# Patient Record
Sex: FEMALE | Race: BLACK OR AFRICAN AMERICAN | Employment: OTHER | URBAN - METROPOLITAN AREA
[De-identification: names, ages, dates, MRNs, and addresses within clinical notes are randomized per-mention and may not be internally consistent; named-entity substitution may affect disease eponyms.]

---

## 2017-03-15 DIAGNOSIS — E78.00 PURE HYPERCHOLESTEROLEMIA: ICD-10-CM

## 2017-03-15 RX ORDER — PRAVASTATIN SODIUM 10 MG/1
10 TABLET ORAL
Qty: 90 TAB | Refills: 1 | Status: SHIPPED | OUTPATIENT
Start: 2017-03-15 | End: 2018-01-03 | Stop reason: SDUPTHER

## 2017-03-15 NOTE — TELEPHONE ENCOUNTER
Called and left message for Yamel Long (daughter) to give us a call and schedule her mother for a AWV and for HTN since we have not seen her this year.

## 2017-04-28 ENCOUNTER — OFFICE VISIT (OUTPATIENT)
Dept: FAMILY MEDICINE CLINIC | Age: 82
End: 2017-04-28

## 2017-04-28 VITALS
OXYGEN SATURATION: 98 % | WEIGHT: 132 LBS | RESPIRATION RATE: 16 BRPM | SYSTOLIC BLOOD PRESSURE: 148 MMHG | BODY MASS INDEX: 25.91 KG/M2 | DIASTOLIC BLOOD PRESSURE: 84 MMHG | HEART RATE: 81 BPM | HEIGHT: 60 IN | TEMPERATURE: 99 F

## 2017-04-28 DIAGNOSIS — Z00.00 ROUTINE GENERAL MEDICAL EXAMINATION AT A HEALTH CARE FACILITY: Primary | ICD-10-CM

## 2017-04-28 DIAGNOSIS — Z71.89 ADVANCE DIRECTIVE DISCUSSED WITH PATIENT: ICD-10-CM

## 2017-04-28 DIAGNOSIS — I10 ESSENTIAL HYPERTENSION, BENIGN: ICD-10-CM

## 2017-04-28 DIAGNOSIS — M81.0 AGE RELATED OSTEOPOROSIS, UNSPECIFIED PATHOLOGICAL FRACTURE PRESENCE: ICD-10-CM

## 2017-04-28 DIAGNOSIS — K21.9 GASTROESOPHAGEAL REFLUX DISEASE WITHOUT ESOPHAGITIS: ICD-10-CM

## 2017-04-28 RX ORDER — PANTOPRAZOLE SODIUM 40 MG/1
40 TABLET, DELAYED RELEASE ORAL DAILY
Qty: 90 TAB | Refills: 1 | Status: SHIPPED | OUTPATIENT
Start: 2017-04-28 | End: 2017-11-17 | Stop reason: SDUPTHER

## 2017-04-28 NOTE — PROGRESS NOTES
Patient is currently not taking the following medications and wants them removed from their list:    no    Learning Assessment (baseline): complete  Depression Screening: complete  Fall Risk:  complete    1. Have you been to the ER, urgent care clinic since your last visit? Hospitalized since your last visit? No    2. Have you seen or consulted any other health care providers outside of the [address] [place] [address] since your last visit? Include any pap smears or colon screening.  No      Patient is due for the following immunizations:    Influenza: declined  Pneumococcal:declined

## 2017-04-28 NOTE — PROGRESS NOTES
Subjective:   Jaida Garcia is a 80 y.o. female with hypertension, GERD, Osteoporosis    Doing great with current Tx and needs refill protonix 40mg daily which works great. Tolerating fosamax but will forget dose here and there. Hypertension ROS: taking medications as instructed, no medication side effects noted, no TIA's, no chest pain on exertion, no dyspnea on exertion, no swelling of ankles. Other symptoms and concerns: none. Past medications tried and failed none. Current Outpatient Prescriptions   Medication Sig Dispense Refill    pravastatin (PRAVACHOL) 10 mg tablet Take 1 Tab by mouth nightly. 90 Tab 1    losartan (COZAAR) 50 mg tablet 50 mg two (2) times a day.  donepezil (ARICEPT) 5 mg tablet Take 1 Tab by mouth nightly. 90 Tab 1    metoprolol tartrate (LOPRESSOR) 50 mg tablet Take 50 mg by mouth daily.  alendronate (FOSAMAX) 35 mg tablet Take 1 Tab by mouth every seven (7) days. Take on empty stomach with 12 oz water. 12 Tab 1    pantoprazole (PROTONIX) 40 mg tablet Take 1 tablet twice daily for a month, then 1 tablet once daily. 90 Tab 1    ferrous sulfate 325 mg (65 mg iron) tablet Take 1 Tab by mouth three (3) times daily (with meals). 90 Tab 1    senna-docusate (PERICOLACE) 8.6-50 mg per tablet Take 1 tablet by mouth daily. 30 tablet 0    amLODIPine (NORVASC) 5 mg tablet Take 1 Tab by mouth daily. 90 Tab 1    cyanocobalamin 1,000 mcg tablet Take 1 Tab by mouth daily. 30 Tab 1    polyethylene glycol (MIRALAX) 17 gram/dose powder Take 17 g by mouth daily.  527 g 1      Patient Active Problem List   Diagnosis Code    HTN (hypertension) I10    GERD (gastroesophageal reflux disease) K21.9    Anxiety F41.9    Pulmonary embolism (HCC) I26.99    DVT (deep venous thrombosis) (HCC) I82.409    Anemia D64.9    HLD (hyperlipidemia) E78.5    Atrial fibrillation (HCC) I48.91    PSVT (paroxysmal supraventricular tachycardia) (HCC) I47.1    Syncope R55    Cataract H26.9    Right knee pain M25.561    CAD (coronary artery disease) I25.10    Hypertrophic cardiomyopathy (HCC) I42.2    Cardiomegaly I51.7    Palpitations R00.2    Atrial flutter (HCC) I48.92    Coronary atherosclerosis of native coronary artery I25.10    Essential hypertension, benign I10    Unspecified combined systolic and diastolic heart failure N99.01    Pre-operative cardiovascular examination Z01.810    Nonspecific abnormal electrocardiogram (ECG) (EKG) R94.31    Intermediate coronary syndrome (HCC) I20.0    Depression F32.9    GI bleed K92.2    Diverticulitis K57.92    S/P ablation operation for arrhythmia Z98.890, Z86.79    CVA (cerebral infarction) I63.9    Acute right-sided weakness M62.89    Thrombocytopenia (HCC) D69.6    Osteoporosis M81.0    Acute GI bleeding K92.2    Acute blood loss anemia D62    Diverticulosis K57.90    History of blood transfusion Z92.89    Lower GI bleed K92.2     Family History   Problem Relation Age of Onset    Coronary Artery Disease Neg Hx      Lab Results   Component Value Date/Time    Cholesterol, total 155 04/17/2015 01:13 AM    HDL Cholesterol 56 04/17/2015 01:13 AM    LDL, calculated 76.4 04/17/2015 01:13 AM    VLDL, calculated 22.6 04/17/2015 01:13 AM    Triglyceride 113 04/17/2015 01:13 AM    CHOL/HDL Ratio 2.8 04/17/2015 01:13 AM     Lab Results   Component Value Date/Time    Sodium 142 11/06/2016 07:00 AM    Potassium 3.2 11/06/2016 07:00 AM    Chloride 106 11/06/2016 07:00 AM    CO2 29 11/06/2016 07:00 AM    Anion gap 7 11/06/2016 07:00 AM    Glucose 77 11/06/2016 07:00 AM    BUN 12 11/06/2016 07:00 AM    Creatinine 1.00 11/06/2016 07:00 AM    BUN/Creatinine ratio 12 11/06/2016 07:00 AM    GFR est AA >60 11/06/2016 07:00 AM    GFR est non-AA 52 11/06/2016 07:00 AM    Calcium 8.7 11/06/2016 07:00 AM    Bilirubin, total 0.3 11/02/2016 09:49 AM    AST (SGOT) 19 11/02/2016 09:49 AM    Alk.  phosphatase 44 11/02/2016 09:49 AM    Protein, total 7.5 11/02/2016 09:49 AM    Albumin 3.5 11/02/2016 09:49 AM    Globulin 4.0 11/02/2016 09:49 AM    A-G Ratio 0.9 11/02/2016 09:49 AM    ALT (SGPT) 15 11/02/2016 09:49 AM     Lab Results   Component Value Date/Time    WBC 5.1 11/06/2016 07:00 AM    Hemoglobin (POC) 10.5 11/19/2014 11:55 AM    Hemoglobin (POC) 11.2 08/07/2012 03:53 PM    HGB 10.1 11/06/2016 07:00 AM    Hematocrit (POC) 33 08/07/2012 03:53 PM    HCT 30.9 11/06/2016 07:00 AM    PLATELET 64 99/86/1173 07:00 AM    MCV 94.5 11/06/2016 07:00 AM     Wt Readings from Last 3 Encounters:   04/28/17 132 lb (59.9 kg)   11/21/16 130 lb (59 kg)   11/04/16 129 lb 9.6 oz (58.8 kg)       Objective:   Visit Vitals    /84    Pulse 81    Temp 99 °F (37.2 °C) (Oral)    Resp 16    Ht 5' (1.524 m)    Wt 132 lb (59.9 kg)    SpO2 98%    BMI 25.78 kg/m2     GEN:  Appears stated age in NAD. HEENT: Conjunctiva/lids normal.  External ears and nose without lesions/trauma. Hearing Intact. Tongue midline. NECK: Trachea midline. Supple. Full ROM  CARDIAC:  regular rate and rhythm. 3+ Murmur, no peripheral edema. LUNGS: breath sounds equal and symmetric, no accessory muscle use. MS: no clubbing/cyanosis. SKIN: Warm/dry without rash. PSYCH: Appropriate insight, Judgment. A&O x 3. Assessment:    HTN  Osteoporosis  GERD    Plan:   Discussed okay to take fosamax the next day if dose missed w/ H2O on empty stomach. Refill protonix and RCT 6 months. Patient verbalized understanding of plan. See other note MWV.

## 2017-04-28 NOTE — PATIENT INSTRUCTIONS
Advance Directives: Care Instructions  Your Care Instructions  An advance directive is a legal way to state your wishes at the end of your life. It tells your family and your doctor what to do if you can no longer say what you want. There are two main types of advance directives. You can change them any time that your wishes change. · A living will tells your family and your doctor your wishes about life support and other treatment. · A durable power of  for health care lets you name a person to make treatment decisions for you when you can't speak for yourself. This person is called a health care agent. If you do not have an advance directive, decisions about your medical care may be made by a doctor or a  who doesn't know you. It may help to think of an advance directive as a gift to the people who care for you. If you have one, they won't have to make tough decisions by themselves. Follow-up care is a key part of your treatment and safety. Be sure to make and go to all appointments, and call your doctor if you are having problems. It's also a good idea to know your test results and keep a list of the medicines you take. How can you care for yourself at home? · Discuss your wishes with your loved ones and your doctor. This way, there are no surprises. · Many states have a unique form. Or you might use a universal form that has been approved by many states. This kind of form can sometimes be completed and stored online. Your electronic copy will then be available wherever you have a connection to the Internet. In most cases, doctors will respect your wishes even if you have a form from a different state. · You don't need a  to do an advance directive. But you may want to get legal advice. · Think about these questions when you prepare an advance directive:  ¨ Who do you want to make decisions about your medical care if you are not able to?  Many people choose a family member or close friend. ¨ Do you know enough about life support methods that might be used? If not, talk to your doctor so you understand. ¨ What are you most afraid of that might happen? You might be afraid of having pain, losing your independence, or being kept alive by machines. ¨ Where would you prefer to die? Choices include your home, a hospital, or a nursing home. ¨ Would you like to have information about hospice care to support you and your family? ¨ Do you want to donate organs when you die? ¨ Do you want certain Alevism practices performed before you die? If so, put your wishes in the advance directive. · Read your advance directive every year, and make changes as needed. When should you call for help? Be sure to contact your doctor if you have any questions. Where can you learn more? Go to http://cachorroMesosphereitzel.info/. Enter R264 in the search box to learn more about \"Advance Directives: Care Instructions. \"  Current as of: November 17, 2016  Content Version: 11.2  © 7057-2116 [employer]. Care instructions adapted under license by [employer] (which disclaims liability or warranty for this information). If you have questions about a medical condition or this instruction, always ask your healthcare professional. [place] [address] any warranty or liability for your use of this information. Well Visit, Over 72: Care Instructions  Your Care Instructions  Physical exams can help you stay healthy. Your doctor has checked your overall health and may have suggested ways to take good care of yourself. He or she also may have recommended tests. At home, you can help prevent illness with healthy eating, regular exercise, and other steps. Follow-up care is a key part of your treatment and safety. Be sure to make and go to all appointments, and call your doctor if you are having problems.  It's also a good idea to know your test results and keep a list of the medicines you take. How can you care for yourself at home? · Reach and stay at a healthy weight. This will lower your risk for many problems, such as obesity, diabetes, heart disease, and high blood pressure. · Get at least 30 minutes of exercise on most days of the week. Walking is a good choice. You also may want to do other activities, such as running, swimming, cycling, or playing tennis or team sports. · Do not smoke. Smoking can make health problems worse. If you need help quitting, talk to your doctor about stop-smoking programs and medicines. These can increase your chances of quitting for good. · Protect your skin from too much sun. When you're outdoors from 10 a.m. to 4 p.m., stay in the shade or cover up with clothing and a hat with a wide brim. Wear sunglasses that block UV rays. Even when it's cloudy, put broad-spectrum sunscreen (SPF 30 or higher) on any exposed skin. · See a dentist one or two times a year for checkups and to have your teeth cleaned. · Wear a seat belt in the car. · Limit alcohol to 2 drinks a day for men and 1 drink a day for women. Too much alcohol can cause health problems. Follow your doctor's advice about when to have certain tests. These tests can spot problems early. For men and women  · Cholesterol. Your doctor will tell you how often to have this done based on your overall health and other things that can increase your risk for heart attack and stroke. · Blood pressure. Have your blood pressure checked during a routine doctor visit. Your doctor will tell you how often to check your blood pressure based on your age, your blood pressure results, and other factors. · Diabetes. Ask your doctor whether you should have tests for diabetes. · Vision. Experts recommend that you have yearly exams for glaucoma and other age-related eye problems. · Hearing. Tell your doctor if you notice any change in your hearing.  You can have tests to find out how well you hear.  · Colon cancer tests. Keep having colon cancer tests as your doctor recommends. You can have one of several types of tests. · Heart attack and stroke risk. At least every 4 to 6 years, you should have your risk for heart attack and stroke assessed. Your doctor uses factors such as your age, blood pressure, cholesterol, and whether you smoke or have diabetes to show what your risk for a heart attack or stroke is over the next 10 years. · Osteoporosis. Talk to your doctor about whether you should have a bone density test to find out whether you have thinning bones. Also ask your doctor about whether you should take calcium and vitamin D supplements. For women  · Pap test and pelvic exam. You may no longer need a Pap test. Talk with your doctor about whether to stop or continue to have Pap tests. · Breast exam and mammogram. Ask how often you should have a mammogram, which is an X-ray of your breasts. A mammogram can spot breast cancer before it can be felt and when it is easiest to treat. · Thyroid disease. Talk to your doctor about whether to have your thyroid checked as part of a regular physical exam. Women have an increased chance of a thyroid problem. For men  · Prostate exam. Talk to your doctor about whether you should have a blood test (called a PSA test) for prostate cancer. Experts disagree on whether men should have this test. Some experts recommend that you discuss the benefits and risks of the test with your doctor. · Abdominal aortic aneurysm. Ask your doctor whether you should have a test to check for an aneurysm. You may need a test if you ever smoked or if your parent, brother, sister, or child has had an aneurysm. When should you call for help? Watch closely for changes in your health, and be sure to contact your doctor if you have any problems or symptoms that concern you. Where can you learn more? Go to http://cachorro-itzel.info/.   Enter L278 in the search box to learn more about \"Well Visit, Over 65: Care Instructions. \"  Current as of: July 19, 2016  Content Version: 11.2  © 3532-0162 [employer]. Care instructions adapted under license by [employer] (which disclaims liability or warranty for this information). If you have questions about a medical condition or this instruction, always ask your healthcare professional. [place] [address] any warranty or liability for your use of this information.

## 2017-04-28 NOTE — PROGRESS NOTES
This is an Initial Medicare Annual Wellness Exam (AWV) (Performed 12 months after IPPE or effective date of Medicare Part B enrollment, Once in a lifetime)    I have reviewed the patient's medical history in detail and updated the computerized patient record. History     Past Medical History:   Diagnosis Date    Anemia 1/11/2010    thrombocytopenia    Anxiety 1/11/2010    Atrial fibrillation ([address] 1/11/2010    Atrial flutter (HCC)     recurrent    CAD (coronary artery disease) 8/3/2010    Cataract 1/11/2010    Coronary atherosclerosis of native coronary artery     CVA (cerebral infarction)     Depression 3/18/2014    Diverticulitis 11/19/2014    DVT (deep venous thrombosis) ([address] 1/1/2007    Dysarthria     Essential hypertension, benign     GERD (gastroesophageal reflux disease) 1/11/2010    History of blood transfusion 2/16/2016    HLD (hyperlipidemia) 1/11/2010    HTN (hypertension) 1/11/2010    Intermediate coronary syndrome (HCC)     Nonspecific abnormal electrocardiogram (ECG) (EKG)     Osteoporosis 1/4/2016    Pre-operative cardiovascular examination     PSVT (paroxysmal supraventricular tachycardia) ([address] 1/11/2010    Pulmonary embolism ([address] 1/1/2007    Right knee pain 1/11/2010    Stroke ([address]     Syncope 5/13/2009    Thrombocytosis ([address]     Unspecified combined systolic and diastolic heart failure       Past Surgical History:   Procedure Laterality Date    CARDIAC SURG PROCEDURE UNLIST      ablation    HX CATARACT REMOVAL      bilateral    HX CHOLECYSTECTOMY      HX OTHER SURGICAL      IVC filter    HX PACEMAKER      IR IVC FILTER PLACEMENT PERCUTANEOUS  2007     Current Outpatient Prescriptions   Medication Sig Dispense Refill    pravastatin (PRAVACHOL) 10 mg tablet Take 1 Tab by mouth nightly. 90 Tab 1    losartan (COZAAR) 50 mg tablet 50 mg two (2) times a day.  donepezil (ARICEPT) 5 mg tablet Take 1 Tab by mouth nightly.  90 Tab 1    metoprolol tartrate (LOPRESSOR) 50 mg tablet Take 50 mg by mouth daily.  alendronate (FOSAMAX) 35 mg tablet Take 1 Tab by mouth every seven (7) days. Take on empty stomach with 12 oz water. 12 Tab 1    pantoprazole (PROTONIX) 40 mg tablet Take 1 tablet twice daily for a month, then 1 tablet once daily. 90 Tab 1    ferrous sulfate 325 mg (65 mg iron) tablet Take 1 Tab by mouth three (3) times daily (with meals). 90 Tab 1    senna-docusate (PERICOLACE) 8.6-50 mg per tablet Take 1 tablet by mouth daily. 30 tablet 0    amLODIPine (NORVASC) 5 mg tablet Take 1 Tab by mouth daily. 90 Tab 1    cyanocobalamin 1,000 mcg tablet Take 1 Tab by mouth daily. 30 Tab 1    polyethylene glycol (MIRALAX) 17 gram/dose powder Take 17 g by mouth daily.  527 g 1     Allergies   Allergen Reactions    Pcn [Penicillins] Hives     Family History   Problem Relation Age of Onset    Coronary Artery Disease Neg Hx      Social History   Substance Use Topics    Smoking status: Never Smoker    Smokeless tobacco: Never Used    Alcohol use No     Patient Active Problem List   Diagnosis Code    HTN (hypertension) I10    GERD (gastroesophageal reflux disease) K21.9    Anxiety F41.9    Pulmonary embolism (HCC) I26.99    DVT (deep venous thrombosis) ([facility]) I82.409    Anemia D64.9    HLD (hyperlipidemia) E78.5    Atrial fibrillation (HCC) I48.91    PSVT (paroxysmal supraventricular tachycardia) ([facility]) I47.1    Syncope R55    Cataract H26.9    Right knee pain M25.561    CAD (coronary artery disease) I25.10    Hypertrophic cardiomyopathy (HCC) I42.2    Cardiomegaly I51.7    Palpitations R00.2    Atrial flutter ([facility]) I48.92    Coronary atherosclerosis of native coronary artery I25.10    Essential hypertension, benign I10    Unspecified combined systolic and diastolic heart failure X04.73    Pre-operative cardiovascular examination Z01.810    Nonspecific abnormal electrocardiogram (ECG) (EKG) R94.31    Intermediate coronary syndrome (HCC) I20.0  Depression F32.9    GI bleed K92.2    Diverticulitis K57.92    S/P ablation operation for arrhythmia Z98.890, Z86.79    CVA (cerebral infarction) I63.9    Acute right-sided weakness M62.89    Thrombocytopenia (HCC) D69.6    Osteoporosis M81.0    Acute GI bleeding K92.2    Acute blood loss anemia D62    Diverticulosis K57.90    History of blood transfusion Z92.89    Lower GI bleed K92.2         Depression Risk Factor Screening:     PHQ 2 / 9, over the last two weeks 3/16/2015   Little interest or pleasure in doing things Nearly every day   Feeling down, depressed or hopeless More than half the days   Total Score PHQ 2 5   Trouble falling or staying asleep, or sleeping too much Several days   Feeling tired or having little energy More than half the days   Poor appetite or overeating More than half the days   Feeling bad about yourself - or that you are a failure or have let yourself or your family down Nearly every day   Trouble concentrating on things such as school, work, reading or watching TV Several days   Moving or speaking so slowly that other people could have noticed; or the opposite being so fidgety that others notice More than half the days   Thoughts of being better off dead, or hurting yourself in some way Not at all   PHQ 9 Score 16   How difficult have these problems made it for you to do your work, take care of your home and get along with others -     Alcohol Risk Factor Screening: On any occasion during the past 3 months, have you had more than 3 drinks containing alcohol? No    Do you average more than 7 drinks per week? No    Functional Ability and Level of Safety:     Hearing Loss   none    Activities of Daily Living   Partial assistance. Requires assistance with: cooking    Fall Risk     Fall Risk Assessment, last 12 mths 4/28/2017   Able to walk? Yes   Fall in past 12 months?  No   Fall with injury? -   Number of falls in past 12 months -   Fall Risk Score -     Abuse Screen Patient is not abused    Review of Systems   A comprehensive review of systems was negative except for that written in the HPI. Physical Examination   Evaluation of Cognitive Function:  Mood/affect:  happy  Appearance: age appropriate  Family member/caregiver input: daughter - doing great per her    Visit Vitals    /84    Pulse 81    Temp 99 °F (37.2 °C) (Oral)    Resp 16    Ht 5' (1.524 m)    Wt 132 lb (59.9 kg)    SpO2 98%    BMI 25.78 kg/m2         Patient Care Team:  Sigifredo Hammer DO as PCP - General (Family Practice)  Wolfgang Liang RN as [address] [place] [address]  Mounika Sykes MD as Physician (Internal Medicine)  Analy Shepherd MD (Cardiology)  Lottie Libman, MD (Neurology)    Advice/Referrals/Counseling   Education and counseling provided:  Are appropriate based on today's review and evaluation  End-of-Life planning (with patient's consent)      Assessment/Plan       ICD-10-CM ICD-9-CM    1. Routine general medical examination at a health care facility Z00.00 V70.0    2. Gastroesophageal reflux disease without esophagitis K21.9 530.81 pantoprazole (PROTONIX) 40 mg tablet   3. Advance directive discussed with patient Z71.89 V65.49 ADVANCE CARE PLANNING FIRST 27 MINS     See other note E/M.

## 2017-04-28 NOTE — ACP (ADVANCE CARE PLANNING)
Advance Care Planning (ACP) Provider Note - Comprehensive     Date of ACP Conversation: 04/28/17  Persons included in Conversation:  patient and daughter  Length of ACP Conversation in minutes:  19 minutes    Authorized Decision Maker (if patient is incapable of making informed decisions): This person is:  Healthcare Agent/Medical Power of  under Advance Directive Daughter          General ACP for ALL Patients with Decision Making Capacity:   Importance of advance care planning, including choosing a healthcare agent to communicate patient's healthcare decisions if patient lost the ability to make decisions, such as after a sudden illness or accident  Understanding of the healthcare agent role was assessed and information provided    Review of Existing Advance Directive:  Reviewed in detail - no changes    For Serious or Chronic Illness:  Understanding of medical condition    Understanding of CPR, goals and expected outcomes, benefits and burdens discussed.     Interventions Provided:  Reviewed existing Advance Directive

## 2017-04-28 NOTE — MR AVS SNAPSHOT
Visit Information Date & Time Provider Department Dept. Phone Encounter #  
 4/28/2017  2:20 PM Bety Martins [phone] Follow-up Instructions Return in about 6 months (around 10/28/2017) for blood pressure. Follow-up and Disposition History Upcoming Health Maintenance Date Due DTaP/Tdap/Td series (1 - Tdap) 11/27/1948 ZOSTER VACCINE AGE 60> 11/27/1987 MEDICARE YEARLY EXAM 7/6/2016 GLAUCOMA SCREENING Q2Y 6/9/2017 Pneumococcal 65+ Low/Medium Risk (1 of 2 - PCV13) 11/28/2017* *Topic was postponed. The date shown is not the original due date. Allergies as of 4/28/2017  Review Complete On: 4/28/2017 By: Kate Sue DO Severity Noted Reaction Type Reactions Pcn [Penicillins]  01/11/2010   Topical Hives Current Immunizations  Reviewed on 2/28/2016 Name Date Influenza Vaccine (Quad) PF 11/3/2016  4:29 PM, 2/26/2016  1:59 AM  
  
 Not reviewed this visit You Were Diagnosed With   
  
 Codes Comments Routine general medical examination at a health care facility    -  Primary ICD-10-CM: Z00.00 ICD-9-CM: V70.0 Gastroesophageal reflux disease without esophagitis     ICD-10-CM: K21.9 ICD-9-CM: 530.81 Advance directive discussed with patient     ICD-10-CM: Z71.89 ICD-9-CM: V65.49 Age related osteoporosis, unspecified pathological fracture presence     ICD-10-CM: M81.0 ICD-9-CM: 733.01 Essential hypertension, benign     ICD-10-CM: I10 
ICD-9-CM: 401.1 Vitals BP Pulse Temp Resp Height(growth percentile) Weight(growth percentile) 148/84 81 99 °F (37.2 °C) (Oral) 16 5' (1.524 m) 132 lb (59.9 kg) SpO2 BMI OB Status Smoking Status 98% 25.78 kg/m2 Postmenopausal Never Smoker Vitals History BMI and BSA Data Body Mass Index Body Surface Area 25.78 kg/m 2 1.59 m 2 Preferred Pharmacy Pharmacy Name Phone [facility] [address] [place] [address] [address] Your Updated Medication List  
  
   
This list is accurate as of: 4/28/17  2:47 PM.  Always use your most recent med list.  
  
  
  
  
 alendronate 35 mg tablet Commonly known as:  FOSAMAX Take 1 Tab by mouth every seven (7) days. Take on empty stomach with 12 oz water. amLODIPine 5 mg tablet Commonly known as:  Houston Leola Take 1 Tab by mouth daily. cyanocobalamin 1,000 mcg tablet Take 1 Tab by mouth daily. donepezil 5 mg tablet Commonly known as:  ARICEPT Take 1 Tab by mouth nightly. ferrous sulfate 325 mg (65 mg iron) tablet Take 1 Tab by mouth three (3) times daily (with meals). losartan 50 mg tablet Commonly known as:  COZAAR  
50 mg two (2) times a day. metoprolol tartrate 50 mg tablet Commonly known as:  LOPRESSOR Take 50 mg by mouth daily. pantoprazole 40 mg tablet Commonly known as:  PROTONIX Take 1 Tab by mouth daily. polyethylene glycol 17 gram/dose powder Commonly known as:  Jeffery Spotted Take 17 g by mouth daily. pravastatin 10 mg tablet Commonly known as:  PRAVACHOL Take 1 Tab by mouth nightly. senna-docusate 8.6-50 mg per tablet Commonly known as:  Areatha Dach Take 1 tablet by mouth daily. Prescriptions Sent to Pharmacy Refills  
 pantoprazole (PROTONIX) 40 mg tablet 1 Sig: Take 1 Tab by mouth daily. Class: Normal  
 Pharmacy: [facility] [place] [address] #: [phone] Route: Oral  
  
We Performed the Following ADVANCE CARE PLANNING FIRST 30 MINS [54714 CPT(R)] Follow-up Instructions Return in about 6 months (around 10/28/2017) for blood pressure. Patient Instructions Advance Directives: Care Instructions Your Care Instructions An advance directive is a legal way to state your wishes at the end of your life. It tells your family and your doctor what to do if you can no longer say what you want. There are two main types of advance directives. You can change them any time that your wishes change. · A living will tells your family and your doctor your wishes about life support and other treatment. · A durable power of  for health care lets you name a person to make treatment decisions for you when you can't speak for yourself. This person is called a health care agent. If you do not have an advance directive, decisions about your medical care may be made by a doctor or a  who doesn't know you. It may help to think of an advance directive as a gift to the people who care for you. If you have one, they won't have to make tough decisions by themselves. Follow-up care is a key part of your treatment and safety. Be sure to make and go to all appointments, and call your doctor if you are having problems. It's also a good idea to know your test results and keep a list of the medicines you take. How can you care for yourself at home? · Discuss your wishes with your loved ones and your doctor. This way, there are no surprises. · Many states have a unique form. Or you might use a universal form that has been approved by many states. This kind of form can sometimes be completed and stored online. Your electronic copy will then be available wherever you have a connection to the Internet. In most cases, doctors will respect your wishes even if you have a form from a different state. · You don't need a  to do an advance directive. But you may want to get legal advice. · Think about these questions when you prepare an advance directive: ¨ Who do you want to make decisions about your medical care if you are not able to? Many people choose a family member or close friend. ¨ Do you know enough about life support methods that might be used? If not, talk to your doctor so you understand. ¨ What are you most afraid of that might happen? You might be afraid of having pain, losing your independence, or being kept alive by machines. ¨ Where would you prefer to die? Choices include your home, a hospital, or a nursing home. ¨ Would you like to have information about hospice care to support you and your family? ¨ Do you want to donate organs when you die? ¨ Do you want certain Yazdanism practices performed before you die? If so, put your wishes in the advance directive. · Read your advance directive every year, and make changes as needed. When should you call for help? Be sure to contact your doctor if you have any questions. Where can you learn more? Go to http://cachorro-itzel.info/. Enter R264 in the search box to learn more about \"Advance Directives: Care Instructions. \" Current as of: November 17, 2016 Content Version: 11.2 © 7948-2793 latakoo. Care instructions adapted under license by [employer] (which disclaims liability or warranty for this information). If you have questions about a medical condition or this instruction, always ask your healthcare professional. [place] [address] any warranty or liability for your use of this information. Well Visit, Over 72: Care Instructions Your Care Instructions Physical exams can help you stay healthy. Your doctor has checked your overall health and may have suggested ways to take good care of yourself. He or she also may have recommended tests. At home, you can help prevent illness with healthy eating, regular exercise, and other steps. Follow-up care is a key part of your treatment and safety. Be sure to make and go to all appointments, and call your doctor if you are having problems. It's also a good idea to know your test results and keep a list of the medicines you take. How can you care for yourself at home? · Reach and stay at a healthy weight. This will lower your risk for many problems, such as obesity, diabetes, heart disease, and high blood pressure. · Get at least 30 minutes of exercise on most days of the week. Walking is a good choice. You also may want to do other activities, such as running, swimming, cycling, or playing tennis or team sports. · Do not smoke. Smoking can make health problems worse. If you need help quitting, talk to your doctor about stop-smoking programs and medicines. These can increase your chances of quitting for good. · Protect your skin from too much sun. When you're outdoors from 10 a.m. to 4 p.m., stay in the shade or cover up with clothing and a hat with a wide brim. Wear sunglasses that block UV rays. Even when it's cloudy, put broad-spectrum sunscreen (SPF 30 or higher) on any exposed skin. · See a dentist one or two times a year for checkups and to have your teeth cleaned. · Wear a seat belt in the car. · Limit alcohol to 2 drinks a day for men and 1 drink a day for women. Too much alcohol can cause health problems. Follow your doctor's advice about when to have certain tests. These tests can spot problems early. For men and women · Cholesterol. Your doctor will tell you how often to have this done based on your overall health and other things that can increase your risk for heart attack and stroke. · Blood pressure. Have your blood pressure checked during a routine doctor visit. Your doctor will tell you how often to check your blood pressure based on your age, your blood pressure results, and other factors. · Diabetes. Ask your doctor whether you should have tests for diabetes. · Vision. Experts recommend that you have yearly exams for glaucoma and other age-related eye problems. · Hearing. Tell your doctor if you notice any change in your hearing. You can have tests to find out how well you hear. · Colon cancer tests.  Keep having colon cancer tests as your doctor recommends. You can have one of several types of tests. · Heart attack and stroke risk. At least every 4 to 6 years, you should have your risk for heart attack and stroke assessed. Your doctor uses factors such as your age, blood pressure, cholesterol, and whether you smoke or have diabetes to show what your risk for a heart attack or stroke is over the next 10 years. · Osteoporosis. Talk to your doctor about whether you should have a bone density test to find out whether you have thinning bones. Also ask your doctor about whether you should take calcium and vitamin D supplements. For women · Pap test and pelvic exam. You may no longer need a Pap test. Talk with your doctor about whether to stop or continue to have Pap tests. · Breast exam and mammogram. Ask how often you should have a mammogram, which is an X-ray of your breasts. A mammogram can spot breast cancer before it can be felt and when it is easiest to treat. · Thyroid disease. Talk to your doctor about whether to have your thyroid checked as part of a regular physical exam. Women have an increased chance of a thyroid problem. For men · Prostate exam. Talk to your doctor about whether you should have a blood test (called a PSA test) for prostate cancer. Experts disagree on whether men should have this test. Some experts recommend that you discuss the benefits and risks of the test with your doctor. · Abdominal aortic aneurysm. Ask your doctor whether you should have a test to check for an aneurysm. You may need a test if you ever smoked or if your parent, brother, sister, or child has had an aneurysm. When should you call for help? Watch closely for changes in your health, and be sure to contact your doctor if you have any problems or symptoms that concern you. Where can you learn more? Go to http://cachorro-itzel.info/. Enter G371 in the search box to learn more about \"Well Visit, Over 65: Care Instructions. \" 
 Current as of: July 19, 2016 Content Version: 11.2 © 2208-1555 [email or web address].[email or web address], Appirio. Care instructions adapted under license by [employer] (which disclaims liability or warranty for this information). If you have questions about a medical condition or this instruction, always ask your healthcare professional. [address] any warranty or liability for your use of this information. Introducing [facility] & HEALTH SERVICES! [facility] introduces [employer] patient portal. Now you can access parts of your medical record, email your doctor's office, and request medication refills online. 1. In your internet browser, go to [email or web address] 2. Click on the First Time User? Click Here link in the Sign In box. You will see the New Member Sign Up page. 3. Enter your [employer] Access Code exactly as it appears below. You will not need to use this code after youve completed the sign-up process. If you do not sign up before the expiration date, you must request a new code. · [employer] Access Code: [email or web address] Expires: 7/27/2017  2:47 PM 
 
4. Enter the last four digits of your Social Security Number (xxxx) and Date of Birth (mm/dd/yyyy) as indicated and click Submit. You will be taken to the next sign-up page. 5. Create a [employer] ID. This will be your [employer] login ID and cannot be changed, so think of one that is secure and easy to remember. 6. Create a [employer] password. You can change your password at any time. 7. Enter your Password Reset Question and Answer. This can be used at a later time if you forget your password. 8. Enter your e-mail address. You will receive e-mail notification when new information is available in [address] 9. Click Sign Up. You can now view and download portions of your medical record. 10. Click the Download Summary menu link to download a portable copy of your medical information. If you have questions, please visit the Frequently Asked Questions section of the [email or web address] website. Remember, [employer] is NOT to be used for urgent needs. For medical emergencies, dial 911. Now available from your iPhone and Android! Please provide this summary of care documentation to your next provider. Your primary care clinician is listed as Skyla Pierre. If you have any questions after today's visit, please call [phone].

## 2017-06-01 DIAGNOSIS — D50.8 IRON DEFICIENCY ANEMIA DUE TO DIETARY CAUSES: ICD-10-CM

## 2017-06-01 RX ORDER — LANOLIN ALCOHOL/MO/W.PET/CERES
CREAM (GRAM) TOPICAL
Qty: 180 TAB | Refills: 0 | Status: SHIPPED | OUTPATIENT
Start: 2017-06-01 | End: 2017-11-17 | Stop reason: SDUPTHER

## 2017-06-21 ENCOUNTER — TELEPHONE (OUTPATIENT)
Dept: FAMILY MEDICINE CLINIC | Age: 82
End: 2017-06-21

## 2017-06-21 NOTE — TELEPHONE ENCOUNTER
Fanny with [facility] called and said that pt daughter is requesting a creme for pt's hands and feet.    Wanted to make sure it would be ok for pt's daughter to start applying Cortizone creme

## 2017-06-22 NOTE — TELEPHONE ENCOUNTER
Called Fanny back and advised her that it was ok for her to start applying Cortisone creme on patients foot.  (left voicemail)

## 2017-08-29 RX ORDER — METOPROLOL TARTRATE 50 MG/1
50 TABLET ORAL DAILY
Qty: 30 TAB | Refills: 4 | Status: SHIPPED | OUTPATIENT
Start: 2017-08-29 | End: 2018-02-05 | Stop reason: SDUPTHER

## 2017-11-17 ENCOUNTER — OFFICE VISIT (OUTPATIENT)
Dept: FAMILY MEDICINE CLINIC | Age: 82
End: 2017-11-17

## 2017-11-17 VITALS
OXYGEN SATURATION: 97 % | RESPIRATION RATE: 16 BRPM | DIASTOLIC BLOOD PRESSURE: 95 MMHG | BODY MASS INDEX: 27.41 KG/M2 | WEIGHT: 139.6 LBS | HEIGHT: 60 IN | TEMPERATURE: 97.9 F | SYSTOLIC BLOOD PRESSURE: 186 MMHG | HEART RATE: 89 BPM

## 2017-11-17 DIAGNOSIS — D50.8 IRON DEFICIENCY ANEMIA DUE TO DIETARY CAUSES: ICD-10-CM

## 2017-11-17 DIAGNOSIS — K21.9 GASTROESOPHAGEAL REFLUX DISEASE WITHOUT ESOPHAGITIS: ICD-10-CM

## 2017-11-17 DIAGNOSIS — L30.1 ECZEMA, DYSHIDROTIC: Primary | ICD-10-CM

## 2017-11-17 RX ORDER — AMLODIPINE BESYLATE 10 MG/1
TABLET ORAL
COMMUNITY
Start: 2017-11-02 | End: 2017-11-17 | Stop reason: ALTCHOICE

## 2017-11-17 RX ORDER — PANTOPRAZOLE SODIUM 40 MG/1
40 TABLET, DELAYED RELEASE ORAL DAILY
Qty: 90 TAB | Refills: 1 | Status: SHIPPED | OUTPATIENT
Start: 2017-11-17 | End: 2018-04-30 | Stop reason: SDUPTHER

## 2017-11-17 RX ORDER — AMLODIPINE BESYLATE 5 MG/1
5 TABLET ORAL DAILY
COMMUNITY
Start: 2017-08-23 | End: 2020-01-01

## 2017-11-17 RX ORDER — BETAMETHASONE DIPROPIONATE 0.5 MG/G
CREAM TOPICAL
Qty: 60 G | Refills: 5 | Status: SHIPPED | OUTPATIENT
Start: 2017-11-17 | End: 2019-02-21 | Stop reason: ALTCHOICE

## 2017-11-17 RX ORDER — LANOLIN ALCOHOL/MO/W.PET/CERES
325 CREAM (GRAM) TOPICAL
Qty: 180 TAB | Refills: 0 | Status: SHIPPED | OUTPATIENT
Start: 2017-11-17 | End: 2018-04-30 | Stop reason: SDUPTHER

## 2017-11-17 NOTE — PROGRESS NOTES
HISTORY OF PRESENT ILLNESS  Mackenzie Rodrigues is a 80 y.o. female. Patient presents today with dry feet. HPI    ROS  Visit Vitals    BP (!) 186/95 (BP 1 Location: Left arm, BP Patient Position: Sitting)    Pulse 89    Temp 97.9 °F (36.6 °C) (Oral)    Resp 16    Ht 5' (1.524 m)    Wt 139 lb 9.6 oz (63.3 kg)    SpO2 97%    BMI 27.26 kg/m2       Physical Exam   Constitutional: She appears well-developed. No distress. Cardiovascular: Normal rate and normal heart sounds. Pulmonary/Chest: Effort normal and breath sounds normal. No respiratory distress. She has no wheezes. Skin: Skin is warm. Rash noted. Rash is papular. bettina hands and feet are dry but without fissures. There are papules that are dried up. ASSESSMENT and PLAN    ICD-10-CM ICD-9-CM    1. Eczema, dyshidrotic L30.1 705.81 betamethasone dipropionate (DIPROSONE) 0.05 % topical cream   2. Gastroesophageal reflux disease without esophagitis K21.9 530.81 pantoprazole (PROTONIX) 40 mg tablet   3. Iron deficiency anemia due to dietary causes D50.8 280.1 ferrous sulfate 325 mg (65 mg iron) tablet     PLAN:  Skin care was discussed and pt was given instructions; Apply the steroid cream twice a day to hands and feet along with a moisturizer and at night add crisco shortening and cover with booties, do this for two weeks, then gone down to just the evening care. Call with any concerns. Pt was given after visit summary.

## 2017-11-17 NOTE — PATIENT INSTRUCTIONS
Use the Betamethasone (steroid cream) twice a day for two weeks. During the day:  Put steroid cream on first then a moisturizer (cetaphil)     Then    Night  Steroid cream, moisturizer and then crisco shortening. Once the skin has improved, you can continue just doing the skin care at night minus the crisco.    Wear white booties and gloves while using the crisco so it doesn't get on the bedding.

## 2017-11-17 NOTE — MR AVS SNAPSHOT
Visit Information Date & Time Provider Department Dept. Phone Encounter #  
 11/17/2017  1:30 PM Pan Sosa NP Luis Dickens [place] [facility] [id number] Follow-up Instructions Return if symptoms worsen or fail to improve. Upcoming Health Maintenance Date Due DTaP/Tdap/Td series (1 - Tdap) 11/27/1948 ZOSTER VACCINE AGE 60> 9/27/1987 GLAUCOMA SCREENING Q2Y 6/9/2017 Influenza Age 5 to Adult 8/1/2017 Pneumococcal 65+ Low/Medium Risk (1 of 2 - PCV13) 11/28/2017* MEDICARE YEARLY EXAM 4/29/2018 *Topic was postponed. The date shown is not the original due date. Allergies as of 11/17/2017  Review Complete On: 11/17/2017 By: Pan Sosa NP Severity Noted Reaction Type Reactions Pcn [Penicillins]  01/11/2010   Topical Hives Current Immunizations  Reviewed on 2/28/2016 Name Date Influenza Vaccine (Quad) PF 11/3/2016  4:29 PM, 2/26/2016  1:59 AM  
  
 Not reviewed this visit You Were Diagnosed With   
  
 Codes Comments Eczema, dyshidrotic    -  Primary ICD-10-CM: L30.1 ICD-9-CM: 705.81 Gastroesophageal reflux disease without esophagitis     ICD-10-CM: K21.9 ICD-9-CM: 530.81 Iron deficiency anemia due to dietary causes     ICD-10-CM: D50.8 ICD-9-CM: 280.1 Vitals BP Pulse Temp Resp Height(growth percentile) Weight(growth percentile) (!) 186/95 (BP 1 Location: Left arm, BP Patient Position: Sitting) 89 97.9 °F (36.6 °C) (Oral) 16 5' (1.524 m) 139 lb 9.6 oz (63.3 kg) SpO2 BMI OB Status Smoking Status 97% 27.26 kg/m2 Postmenopausal Never Smoker BMI and BSA Data Body Mass Index Body Surface Area  
 27.26 kg/m 2 1.64 m 2 Preferred Pharmacy Pharmacy Name Phone [facility] [address] [place], [address] Your Updated Medication List  
  
   
This list is accurate as of: 11/17/17  2:10 PM.  Always use your most recent med list.  
  
  
  
  
 alendronate 35 mg tablet Commonly known as:  FOSAMAX Take 1 Tab by mouth every seven (7) days. Take on empty stomach with 12 oz water. * amLODIPine 5 mg tablet Commonly known as:  Jaya Chafe Take 1 Tab by mouth daily. * amLODIPine 10 mg tablet Commonly known as:  NORVASC  
  
 betamethasone dipropionate 0.05 % topical cream  
Commonly known as:  Darral Mould Use twice a day. cyanocobalamin 1,000 mcg tablet Take 1 Tab by mouth daily. donepezil 5 mg tablet Commonly known as:  ARICEPT Take 1 Tab by mouth nightly. ferrous sulfate 325 mg (65 mg iron) tablet Take 1 Tab by mouth Daily (before breakfast). losartan 50 mg tablet Commonly known as:  COZAAR  
50 mg two (2) times a day. metoprolol tartrate 50 mg tablet Commonly known as:  LOPRESSOR Take 1 Tab by mouth daily. pantoprazole 40 mg tablet Commonly known as:  PROTONIX Take 1 Tab by mouth daily. polyethylene glycol 17 gram/dose powder Commonly known as:  Waymond Steve Take 17 g by mouth daily. pravastatin 10 mg tablet Commonly known as:  PRAVACHOL Take 1 Tab by mouth nightly. senna-docusate 8.6-50 mg per tablet Commonly known as:  Joelyn Muster Take 1 tablet by mouth daily. * Notice: This list has 2 medication(s) that are the same as other medications prescribed for you. Read the directions carefully, and ask your doctor or other care provider to review them with you. Prescriptions Sent to Pharmacy Refills  
 pantoprazole (PROTONIX) 40 mg tablet 1 Sig: Take 1 Tab by mouth daily. Class: Normal  
 Pharmacy: [address] #: [phone] Route: Oral  
 ferrous sulfate 325 mg (65 mg iron) tablet 0 Sig: Take 1 Tab by mouth Daily (before breakfast).   
 Class: Normal  
 Pharmacy: [address] [place] [address] [address] Ph #: [phone] Route: Oral  
 betamethasone dipropionate (DIPROSONE) 0.05 % topical cream 5 Sig: Use twice a day. Class: Normal  
 Pharmacy: [address] [place] #: [phone] Follow-up Instructions Return if symptoms worsen or fail to improve. Patient Instructions Use the Betamethasone (steroid cream) twice a day for two weeks. During the day: 
Put steroid cream on first then a moisturizer (cetaphil) Then Night Steroid cream, moisturizer and then crisco shortening. Once the skin has improved, you can continue just doing the skin care at night minus the crisco. 
 
Wear white booties and gloves while using the crisco so it doesn't get on the bedding. Introducing [facility] SERVICES! Rosalba Rayo introduces [employer] patient portal. Now you can access parts of your medical record, email your doctor's office, and request medication refills online. 1. In your internet browser, go to [email or web address] 2. Click on the First Time User? Click Here link in the Sign In box. You will see the New Member Sign Up page. 3. Enter your Music[employer] Access Code exactly as it appears below. You will not need to use this code after youve completed the sign-up process. If you do not sign up before the expiration date, you must request a new code. · [employer] Access Code: [email or web address] Expires: 2/15/2018  2:03 PM 
 
4. Enter the last four digits of your Social Security Number (xxxx) and Date of Birth (mm/dd/yyyy) as indicated and click Submit. You will be taken to the next sign-up page. 5. Create a Music Dealerst ID. This will be your Music[employer] login ID and cannot be changed, so think of one that is secure and easy to remember. 6. Create a Music[employer] password. You can change your password at any time. 7. Enter your Password Reset Question and Answer.  This can be used at a later time if you forget your password. 8. Enter your e-mail address. You will receive e-mail notification when new information is available in [address] 9. Click Sign Up. You can now view and download portions of your medical record. 10. Click the Download Summary menu link to download a portable copy of your medical information. If you have questions, please visit the Frequently Asked Questions section of the Radiance website. Remember, [employer] is NOT to be used for urgent needs. For medical emergencies, dial 911. Now available from your iPhone and Android! Please provide this summary of care documentation to your next provider. Your primary care clinician is listed as Montse Meadows. If you have any questions after today's visit, please call [phone].

## 2017-11-17 NOTE — PROGRESS NOTES
1. Have you been to the ER, urgent care clinic since your last visit? Hospitalized since your last visit? No    2. Have you seen or consulted any other health care providers outside of the [address] [place] [address] since your last visit? Include any pap smears or colon screening.  No

## 2017-12-06 RX ORDER — LOSARTAN POTASSIUM 50 MG/1
50 TABLET ORAL 2 TIMES DAILY
Qty: 60 TAB | Refills: 3 | Status: SHIPPED | OUTPATIENT
Start: 2017-12-06 | End: 2018-04-12 | Stop reason: SDUPTHER

## 2018-01-03 DIAGNOSIS — E78.00 PURE HYPERCHOLESTEROLEMIA: ICD-10-CM

## 2018-01-03 NOTE — TELEPHONE ENCOUNTER
Pt called in requesting refill of her   Requested Prescriptions     Pending Prescriptions Disp Refills    pravastatin (PRAVACHOL) 10 mg tablet 90 Tab 1     Sig: Take 1 Tab by mouth nightly. Cheryl Saldana

## 2018-01-08 RX ORDER — PRAVASTATIN SODIUM 10 MG/1
10 TABLET ORAL
Qty: 90 TAB | Refills: 1 | Status: SHIPPED | OUTPATIENT
Start: 2018-01-08 | End: 2018-08-11 | Stop reason: SDUPTHER

## 2018-02-05 DIAGNOSIS — I10 ESSENTIAL HYPERTENSION: Primary | ICD-10-CM

## 2018-02-05 RX ORDER — METOPROLOL TARTRATE 50 MG/1
50 TABLET ORAL DAILY
Qty: 90 TAB | Refills: 1 | Status: SHIPPED | OUTPATIENT
Start: 2018-02-05 | End: 2018-09-17 | Stop reason: SDUPTHER

## 2018-04-12 RX ORDER — LOSARTAN POTASSIUM 50 MG/1
50 TABLET ORAL 2 TIMES DAILY
Qty: 180 TAB | Refills: 1 | Status: SHIPPED | OUTPATIENT
Start: 2018-04-12 | End: 2019-01-01 | Stop reason: SDUPTHER

## 2018-04-13 ENCOUNTER — TELEPHONE (OUTPATIENT)
Dept: FAMILY MEDICINE CLINIC | Age: 83
End: 2018-04-13

## 2018-04-13 NOTE — TELEPHONE ENCOUNTER
Left voice message for pt to return call re: We received medical records auth request form that is not complete. I have the form.

## 2018-04-30 ENCOUNTER — OFFICE VISIT (OUTPATIENT)
Dept: FAMILY MEDICINE CLINIC | Age: 83
End: 2018-04-30

## 2018-04-30 ENCOUNTER — HOSPITAL ENCOUNTER (OUTPATIENT)
Dept: LAB | Age: 83
Discharge: HOME | End: 2018-04-30

## 2018-04-30 VITALS
WEIGHT: 147 LBS | BODY MASS INDEX: 28.86 KG/M2 | HEART RATE: 87 BPM | OXYGEN SATURATION: 97 % | SYSTOLIC BLOOD PRESSURE: 160 MMHG | DIASTOLIC BLOOD PRESSURE: 80 MMHG | TEMPERATURE: 97.7 F | HEIGHT: 60 IN | RESPIRATION RATE: 18 BRPM

## 2018-04-30 DIAGNOSIS — R60.0 BILATERAL LOWER EXTREMITY EDEMA: ICD-10-CM

## 2018-04-30 DIAGNOSIS — I10 ESSENTIAL HYPERTENSION: ICD-10-CM

## 2018-04-30 DIAGNOSIS — D50.8 IRON DEFICIENCY ANEMIA DUE TO DIETARY CAUSES: ICD-10-CM

## 2018-04-30 DIAGNOSIS — R06.02 SHORTNESS OF BREATH: ICD-10-CM

## 2018-04-30 DIAGNOSIS — Z00.00 MEDICARE ANNUAL WELLNESS VISIT, SUBSEQUENT: Primary | ICD-10-CM

## 2018-04-30 DIAGNOSIS — D69.6 THROMBOCYTOPENIA (HCC): ICD-10-CM

## 2018-04-30 DIAGNOSIS — K21.9 GASTROESOPHAGEAL REFLUX DISEASE WITHOUT ESOPHAGITIS: ICD-10-CM

## 2018-04-30 DIAGNOSIS — E78.2 MIXED HYPERLIPIDEMIA: ICD-10-CM

## 2018-04-30 LAB
ALBUMIN SERPL-MCNC: 4.2 G/DL (ref 3.2–4.6)
ALBUMIN/GLOB SERPL: 1.1 {RATIO} (ref 1.2–2.2)
ALP SERPL-CCNC: 53 IU/L (ref 39–117)
ALT SERPL-CCNC: 8 IU/L (ref 0–32)
AST SERPL-CCNC: 20 IU/L (ref 0–40)
BILIRUB SERPL-MCNC: 0.3 MG/DL (ref 0–1.2)
BUN SERPL-MCNC: 10 MG/DL (ref 10–36)
BUN/CREAT SERPL: 11 (ref 12–28)
CALCIUM SERPL-MCNC: 9.4 MG/DL (ref 8.7–10.3)
CHLORIDE SERPL-SCNC: 106 MMOL/L (ref 96–106)
CO2 SERPL-SCNC: 29 MMOL/L (ref 18–29)
CREAT SERPL-MCNC: 0.94 MG/DL (ref 0.57–1)
ERYTHROCYTE [DISTWIDTH] IN BLOOD BY AUTOMATED COUNT: 14 % (ref 12.3–15.4)
GFR SERPLBLD CREATININE-BSD FMLA CKD-EPI: 54 ML/MIN/1.73
GFR SERPLBLD CREATININE-BSD FMLA CKD-EPI: 62 ML/MIN/1.73
GLOBULIN SER CALC-MCNC: 3.8 G/DL (ref 1.5–4.5)
GLUCOSE SERPL-MCNC: 90 MG/DL (ref 65–99)
HCT VFR BLD AUTO: 33 % (ref 34–46.6)
HGB BLD-MCNC: 11 G/DL (ref 11.1–15.9)
MCH RBC QN AUTO: 30.2 PG (ref 26.6–33)
MCHC RBC AUTO-ENTMCNC: 33.3 G/DL (ref 31.5–35.7)
MCV RBC AUTO: 91 FL (ref 79–97)
PLATELET # BLD AUTO: 51 X10E3/UL (ref 150–379)
POTASSIUM SERPL-SCNC: 3.6 MMOL/L (ref 3.5–5.2)
PROT SERPL-MCNC: 8 G/DL (ref 6–8.5)
RBC # BLD AUTO: 3.64 X10E6/UL (ref 3.77–5.28)
SODIUM SERPL-SCNC: 143 MMOL/L (ref 134–144)
WBC # BLD AUTO: 4.8 X10E3/UL (ref 3.4–10.8)

## 2018-04-30 PROCEDURE — 99001 SPECIMEN HANDLING PT-LAB: CPT | Performed by: NURSE PRACTITIONER

## 2018-04-30 RX ORDER — LANOLIN ALCOHOL/MO/W.PET/CERES
325 CREAM (GRAM) TOPICAL
Qty: 180 TAB | Refills: 0 | Status: SHIPPED | OUTPATIENT
Start: 2018-04-30 | End: 2019-02-09 | Stop reason: SDUPTHER

## 2018-04-30 RX ORDER — PANTOPRAZOLE SODIUM 40 MG/1
40 TABLET, DELAYED RELEASE ORAL DAILY
Qty: 90 TAB | Refills: 1 | Status: SHIPPED | OUTPATIENT
Start: 2018-04-30 | End: 2019-01-01 | Stop reason: SDUPTHER

## 2018-04-30 RX ORDER — FUROSEMIDE 20 MG/1
20 TABLET ORAL DAILY
Qty: 30 TAB | Refills: 1 | Status: SHIPPED | OUTPATIENT
Start: 2018-04-30 | End: 2018-07-19 | Stop reason: SDUPTHER

## 2018-04-30 RX ORDER — CLOTRIMAZOLE AND BETAMETHASONE DIPROPIONATE 10; .64 MG/G; MG/G
CREAM TOPICAL 2 TIMES DAILY
COMMUNITY
End: 2019-02-21 | Stop reason: ALTCHOICE

## 2018-04-30 NOTE — PROGRESS NOTES
This is the Subsequent Medicare Annual Wellness Exam, performed 12 months or more after the Initial AWV or the last Subsequent AWV    I have reviewed the patient's medical history in detail and updated the computerized patient record. History     Past Medical History:   Diagnosis Date    Anemia 1/11/2010    thrombocytopenia    Anxiety 1/11/2010    Atrial fibrillation ([address] 1/11/2010    Atrial flutter (HCC)     recurrent    CAD (coronary artery disease) 8/3/2010    Cataract 1/11/2010    Coronary atherosclerosis of native coronary artery     CVA (cerebral infarction)     Depression 3/18/2014    Diverticulitis 11/19/2014    DVT (deep venous thrombosis) ([address] 1/1/2007    Dysarthria     Essential hypertension, benign     GERD (gastroesophageal reflux disease) 1/11/2010    History of blood transfusion 2/16/2016    HLD (hyperlipidemia) 1/11/2010    HTN (hypertension) 1/11/2010    Intermediate coronary syndrome (HCC)     Nonspecific abnormal electrocardiogram (ECG) (EKG)     Osteoporosis 1/4/2016    Pre-operative cardiovascular examination     PSVT (paroxysmal supraventricular tachycardia) ([address] 1/11/2010    Pulmonary embolism ([address] 1/1/2007    Right knee pain 1/11/2010    Stroke ([address]     Syncope 5/13/2009    Thrombocytosis ([address]     Unspecified combined systolic and diastolic heart failure       Past Surgical History:   Procedure Laterality Date    CARDIAC SURG PROCEDURE UNLIST      ablation    HX CATARACT REMOVAL      bilateral    HX CHOLECYSTECTOMY      HX OTHER SURGICAL      IVC filter    HX PACEMAKER      IR IVC FILTER PLACEMENT PERCUTANEOUS  2007     Current Outpatient Prescriptions   Medication Sig Dispense Refill    losartan (COZAAR) 50 mg tablet Take 1 Tab by mouth two (2) times a day. 180 Tab 1    metoprolol tartrate (LOPRESSOR) 50 mg tablet Take 1 Tab by mouth daily. 90 Tab 1    pravastatin (PRAVACHOL) 10 mg tablet Take 1 Tab by mouth nightly.  90 Tab 1    pantoprazole (PROTONIX) 40 mg tablet Take 1 Tab by mouth daily. 90 Tab 1    ferrous sulfate 325 mg (65 mg iron) tablet Take 1 Tab by mouth Daily (before breakfast). 180 Tab 0    betamethasone dipropionate (DIPROSONE) 0.05 % topical cream Use twice a day. 60 g 5    amLODIPine (NORVASC) 5 mg tablet Take 5 mg by mouth daily.  cyanocobalamin 1,000 mcg tablet Take 1 Tab by mouth daily. 30 Tab 1    polyethylene glycol (MIRALAX) 17 gram/dose powder Take 17 g by mouth daily. 527 g 1    senna-docusate (PERICOLACE) 8.6-50 mg per tablet Take 1 tablet by mouth daily. 30 tablet 0    donepezil (ARICEPT) 5 mg tablet Take 1 Tab by mouth nightly. 90 Tab 1    alendronate (FOSAMAX) 35 mg tablet Take 1 Tab by mouth every seven (7) days. Take on empty stomach with 12 oz water.  12 Tab 1     Allergies   Allergen Reactions    Pcn [Penicillins] Hives     Family History   Problem Relation Age of Onset    Coronary Artery Disease Neg Hx      Social History   Substance Use Topics    Smoking status: Never Smoker    Smokeless tobacco: Never Used    Alcohol use No     Patient Active Problem List   Diagnosis Code    HTN (hypertension) I10    GERD (gastroesophageal reflux disease) K21.9    Anxiety F41.9    Pulmonary embolism (HCC) I26.99    DVT (deep venous thrombosis) ([facility]) I82.409    Anemia D64.9    HLD (hyperlipidemia) E78.5    Atrial fibrillation (HCC) I48.91    PSVT (paroxysmal supraventricular tachycardia) ([facility]) I47.1    Syncope R55    Cataract H26.9    Right knee pain M25.561    CAD (coronary artery disease) I25.10    Hypertrophic cardiomyopathy (HCC) I42.2    Cardiomegaly I51.7    Palpitations R00.2    Atrial flutter ([facility]) I48.92    Coronary atherosclerosis of native coronary artery I25.10    Essential hypertension, benign I10    Unspecified combined systolic and diastolic heart failure W79.59    Pre-operative cardiovascular examination Z01.810    Nonspecific abnormal electrocardiogram (ECG) (EKG) R94.31    Intermediate coronary syndrome (HCC) I20.0    Depression F32.9    GI bleed K92.2    Diverticulitis K57.92    S/P ablation operation for arrhythmia Z98.890, Z86.79    CVA (cerebral infarction) I63.9    Acute right-sided weakness M62.89    Thrombocytopenia (HCC) D69.6    Osteoporosis M81.0    Acute GI bleeding K92.2    Acute blood loss anemia D62    Diverticulosis K57.90    History of blood transfusion Z92.89    Lower GI bleed K92.2    Advance directive discussed with patient Z71.89       Depression Risk Factor Screening:     PHQ over the last two weeks 3/16/2015   Little interest or pleasure in doing things Nearly every day   Feeling down, depressed or hopeless More than half the days   Total Score PHQ 2 5   Trouble falling or staying asleep, or sleeping too much Several days   Feeling tired or having little energy More than half the days   Poor appetite or overeating More than half the days   Feeling bad about yourself - or that you are a failure or have let yourself or your family down Nearly every day   Trouble concentrating on things such as school, work, reading or watching TV Several days   Moving or speaking so slowly that other people could have noticed; or the opposite being so fidgety that others notice More than half the days   Thoughts of being better off dead, or hurting yourself in some way Not at all   PHQ 9 Score 16   How difficult have these problems made it for you to do your work, take care of your home and get along with others -     Alcohol Risk Factor Screening: You do not drink alcohol or very rarely. Functional Ability and Level of Safety:   Hearing Loss  Hearing is good. Activities of Daily Living  The home contains: no safety equipment.   Patient needs help with:  transportation, shopping, preparing meals, laundry, housework, managing medications, managing money, eating, dressing, bathing, hygiene, bathroom needs and walking    Fall Risk  Fall Risk Assessment, last 15 mths 11/17/2017   Able to walk? Yes   Fall in past 12 months? No   Fall with injury? -   Number of falls in past 12 months -   Fall Risk Score -       Abuse Screen  Patient is not abused    Cognitive Screening   Evaluation of Cognitive Function:  Has your family/caregiver stated any concerns about your memory: yes  Abnormal    Patient Care Team   Patient Care Team:  Yobani Lui NP as PCP - General (Nurse Practitioner)  Jeovany Sommer RN as Ambulatory Care Navigator  Charlotte Rangel MD as Physician (Internal Medicine)  Richard Cm MD (Cardiology)  Luis Espinoza MD (Neurology)      Review of Systems   Constitutional: Negative. Cardiovascular: Positive for chest pain (tightness.) and leg swelling. Gastrointestinal: Positive for abdominal pain (bloating.). Physical Exam   Constitutional: She is well-developed, well-nourished, and in no distress. No distress. Eyes: Conjunctivae and EOM are normal. Pupils are equal, round, and reactive to light. Neck: Normal range of motion. Cardiovascular: Normal rate. Exam reveals friction rub. Pulmonary/Chest: Effort normal. No respiratory distress. She has no wheezes. She has rales. Abdominal: She exhibits distension. She exhibits no mass. There is no tenderness. There is no rebound and no guarding. Musculoskeletal: She exhibits edema. Assessment/Plan   Education and counseling provided:  Are appropriate based on today's review and evaluation    Diagnoses and all orders for this visit:    1. Medicare annual wellness visit, subsequent    2. Iron deficiency anemia due to dietary causes  -     ferrous sulfate 325 mg (65 mg iron) tablet; Take 1 Tab by mouth Daily (before breakfast).         Health Maintenance Due   Topic Date Due    DTaP/Tdap/Td series (1 - Tdap) [birth year]27/1948    ZOSTER VACCINE AGE 60>  09/27/1987    Pneumococcal 65+ Low/Medium Risk (1 of 2 - PCV13) 11/27/1992    GLAUCOMA SCREENING Q2Y  06/09/2017       PLAN:  I spoke with Patient's daughter and Pt has a DNR in place. We discussed her recent onset of symptoms and evaluation for this and medication to help treat the symptoms. Diagnoses and all orders for this visit:    Medicare annual wellness visit, subsequent    Iron deficiency anemia due to dietary causes  -     ferrous sulfate 325 mg (65 mg iron) tablet; Take 1 Tab by mouth Daily (before breakfast). , Normal, Disp-180 Tab, R-0    Gastroesophageal reflux disease without esophagitis  -     pantoprazole (PROTONIX) 40 mg tablet; Take 1 Tab by mouth daily. , Normal, Disp-90 Tab, R-1    Essential hypertension  -     METABOLIC PANEL, COMPREHENSIVE; Future  -     METABOLIC PANEL, COMPREHENSIVE    Thrombocytopenia (HCC)  -     CBC W/O DIFF; Future  -     CBC W/O DIFF    Bilateral lower extremity edema  -     METABOLIC PANEL, COMPREHENSIVE; Future  -     NT-PRO BNP; Future  -     furosemide (LASIX) 20 mg tablet; Take 1 Tab by mouth daily. , Normal, Disp-30 Tab, R-1  -     METABOLIC PANEL, COMPREHENSIVE  -     NT-PRO BNP    Mixed hyperlipidemia  -     LIPID PANEL; Future  -     LIPID PANEL    Shortness of breath  -     XR CHEST PA LAT; Future      Pt's daughter was given after visit summary.

## 2018-04-30 NOTE — MR AVS SNAPSHOT
[facility] 
 
 
 [address] [address] [address] [facility] [address] 
[phone] Patient: Yo Mcgowan MRN: [id number] Visit Information Date & Time Provider Department Dept. Phone Encounter #  
 2018 10:00 AM Анна Mancia NP [address] [id number] Upcoming Health Maintenance Date Due DTaP/Tdap/Td series (1 - Tdap) 1948 ZOSTER VACCINE AGE 60> 1987 GLAUCOMA SCREENING Q2Y 2017 Pneumococcal 65+ Low/Medium Risk (1 of 2 - PCV13) 2019* Influenza Age 5 to Adult 2018 MEDICARE YEARLY EXAM 2019 *Topic was postponed. The date shown is not the original due date. Allergies as of 2018  Review Complete On: 2018 By: Анна Mancia NP Severity Noted Reaction Type Reactions Pcn [Penicillins]  2010   Topical Hives Current Immunizations  Reviewed on 2016 Name Date Influenza Vaccine (Quad) PF 11/3/2016  4:29 PM, 2016  1:59 AM  
  
 Not reviewed this visit You Were Diagnosed With   
  
 Codes Comments Medicare annual wellness visit, subsequent    -  Primary ICD-10-CM: Z00.00 ICD-9-CM: V70.0 Iron deficiency anemia due to dietary causes     ICD-10-CM: D50.8 ICD-9-CM: 280.1 Gastroesophageal reflux disease without esophagitis     ICD-10-CM: K21.9 ICD-9-CM: 530.81 Essential hypertension     ICD-10-CM: I10 
ICD-9-CM: 401.9 Thrombocytopenia ([facility]     ICD-10-CM: D69.6 ICD-9-CM: 287.5 Bilateral lower extremity edema     ICD-10-CM: R60.0 ICD-9-CM: 782.3 Mixed hyperlipidemia     ICD-10-CM: E78.2 ICD-9-CM: 272.2 Shortness of breath     ICD-10-CM: R06.02 
ICD-9-CM: 786.05 Vitals BP Pulse Temp Resp Height(growth percentile) Weight(growth percentile) 160/80 (BP 1 Location: Left arm, BP Patient Position: Sitting) 87 97.7 °F (36.5 °C) (Oral) 18 5' (1.524 m) 147 lb (66.7 kg) SpO2 BMI OB Status Smoking Status 97% 28.71 kg/m2 Postmenopausal Never Smoker Vitals History BMI and BSA Data Body Mass Index Body Surface Area 28.71 kg/m 2 1.68 m 2 Preferred Pharmacy Pharmacy Name Phone [address] [place] [address], [address] [place] [address] [phone] Your Updated Medication List  
  
   
This list is accurate as of 4/30/18 10:47 AM.  Always use your most recent med list. amLODIPine 5 mg tablet Commonly known as:  Jacquetta Couch Take 5 mg by mouth daily. betamethasone dipropionate 0.05 % topical cream  
Commonly known as:  Pettis Slimmer Use twice a day. cyanocobalamin 1,000 mcg tablet Take 1 Tab by mouth daily. ferrous sulfate 325 mg (65 mg iron) tablet Take 1 Tab by mouth Daily (before breakfast). furosemide 20 mg tablet Commonly known as:  LASIX Take 1 Tab by mouth daily. losartan 50 mg tablet Commonly known as:  COZAAR Take 1 Tab by mouth two (2) times a day. LOTRISONE topical cream  
Generic drug:  clotrimazole-betamethasone Apply  to affected area two (2) times a day. metoprolol tartrate 50 mg tablet Commonly known as:  LOPRESSOR Take 1 Tab by mouth daily. pantoprazole 40 mg tablet Commonly known as:  PROTONIX Take 1 Tab by mouth daily. polyethylene glycol 17 gram/dose powder Commonly known as:  Ardsley Muzzy Take 17 g by mouth daily. pravastatin 10 mg tablet Commonly known as:  PRAVACHOL Take 1 Tab by mouth nightly. senna-docusate 8.6-50 mg per tablet Commonly known as:  Per Pont Take 1 tablet by mouth daily. Prescriptions Sent to Pharmacy Refills  
 ferrous sulfate 325 mg (65 mg iron) tablet 0 Sig: Take 1 Tab by mouth Daily (before breakfast). Class: Normal  
 Pharmacy: [address] [place] [address], [facility]: [phone]  Route: Oral  
 pantoprazole (PROTONIX) 40 mg tablet 1 Sig: Take 1 Tab by mouth daily. Class: Normal  
 Pharmacy: [facility] [address] [place] [address] [address] [facility] Ph #: [phone] Route: Oral  
 furosemide (LASIX) 20 mg tablet 1 Sig: Take 1 Tab by mouth daily. Class: Normal  
 Pharmacy: [facility] [address] [place] [address], [facility] Ph #: [phone] Route: Oral  
  
To-Do List   
 04/30/2018 Lab:  CBC W/O DIFF   
  
 04/30/2018 Lab:  LIPID PANEL   
  
 04/30/2018 Lab:  METABOLIC PANEL, COMPREHENSIVE   
  
 04/30/2018 Lab:  NT-PRO BNP   
  
 04/30/2018 Imaging:  XR CHEST PA LAT Patient Instructions Medicare Wellness Visit, Female The best way to live healthy is to have a healthy lifestyle by eating a well-balanced diet, exercising regularly, limiting alcohol and stopping smoking. Regular physical exams and screening tests are another way to keep healthy. Preventive exams provided by your health care provider can find health problems before they become diseases or illnesses. Preventive services including immunizations, screening tests, monitoring and exams can help you take care of your own health. All people over age 72 should have a pneumovax  and and a prevnar shot to prevent pneumonia. These are once in a lifetime unless you and your provider decide differently. All people over 65 should have a yearly flu shot and a tetanus vaccine every 10 years. A bone mass density to screen for osteoporosis or thinning of the bones should be done every 2 years after 65. Screening for diabetes mellitus with a blood sugar test should be done every year.  
 
Glaucoma is a disease of the eye due to increased ocular pressure that can lead to blindness and it should be done every year by an eye professional. 
 
Cardiovascular screening tests that check for elevated lipids (fatty part of blood) which can lead to heart disease and strokes should be done every 5 years. Colorectal screening that evaluates for blood or polyps in your colon should be done yearly as a stool test or every five years as a flexible sigmoidoscope or every 10 years as a colonoscopy up to age 76. Breast cancer screening with a mammogram is recommended biennially  for women age 54-69. Screening for cervical cancer with a pap smear and pelvic exam is recommended for women after age 72 years every 2 years up to age 79 or when the provider and patient decide to stop. If there is a history of cervical abnormalities or other increased risk for cancer then the test is recommended yearly. Hepatitis C screening is also recommended for anyone born between [birth year] through [address]. A shingles vaccine is also recommended once in a lifetime after age 61. Your Medicare Wellness Exam is recommended annually. Here is a list of your current Health Maintenance items with a due date: 
Health Maintenance Due Topic Date Due  
 DTaP/Tdap/Td  (1 - Tdap) 11[birth year]27/1948  Shingles Vaccine  09/27/1987  Pneumococcal Vaccine (1 of 2 - PCV13) 11/27/1992  Glaucoma Screening   06/09/2017 Introducing [facility] [place] [facility]! [facility] introduces [email or web address] patient portal. Now you can access parts of your medical record, email your doctor's office, and request medication refills online. 1. In your internet browser, go to [email or web address] 2. Click on the First Time User? Click Here link in the Sign In box. You will see the New Member Sign Up page. 3. Enter your [email or web address] Access Code exactly as it appears below. You will not need to use this code after youve completed the sign-up process. If you do not sign up before the expiration date, you must request a new code. · [email or web address] Access Code: [email or web address] Expires: 7/29/2018 10:06 AM 
 
 4. Enter the last four digits of your Social Security Number (xxxx) and Date of Birth (mm/dd/yyyy) as indicated and click Submit. You will be taken to the next sign-up page. 5. Create a i[employer] ID. This will be your [employer] login ID and cannot be changed, so think of one that is secure and easy to remember. 6. Create a [employer] password. You can change your password at any time. 7. Enter your Password Reset Question and Answer. This can be used at a later time if you forget your password. 8. Enter your e-mail address. You will receive e-mail notification when new information is available in [address] 9. Click Sign Up. You can now view and download portions of your medical record. 10. Click the Download Summary menu link to download a portable copy of your medical information. If you have questions, please visit the Frequently Asked Questions section of the [employer] website. Remember, [employer] is NOT to be used for urgent needs. For medical emergencies, dial 911. Now available from your iPhone and Android! Please provide this summary of care documentation to your next provider. Your primary care clinician is listed as Kiara Gabriel. If you have any questions after today's visit, please call [phone].

## 2018-04-30 NOTE — PATIENT INSTRUCTIONS

## 2018-05-01 DIAGNOSIS — D69.6 THROMBOCYTOPENIA (HCC): Primary | ICD-10-CM

## 2018-05-01 LAB
BNP SERPL-MCNC: 86.7 PG/ML (ref 0–100)
CHOLEST SERPL-MCNC: 192 MG/DL (ref 100–199)
HDLC SERPL-MCNC: 67 MG/DL
LDLC SERPL CALC-MCNC: 94 MG/DL (ref 0–99)
TRIGL SERPL-MCNC: 157 MG/DL (ref 0–149)
VLDLC SERPL CALC-MCNC: 31 MG/DL (ref 5–40)

## 2018-05-01 NOTE — PROGRESS NOTES
Please advise Pt's daughter that the BNP lab result for CHF was negative. Her cholesterol numbers look great. Her kidney and liver functions are stable. Please let her know that I put in a ref to hematology because her platelets have dropped.

## 2018-05-02 ENCOUNTER — TELEPHONE (OUTPATIENT)
Dept: FAMILY MEDICINE CLINIC | Age: 83
End: 2018-05-02

## 2018-05-02 ENCOUNTER — HOSPITAL ENCOUNTER (OUTPATIENT)
Dept: GENERAL RADIOLOGY | Age: 83
Discharge: HOME | End: 2018-05-02
Payer: MEDICARE

## 2018-05-02 ENCOUNTER — DOCUMENTATION ONLY (OUTPATIENT)
Dept: FAMILY MEDICINE CLINIC | Age: 83
End: 2018-05-02

## 2018-05-02 DIAGNOSIS — R06.02 SHORTNESS OF BREATH: ICD-10-CM

## 2018-05-02 PROCEDURE — 71046 X-RAY EXAM CHEST 2 VIEWS: CPT

## 2018-05-02 NOTE — TELEPHONE ENCOUNTER
----- Message from Martínez Soler NP sent at 5/1/2018  3:46 PM EDT -----  Please advise Pt's daughter that the BNP lab result for CHF was negative. Her cholesterol numbers look great. Her kidney and liver functions are stable. Please let her know that I put in a ref to hematology because her platelets have dropped.

## 2018-05-03 ENCOUNTER — TELEPHONE (OUTPATIENT)
Dept: FAMILY MEDICINE CLINIC | Age: 83
End: 2018-05-03

## 2018-05-03 NOTE — TELEPHONE ENCOUNTER
----- Message from Beronica Dominguez NP sent at 5/2/2018  9:13 PM EDT -----  Please advise Pt's daughter that the labs and the CXR do not show CHF.

## 2018-05-11 ENCOUNTER — HOSPITAL ENCOUNTER (OUTPATIENT)
Dept: ONCOLOGY | Age: 83
Discharge: HOME | End: 2018-05-11

## 2018-05-11 ENCOUNTER — OFFICE VISIT (OUTPATIENT)
Dept: ONCOLOGY | Age: 83
End: 2018-05-11

## 2018-05-11 VITALS
BODY MASS INDEX: 28.32 KG/M2 | DIASTOLIC BLOOD PRESSURE: 87 MMHG | HEART RATE: 90 BPM | SYSTOLIC BLOOD PRESSURE: 155 MMHG | WEIGHT: 145 LBS | TEMPERATURE: 98.2 F

## 2018-05-11 DIAGNOSIS — D69.6 THROMBOCYTOPENIA (HCC): Chronic | ICD-10-CM

## 2018-05-11 DIAGNOSIS — D62 ACUTE BLOOD LOSS ANEMIA: ICD-10-CM

## 2018-05-11 DIAGNOSIS — D69.6 THROMBOCYTOPENIA (HCC): Primary | Chronic | ICD-10-CM

## 2018-05-11 LAB
BASO+EOS+MONOS # BLD AUTO: 0.4 K/UL (ref 0–2.3)
BASO+EOS+MONOS # BLD AUTO: 10 % (ref 0.1–17)
DIFFERENTIAL METHOD BLD: ABNORMAL
ERYTHROCYTE [DISTWIDTH] IN BLOOD BY AUTOMATED COUNT: 12.9 % (ref 11.5–14.5)
HCT VFR BLD AUTO: 35.6 % (ref 36–48)
HGB BLD-MCNC: 11.6 G/DL (ref 12–16)
LYMPHOCYTES # BLD: 1.4 K/UL (ref 1.1–5.9)
LYMPHOCYTES NFR BLD: 36 % (ref 14–44)
MCH RBC QN AUTO: 30.4 PG (ref 25–35)
MCHC RBC AUTO-ENTMCNC: 32.6 G/DL (ref 31–37)
MCV RBC AUTO: 93.2 FL (ref 78–102)
NEUTS SEG # BLD: 2.1 K/UL (ref 1.8–9.5)
NEUTS SEG NFR BLD: 55 % (ref 40–70)
PLATELET # BLD AUTO: 51 K/UL (ref 140–440)
RBC # BLD AUTO: 3.82 M/UL (ref 4.1–5.1)
WBC # BLD AUTO: 3.9 K/UL (ref 4.5–13)

## 2018-05-11 NOTE — MR AVS SNAPSHOT
[address] [facility] [address] [facility] [address] 
[phone] Patient: Moody Mishra MRN: [id number] Visit Information Date & Time Provider Department Dept. Phone Encounter #  
 2018 10:30 AM Rdaha Kramer MD [facility] Office [phone] [id number] Follow-up Instructions Return in about 2 weeks (around 2018). Your Appointments 2018  1:45 PM  
Office Visit with MD Arthur Bacamaddison [address] [facility]) Appt Note: [address] [facility] [address]  
[phone]  
  
   
 [facility] [address] Upcoming Health Maintenance Date Due DTaP/Tdap/Td series (1 - Tdap) 1948 ZOSTER VACCINE AGE 60> 1987 GLAUCOMA SCREENING Q2Y 2017 Pneumococcal 65+ Low/Medium Risk (1 of 2 - PCV13) 2019* Influenza Age 5 to Adult 2018 MEDICARE YEARLY EXAM 2019 *Topic was postponed. The date shown is not the original due date. Allergies as of 2018  Review Complete On: 2018 By: Radha Kramer MD  
  
 Severity Noted Reaction Type Reactions Pcn [Penicillins]  2010   Topical Hives Current Immunizations  Reviewed on 2016 Name Date Influenza Vaccine (Quad) PF 11/3/2016  4:29 PM, 2016  1:59 AM  
  
 Not reviewed this visit You Were Diagnosed With   
  
 Codes Comments Thrombocytopenia ([facility] [address]    -  Primary ICD-10-CM: D69.6 ICD-9-CM: 287.5 Acute blood loss anemia     ICD-10-CM: D62 
ICD-9-CM: 285.1 Vitals BP Pulse Temp Weight(growth percentile) BMI OB Status 155/87 90 98.2 °F (36.8 °C) (Oral) 145 lb (65.8 kg) 28.32 kg/m2 Postmenopausal  
 Smoking Status Never Smoker BMI and BSA Data Body Mass Index Body Surface Area  
 28.32 kg/m 2 1.67 m 2 [facility] [address] [facility] [phone] Your Updated Medication List  
  
   
This list is accurate as of 5/11/18 11:12 AM.  Always use your most recent med list. amLODIPine 5 mg tablet Commonly known as:  Lisa Kowalskiyne Take 5 mg by mouth daily. betamethasone dipropionate 0.05 % topical cream  
Commonly known as:  Dallas Arriaza Use twice a day. cyanocobalamin 1,000 mcg tablet Take 1 Tab by mouth daily. ferrous sulfate 325 mg (65 mg iron) tablet Take 1 Tab by mouth Daily (before breakfast). furosemide 20 mg tablet Commonly known as:  LASIX Take 1 Tab by mouth daily. losartan 50 mg tablet Commonly known as:  COZAAR Take 1 Tab by mouth two (2) times a day. LOTRISONE topical cream  
Generic drug:  clotrimazole-betamethasone Apply  to affected area two (2) times a day. metoprolol tartrate 50 mg tablet Commonly known as:  LOPRESSOR Take 1 Tab by mouth daily. pantoprazole 40 mg tablet Commonly known as:  PROTONIX Take 1 Tab by mouth daily. polyethylene glycol 17 gram/dose powder Commonly known as:  Shola Barrs Take 17 g by mouth daily. pravastatin 10 mg tablet Commonly known as:  PRAVACHOL Take 1 Tab by mouth nightly. senna-docusate 8.6-50 mg per tablet Commonly known as:  Joann Chalk Take 1 tablet by mouth daily. We Performed the Following COMPLETE CBC & AUTO DIFF WBC [62428 CPT(R)] FERRITIN [94921 CPT(R)] IMMUNOPHENOTYPING PROFILE [MJW96127 Custom] IRON PROFILE T0880882 CPT(R)] METABOLIC PANEL, COMPREHENSIVE [07753 CPT(R)] PROTEIN ELECTROPHORESIS [53077 CPT(R)] Follow-up Instructions Return in about 2 weeks (around 5/25/2018). To-Do List   
 05/11/2018 Lab:  CBC WITH 3 PART DIFF   
  
 05/11/2018 11:20 AM  
  Appointment with Chelsea De Oliveira at [facility] ([phone]) Patient Instructions Thrombocytopenia: Care Instructions Your Care Instructions Thrombocytopenia is a low number of platelets in the blood. Platelets are the cells that help blood clot. If you don't have enough of them, your blood cannot clot well. So it is harder to stop bleeding. You may have low platelets because your bone marrow does not make them. Or your body's defenses (immune system) may destroy them. Having an enlarged spleen can also reduce the number of platelets in your blood. This is because they can get trapped in the enlarged spleen. Some diseases or medicines may also cause low platelets. But platelets may go back to normal levels if the disease is treated or the medicine is stopped. You may not need treatment if your problem is mild. If you do need treatment, you may have platelets added to your blood. Or you may get medicine to stop the loss of platelets or help your body make them. Follow-up care is a key part of your treatment and safety. Be sure to make and go to all appointments, and call your doctor if you are having problems. It's also a good idea to know your test results and keep a list of the medicines you take. How can you care for yourself at home? · Be safe with medicines. Take your medicines exactly as prescribed. Call your doctor if you think you are having a problem with your medicine. · Do not take aspirin or anti-inflammatory medicines unless your doctor says it is okay. Examples are ibuprofen (Advil, Motrin) and naproxen (Aleve). They may increase the risk of bleeding. · Avoid contact sports or activities that could cause you to fall. When should you call for help? Call 911 anytime you think you may need emergency care. For example, call if: 
? · You passed out (lost consciousness). ? · You have signs of severe bleeding, which includes: 
¨ You have a severe headache that is different from past headaches. ¨ You vomit blood or what looks like coffee grounds. ¨ Your stools are maroon or very bloody. ?Call your doctor now or seek immediate medical care if: 
? · You are dizzy or lightheaded, or you feel like you may faint. ? · You have abnormal bleeding, such as: 
¨ Your stools are black and look like tar, or they have streaks of blood. ¨ You have blood in your urine. ¨ You have joint pain. ¨ You have bruises or blood spots under your skin. ? Watch closely for changes in your health, and be sure to contact your doctor if: 
? · You do not get better as expected. Where can you learn more? Go to http://cachorro-itzel.info/. Enter Z692 in the search box to learn more about \"Thrombocytopenia: Care Instructions. \" Current as of: October 13, 2016 Content Version: 11.4 © 7771-4600 Front Up. Care instructions adapted under license by [employer]here[employer] (which disclaims liability or warranty for this information). If you have questions about a medical condition or this instruction, always ask your healthcare professional. [address] any warranty or liability for your use of this information. Introducing [facility]! Catalina Pelaez introduces DataFlyte patient portal. Now you can access parts of your medical record, email your doctor's office, and request medication refills online. 1. In your internet browser, go to [email or web address] 2. Click on the First Time User? Click Here link in the Sign In box. You will see the New Member Sign Up page. 3. Enter your DataFlyte Access Code exactly as it appears below. You will not need to use this code after youve completed the sign-up process. If you do not sign up before the expiration date, you must request a new code. · DataFlyte Access Code: [email or web address] Expires: 7/29/2018 10:06 AM 
 
4.  Enter the last four digits of your Social Security Number (xxxx) and Date of Birth (mm/dd/yyyy) as indicated and click Submit. You will be taken to the next sign-up page. 5. Create a [facility] ID. This will be your [facility] login ID and cannot be changed, so think of one that is secure and easy to remember. 6. Create a [facility] [facility] password. You can change your password at any time. 7. Enter your Password Reset Question and Answer. This can be used at a later time if you forget your password. 8. Enter your e-mail address. You will receive e-mail notification when new information is available in [address] 9. Click Sign Up. You can now view and download portions of your medical record. 10. Click the Download Summary menu link to download a portable copy of your medical information. If you have questions, please visit the Frequently Asked Questions section of the [employer] [facility] website. Remember, [facility] [facility] is NOT to be used for urgent needs. For medical emergencies, dial 911. Now available from your iPhone and Android! Please provide this summary of care documentation to your next provider. Your primary care clinician is listed as Nabor Funes. If you have any questions after today's visit, please call [phone].

## 2018-05-11 NOTE — PATIENT INSTRUCTIONS
Thrombocytopenia: Care Instructions  Your Care Instructions    Thrombocytopenia is a low number of platelets in the blood. Platelets are the cells that help blood clot. If you don't have enough of them, your blood cannot clot well. So it is harder to stop bleeding. You may have low platelets because your bone marrow does not make them. Or your body's defenses (immune system) may destroy them. Having an enlarged spleen can also reduce the number of platelets in your blood. This is because they can get trapped in the enlarged spleen. Some diseases or medicines may also cause low platelets. But platelets may go back to normal levels if the disease is treated or the medicine is stopped. You may not need treatment if your problem is mild. If you do need treatment, you may have platelets added to your blood. Or you may get medicine to stop the loss of platelets or help your body make them. Follow-up care is a key part of your treatment and safety. Be sure to make and go to all appointments, and call your doctor if you are having problems. It's also a good idea to know your test results and keep a list of the medicines you take. How can you care for yourself at home? · Be safe with medicines. Take your medicines exactly as prescribed. Call your doctor if you think you are having a problem with your medicine. · Do not take aspirin or anti-inflammatory medicines unless your doctor says it is okay. Examples are ibuprofen (Advil, Motrin) and naproxen (Aleve). They may increase the risk of bleeding. · Avoid contact sports or activities that could cause you to fall. When should you call for help? Call 911 anytime you think you may need emergency care. For example, call if:  ? · You passed out (lost consciousness). ? · You have signs of severe bleeding, which includes:  ¨ You have a severe headache that is different from past headaches. ¨ You vomit blood or what looks like coffee grounds.   ¨ Your stools are maroon or very bloody. ?Call your doctor now or seek immediate medical care if:  ? · You are dizzy or lightheaded, or you feel like you may faint. ? · You have abnormal bleeding, such as:  ¨ Your stools are black and look like tar, or they have streaks of blood. ¨ You have blood in your urine. ¨ You have joint pain. ¨ You have bruises or blood spots under your skin. ? Watch closely for changes in your health, and be sure to contact your doctor if:  ? · You do not get better as expected. Where can you learn more? Go to http://cachorro-itzel.info/. Enter E860 in the search box to learn more about \"Thrombocytopenia: Care Instructions. \"  Current as of: October 13, 2016  Content Version: 11.4  © 0968-5241 [employer]. Care instructions adapted under license by Bitrockr (which disclaims liability or warranty for this information). If you have questions about a medical condition or this instruction, always ask your healthcare professional. [place] [address] any warranty or liability for your use of this information.

## 2018-05-11 NOTE — PROGRESS NOTES
Hematology/Oncology Consultation Note    Name: Stacey Melendez  Date: 2018  : 1927    PCP: Beronica Dominguez NP       Ms. Selena Stinson  is a [address] y[address]o. -American woman who is referred for evaluation of thrombocytopenia. Subjective:   Chief complaint: Low platelet count    History of present illness:  Ms. Selena Stinson is an 80-year-old -American woman who is accompanied by her daughter. She has evidence of dementia. She is here for an assessment of her thrombocytopenia. The daughter thinks that the patient may have had a history of rectal bleeding at least 4 years ago. There is also question of whether or not she had a prior history of DVT and she may have been on Coumadin. 4 years ago apparently she had an IVC filter placed. She has not been treated for platelet problems in the past.  A recent CBC dated 2018 showed a WBC count of 4.8, hemoglobin 11 g/dL, hematocrit 33%, and the platelet count was 48,373. She is here today for complete assessment. She denies having unexplained bruising or any evidence of recent or current bleeding.     Past Medical History:   Diagnosis Date    Anemia 2010    thrombocytopenia    Anxiety 2010    Atrial fibrillation ([address] 2010    Atrial flutter (HCC)     recurrent    CAD (coronary artery disease) 8/3/2010    Cataract 2010    Coronary atherosclerosis of native coronary artery     CVA (cerebral infarction)     Depression 3/18/2014    Diverticulitis 2014    DVT (deep venous thrombosis) ([facility] [address] 2007    Dysarthria     Essential hypertension, benign     GERD (gastroesophageal reflux disease) 2010    History of blood transfusion 2016    HLD (hyperlipidemia) 2010    HTN (hypertension) 2010    Intermediate coronary syndrome (HCC)     Nonspecific abnormal electrocardiogram (ECG) (EKG)     Osteoporosis 2016    Pre-operative cardiovascular examination     PSVT (paroxysmal supraventricular tachycardia) ([facility] 1/11/2010    Pulmonary embolism ([facility] [address] 1/1/2007    Right knee pain 1/11/2010    Stroke ([facility]     Syncope 5/13/2009    Thrombocytosis ([facility]     Unspecified combined systolic and diastolic heart failure        Allergies   Allergen Reactions    Pcn [Penicillins] Hives       Past Surgical History:   Procedure Laterality Date    CARDIAC SURG PROCEDURE UNLIST      ablation    HX CATARACT REMOVAL      bilateral    HX CHOLECYSTECTOMY      HX OTHER SURGICAL      IVC filter    HX PACEMAKER      IR IVC FILTER PLACEMENT PERCUTANEOUS  2007       Social History     Social History    Marital status:      Spouse name: N/A    Number of children: N/A    Years of education: N/A     Occupational History    Not on file. Social History Main Topics    Smoking status: Never Smoker    Smokeless tobacco: Never Used    Alcohol use No    Drug use: No    Sexual activity: No     Other Topics Concern    Not on file     Social History Narrative       Family History   Problem Relation Age of Onset   Hernandez Arthritis-osteo Mother     Hypertension Mother     Arthritis-osteo Father     Coronary Artery Disease Neg Hx        Current Outpatient Prescriptions   Medication Sig Dispense Refill    ferrous sulfate 325 mg (65 mg iron) tablet Take 1 Tab by mouth Daily (before breakfast). 180 Tab 0    clotrimazole-betamethasone (LOTRISONE) topical cream Apply  to affected area two (2) times a day.  pantoprazole (PROTONIX) 40 mg tablet Take 1 Tab by mouth daily. 90 Tab 1    furosemide (LASIX) 20 mg tablet Take 1 Tab by mouth daily. 30 Tab 1    losartan (COZAAR) 50 mg tablet Take 1 Tab by mouth two (2) times a day. 180 Tab 1    metoprolol tartrate (LOPRESSOR) 50 mg tablet Take 1 Tab by mouth daily. 90 Tab 1    pravastatin (PRAVACHOL) 10 mg tablet Take 1 Tab by mouth nightly. 90 Tab 1    betamethasone dipropionate (DIPROSONE) 0.05 % topical cream Use twice a day.  60 g 5    amLODIPine (NORVASC) 5 mg tablet Take 5 mg by mouth daily.  cyanocobalamin 1,000 mcg tablet Take 1 Tab by mouth daily. 30 Tab 1    polyethylene glycol (MIRALAX) 17 gram/dose powder Take 17 g by mouth daily. 527 g 1    senna-docusate (PERICOLACE) 8.6-50 mg per tablet Take 1 tablet by mouth daily. 30 tablet 0     Review of Systems    General ROS:The patient has no complaints and there is no physical distress evident. Psychological ROS: patient denies having any psychological symptoms such as hallucinations, depression or anxiety. Ophthalmic ROS:the patient denies having any visual impairment or eye discomfort. ENT ROS: there are no abnormalities reported. Allergy and Immunology ROS:the patient denies having any seasonal allergies or allergies to medications other than those already outlined above. Hematological and Lymphatic ROS: the patient denies having any bruising, bleeding or lymphadenopathy. Endocrine ROS: the patient denies having any heat or cold intolerance. There is no history of diabetes or thyroid disorders. Breast ROS: the patient denies having any history of breast mass, nipple discharge, or lumps. Respiratory ROS:the patient denies having any cough, shortness of breath, or dyspnea on exertion. Cardiovascular ROS: there are no complaints of chest pain, palpitations, chest pounding, or dyspnea on exertion. Gastrointestinal ROS: the patient denies having nausea, emesis, diarrhea, constipation, or blood in the stool. Genito-Urinary ROS: the patient denies having urinary urgency, frequency, or dysuria. Musculoskeletal ROS: with the exception of mild arthralgias the patient has no other musculoskeletal complaints. Neurological ROS: the patient denies having any numbness, tingling, or neurologic deficits. Dermatological ROS:patient denies having any unexplained rash, skin ulcerations, or hives.       Objective:     Visit Vitals    /87    Pulse 90    Temp 98.2 °F (36.8 °C) (Oral)    Wt 65.8 kg (145 lb)    BMI 28.32 kg/m2 Physical Exam:   Gen. Appearance: the patient is in no acute distress. Skin: There is no evidence of bruise or rash. HEENT: The head is normocephalic and atraumatic. The conjunctiva and sclera are clear. Pupils are equal, round, reactive to light, and accommodation. The extraocular movements are intact. ENT reveals no oral mucosal lesions or ulcerations. Neck: Supple without lymphadenopathy or thyromegaly. Lungs: Clear to auscultation and percussion; there are no wheezes or rhonchi. Heart: Regular rate and rhythm; there are no murmurs, gallops, or rubs. Abdomen: Bowel sounds are present and normal.  There is no guarding, tenderness, or hepatosplenomegaly. Extremities: There is no clubbing, cyanosis, or edema. Neurologic: There are no focal neurologic deficits. Lymphatics: There is no palpable peripheral lymphadenopathy. Lab data:  Lab data from 4/30/2018 shows a sodium of 143, potassium 3.6, chloride 106, CO2 29, glucose 90, BUN 10, creatinine 0.94, calcium 11, total bilirubin 0.3, total protein 8, ALT 8, AST 20, and alkaline phosphatase 53. The CBC dated 4/30/2018 shows WBC count of 4.8, hemoglobin 11 g/dL, hematocrit 33%, and the platelet count 60,654. 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 Anemia: I suspect that the patient may be developing iron deficiency anemia versus a plasma cell dyscrasia    Plan: Thrombocytopenia: At this time I will order an immunophenotyping profile and a repeat CBC and comprehensive metabolic panel. Anemia: The comprehensive iron profile, and ferritin levels will be ordered. Metabolic panel, CBC, SPEP, I will have the patient return to clinic in 2 weeks to review lab data and to discuss options of management.     Follow-up in 2 weeks    Orders Placed This Encounter    COMPLETE CBC & AUTO DIFF WBC    METABOLIC PANEL, COMPREHENSIVE     Standing Status:   Future     Number of Occurrences:   1     Standing Expiration Date:   5/12/2019    IMMUNOPHENOTYPING PROFILE     Standing Status:   Future     Number of Occurrences:   1     Standing Expiration Date:   5/12/2019     Order Specific Question:   Specimen type     Answer:   Blood [2]    SPEP     Standing Status:   Future     Number of Occurrences:   1     Standing Expiration Date:   5/12/2019    IRON PROFILE     Standing Status:   Future     Number of Occurrences:   1     Standing Expiration Date:   5/12/2019    FERRITIN     Standing Status:   Future     Number of Occurrences:   1     Standing Expiration Date:   5/12/2019    InHouse CBC ([facility])     Standing Status:   Future     Number of Occurrences:   1     Standing Expiration Date:   5/18/2018    IMMUNOPHENOTYPING PROFILE    METABOLIC PANEL, COMPREHENSIVE    PROTEIN ELECTROPHORESIS    IRON PROFILE    FERRITIN           Tiffany López MD  5/11/2018      Please note: This document has been produced using voice recognition software. Unrecognized errors in transcription may be present.

## 2018-05-15 LAB
ALBUMIN SERPL ELPH-MCNC: 4 G/DL (ref 2.9–4.4)
ALBUMIN SERPL-MCNC: 4.4 G/DL (ref 3.2–4.6)
ALBUMIN/GLOB SERPL: 1 {RATIO} (ref 0.7–1.7)
ALBUMIN/GLOB SERPL: 1.2 {RATIO} (ref 1.2–2.2)
ALP SERPL-CCNC: 53 IU/L (ref 39–117)
ALPHA1 GLOB SERPL ELPH-MCNC: 0.2 G/DL (ref 0–0.4)
ALPHA2 GLOB SERPL ELPH-MCNC: 0.6 G/DL (ref 0.4–1)
ALT SERPL-CCNC: 9 IU/L (ref 0–32)
ANALYSIS AND GATING STRATEGY: NORMAL
ANNOTATION COMMENT IMP: NORMAL
ASSESSMENT OF LEUKOCYTES: NORMAL
AST SERPL-CCNC: 19 IU/L (ref 0–40)
B-GLOBULIN SERPL ELPH-MCNC: 1.2 G/DL (ref 0.7–1.3)
BILIRUB SERPL-MCNC: 0.3 MG/DL (ref 0–1.2)
BUN SERPL-MCNC: 10 MG/DL (ref 10–36)
BUN/CREAT SERPL: 11 (ref 12–28)
CALCIUM SERPL-MCNC: 9.2 MG/DL (ref 8.7–10.3)
CHLORIDE SERPL-SCNC: 100 MMOL/L (ref 96–106)
CLINICAL INFO: NORMAL
CO2 SERPL-SCNC: 26 MMOL/L (ref 18–29)
COMMENT , 490046: NORMAL
CREAT SERPL-MCNC: 0.89 MG/DL (ref 0.57–1)
FERRITIN SERPL-MCNC: 804 NG/ML (ref 15–150)
GAMMA GLOB SERPL ELPH-MCNC: 2.2 G/DL (ref 0.4–1.8)
GFR SERPLBLD CREATININE-BSD FMLA CKD-EPI: 57 ML/MIN/1.73
GFR SERPLBLD CREATININE-BSD FMLA CKD-EPI: 66 ML/MIN/1.73
GLOBULIN SER CALC-MCNC: 3.8 G/DL (ref 1.5–4.5)
GLOBULIN SER CALC-MCNC: 4.2 G/DL (ref 2.2–3.9)
GLUCOSE SERPL-MCNC: 75 MG/DL (ref 65–99)
IMMUNOPHENOTYPING STUDY: NORMAL
IRON SATN MFR SERPL: 36 % (ref 15–55)
IRON SERPL-MCNC: 97 UG/DL (ref 27–139)
M PROTEIN SERPL ELPH-MCNC: 1 G/DL
PATH INTERP SPEC-IMP: NORMAL
PATHOLOGIST NAME: NORMAL
PLEASE NOTE, 011150: ABNORMAL
POTASSIUM SERPL-SCNC: 3.6 MMOL/L (ref 3.5–5.2)
PROT SERPL-MCNC: 8.2 G/DL (ref 6–8.5)
SODIUM SERPL-SCNC: 144 MMOL/L (ref 134–144)
SPECIMEN SOURCE: NORMAL
TIBC SERPL-MCNC: 267 UG/DL (ref 250–450)
UIBC SERPL-MCNC: 170 UG/DL (ref 118–369)
VIABLE CELLS NFR SPEC: NORMAL %

## 2018-06-05 ENCOUNTER — OFFICE VISIT (OUTPATIENT)
Dept: ONCOLOGY | Age: 83
End: 2018-06-05

## 2018-06-05 ENCOUNTER — HOSPITAL ENCOUNTER (OUTPATIENT)
Dept: ONCOLOGY | Age: 83
Discharge: HOME | End: 2018-06-05

## 2018-06-05 VITALS
WEIGHT: 145 LBS | SYSTOLIC BLOOD PRESSURE: 161 MMHG | BODY MASS INDEX: 28.32 KG/M2 | DIASTOLIC BLOOD PRESSURE: 87 MMHG | TEMPERATURE: 99.1 F | HEART RATE: 91 BPM

## 2018-06-05 DIAGNOSIS — D47.2 MONOCLONAL GAMMOPATHY OF UNDETERMINED SIGNIFICANCE: ICD-10-CM

## 2018-06-05 DIAGNOSIS — D69.6 THROMBOCYTOPENIA (HCC): Chronic | ICD-10-CM

## 2018-06-05 DIAGNOSIS — D47.2 MONOCLONAL GAMMOPATHY OF UNDETERMINED SIGNIFICANCE: Primary | ICD-10-CM

## 2018-06-05 LAB
BASO+EOS+MONOS # BLD AUTO: 0.3 K/UL (ref 0–2.3)
BASO+EOS+MONOS # BLD AUTO: 6 % (ref 0.1–17)
DIFFERENTIAL METHOD BLD: ABNORMAL
ERYTHROCYTE [DISTWIDTH] IN BLOOD BY AUTOMATED COUNT: 13.3 % (ref 11.5–14.5)
HCT VFR BLD AUTO: 33.7 % (ref 36–48)
HGB BLD-MCNC: 11 G/DL (ref 12–16)
LYMPHOCYTES # BLD: 1.8 K/UL (ref 1.1–5.9)
LYMPHOCYTES NFR BLD: 43 % (ref 14–44)
MCH RBC QN AUTO: 30.9 PG (ref 25–35)
MCHC RBC AUTO-ENTMCNC: 32.6 G/DL (ref 31–37)
MCV RBC AUTO: 94.7 FL (ref 78–102)
NEUTS SEG # BLD: 2.1 K/UL (ref 1.8–9.5)
NEUTS SEG NFR BLD: 51 % (ref 40–70)
PLATELET # BLD AUTO: 45 K/UL (ref 135–420)
RBC # BLD AUTO: 3.56 M/UL (ref 4.1–5.1)
WBC # BLD AUTO: 4.2 K/UL (ref 4.5–13)

## 2018-06-05 NOTE — PROGRESS NOTES
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 Cunha in a relative that the CBC from today shows that her WBC count is 4.2, hemoglobin is 11 g/dL, hematocrit 33.7%, platelets remain low at 27,000. The CBC dated 5/11/2018 showed that she had a WBC count of 3.9, hemoglobin 11.6 g/dL, hematocrit 35.6%, and the platelet count was 22,424. The iron profile revealed that she had an iron level of 97 mcg/dL, and iron saturation of 36%, and a ferritin level of 804. However, an immunophenotyping profile revealed no immunophenotypic aberrancy in her leukocyte populations. The SPEP revealed an abnormal monoclonal paraprotein measuring 1 g/dL. I am concerned that she has smoldering multiple myeloma versus MGUS. Therefore I am recommending a 24-hour urine collection for urine protein electrophoresis, urine immunofixation, and a skeletal bone survey. Additionally a serum immunofixation will be ordered. The patient will be referred to the interventional radiologist to undergo a bone marrow biopsy to have define or rule out multiple myeloma. With evidence of anemia and thrombocytopenia with a low WBC count this constellation constitutes pancytopenia. I suspect that she has full-blown multiple myeloma. We will schedule the patient to begin gammaglobulin therapy later this week as a primary treatment modality while waiting for the results of these additional tests. I will see her back in 2-3 weeks to discuss potential options of management based on the additional lab data and radiographic data. The patient and her relatives had their questions answered to their satisfaction. Total time 30 minutes, greater than 50% of the time was in counseling and coordination of care. Alvin Schwartz MD, [place]

## 2018-06-05 NOTE — PATIENT INSTRUCTIONS
Complete Blood Count (CBC): About This Test  What is it? A complete blood count (CBC) is a blood test that gives important information about your blood cells, especially red blood cells, white blood cells, and platelets. Why is this test done? A CBC may be done as part of a regular physical exam. There are many other reasons that a doctor may want this blood test, including to:  · Find the cause of symptoms such as fatigue, weakness, fever, bruising, or weight loss. · Find anemia or an infection. · See how much blood has been lost if there is bleeding. · Diagnose diseases of the blood, such as leukemia or polycythemia. How can you prepare for the test?  You do not need to do anything before having this test.  What happens during the test?  The health professional taking a sample of your blood will:  · Wrap an elastic band around your upper arm. This makes the veins below the band larger so it is easier to put a needle into the vein. · Clean the needle site with alcohol. · Put the needle into the vein. · Attach a tube to the needle to fill it with blood. · Remove the band from your arm when enough blood is collected. · Put a gauze pad or cotton ball over the needle site as the needle is removed. · Put pressure on the site and then put on a bandage. If this blood test is done on a baby, a heel stick may be done instead of a blood draw from a vein. What happens after the test?  · You will probably be able to go home right away. · You can go back to your usual activities right away. Follow-up care is a key part of your treatment and safety. Be sure to make and go to all appointments, and call your doctor if you are having problems. It's also a good idea to keep a list of the medicines you take. Ask your doctor when you can expect to have your test results. Where can you learn more? Go to http://cachorro-itzel.info/.   Enter K165 in the search box to learn more about \"Complete Blood Count (CBC): About This Test.\"  Current as of: October 14, 2016  Content Version: 11.4  © 2855-9777 Healthwise, Incorporated. Care instructions adapted under license by [employer] (which disclaims liability or warranty for this information). If you have questions about a medical condition or this instruction, always ask your healthcare professional. [place] [address] any warranty or liability for your use of this information.

## 2018-06-05 NOTE — MR AVS SNAPSHOT
[address] [facility] [address] [facility] [address] 
[phone] Patient: Tricia Eldridge MRN: [id number] Visit Information Date & Time Provider Department Dept. Phone Encounter #  
 2018  1:30 PM Yoseph Morales MD [address] [facility]  [phone] [id number] Follow-up Instructions Return in about 3 weeks (around 2018). Your Appointments 2018  2:30 PM  
Office Visit with Yoseph Morales MD  
[address] [facility]  [address]) Appt Note: BM, BONE SCAN & 24HR URINE RESULTS  
 [address] [facility] [address] [phone]  
  
   
 [address] Upcoming Health Maintenance Date Due DTaP/Tdap/Td series (1 - Tdap) 1948 ZOSTER VACCINE AGE 60> 1987 GLAUCOMA SCREENING Q2Y 2017 Pneumococcal 65+ Low/Medium Risk (1 of 2 - PCV13) 2019* Influenza Age 5 to Adult 2018 MEDICARE YEARLY EXAM 2019 *Topic was postponed. The date shown is not the original due date. Allergies as of 2018  Review Complete On: 2018 By: Yoseph Morales MD  
  
 Severity Noted Reaction Type Reactions Pcn [Penicillins]  2010   Topical Hives Current Immunizations  Reviewed on 2016 Name Date Influenza Vaccine (Quad) PF 11/3/2016  4:29 PM, 2016  1:59 AM  
  
 Not reviewed this visit You Were Diagnosed With   
  
 Codes Comments Monoclonal gammopathy of undetermined significance    -  Primary ICD-10-CM: M08.7 ICD-9-CM: 273.1 Thrombocytopenia ([address]     ICD-10-CM: D69.6 ICD-9-CM: 287.5 Vitals BP Pulse Temp Weight(growth percentile) BMI OB Status 161/87 91 99.1 °F (37.3 °C) (Oral) 145 lb (65.8 kg) 28.32 kg/m2 Postmenopausal  
 Smoking Status Never Smoker BMI and BSA Data Body Mass Index Body Surface Area  
 28.32 kg/m 2 1.67 m 2 Preferred Pharmacy Pharmacy Name Phone [address] [place] [address], [address] [place] [address] [phone] Your Updated Medication List  
  
   
This list is accurate as of 6/5/18  2:22 PM.  Always use your most recent med list. amLODIPine 5 mg tablet Commonly known as:  Oralia Raddle Take 5 mg by mouth daily. betamethasone dipropionate 0.05 % topical cream  
Commonly known as:  Veronica Quiet Use twice a day. cyanocobalamin 1,000 mcg tablet Take 1 Tab by mouth daily. ferrous sulfate 325 mg (65 mg iron) tablet Take 1 Tab by mouth Daily (before breakfast). furosemide 20 mg tablet Commonly known as:  LASIX Take 1 Tab by mouth daily. losartan 50 mg tablet Commonly known as:  COZAAR Take 1 Tab by mouth two (2) times a day. LOTRISONE topical cream  
Generic drug:  clotrimazole-betamethasone Apply  to affected area two (2) times a day. metoprolol tartrate 50 mg tablet Commonly known as:  LOPRESSOR Take 1 Tab by mouth daily. pantoprazole 40 mg tablet Commonly known as:  PROTONIX Take 1 Tab by mouth daily. polyethylene glycol 17 gram/dose powder Commonly known as:  Sabrina Cancel Take 17 g by mouth daily. pravastatin 10 mg tablet Commonly known as:  PRAVACHOL Take 1 Tab by mouth nightly. senna-docusate 8.6-50 mg per tablet Commonly known as:  Demaris Louis Take 1 tablet by mouth daily. Follow-up Instructions Return in about 3 weeks (around 6/26/2018). To-Do List   
 06/05/2018 Lab:  IMMUNOELECTROPHORESIS (IMMUNOFIX.)   
  
 06/05/2018 Lab:  IMMUNOGLOBULINS, G/A/M, QT. 06/06/2018 Lab:  BETA-2 MICROGLOBULIN   
  
 06/06/2018 Lab:  FREE LIGHT CHAINS, KAPPA/LAMBDA, QT Patient Instructions Complete Blood Count (CBC): About This Test 
What is it? A complete blood count (CBC) is a blood test that gives important information about your blood cells, especially red blood cells, white blood cells, and platelets. Why is this test done? A CBC may be done as part of a regular physical exam. There are many other reasons that a doctor may want this blood test, including to: · Find the cause of symptoms such as fatigue, weakness, fever, bruising, or weight loss. · Find anemia or an infection. · See how much blood has been lost if there is bleeding. · Diagnose diseases of the blood, such as leukemia or polycythemia. How can you prepare for the test? 
You do not need to do anything before having this test. 
What happens during the test? 
The health professional taking a sample of your blood will: · Wrap an elastic band around your upper arm. This makes the veins below the band larger so it is easier to put a needle into the vein. · Clean the needle site with alcohol. · Put the needle into the vein. · Attach a tube to the needle to fill it with blood. · Remove the band from your arm when enough blood is collected. · Put a gauze pad or cotton ball over the needle site as the needle is removed. · Put pressure on the site and then put on a bandage. If this blood test is done on a baby, a heel stick may be done instead of a blood draw from a vein. What happens after the test? 
· You will probably be able to go home right away. · You can go back to your usual activities right away. Follow-up care is a key part of your treatment and safety. Be sure to make and go to all appointments, and call your doctor if you are having problems. It's also a good idea to keep a list of the medicines you take. Ask your doctor when you can expect to have your test results. Where can you learn more? Go to http://cachorro-itzel.info/.  
Enter F803 in the search box to learn more about \"Complete Blood Count (CBC): About This Test.\" 
 Current as of: October 14, 2016 Content Version: 11.4 © 3126-0572 Healthwise, [employer]. Care instructions adapted under license by [employer] (which disclaims liability or warranty for this information). If you have questions about a medical condition or this instruction, always ask your healthcare professional. [address] any warranty or liability for your use of this information. Introducing [facility] & HEALTH SERVICES! [facility] introduces [employer] patient portal. Now you can access parts of your medical record, email your doctor's office, and request medication refills online. 1. In your internet browser, go to [email or web address] 2. Click on the First Time User? Click Here link in the Sign In box. You will see the New Member Sign Up page. 3. Enter your [employer] Access Code exactly as it appears below. You will not need to use this code after youve completed the sign-up process. If you do not sign up before the expiration date, you must request a new code. · [employer] Access Code: [email or web address] Expires: 7/29/2018 10:06 AM 
 
4. Enter the last four digits of your Social Security Number (xxxx) and Date of Birth (mm/dd/yyyy) as indicated and click Submit. You will be taken to the next sign-up page. 5. Create a [employer] ID. This will be your [employer] login ID and cannot be changed, so think of one that is secure and easy to remember. 6. Create a [employer] password. You can change your password at any time. 7. Enter your Password Reset Question and Answer. This can be used at a later time if you forget your password. 8. Enter your e-mail address. You will receive e-mail notification when new information is available in [address] 9. Click Sign Up. You can now view and download portions of your medical record. 10. Click the Download Summary menu link to download a portable copy of your medical information. If you have questions, please visit the Frequently Asked Questions section of the [employer] website. Remember, [employer] is NOT to be used for urgent needs. For medical emergencies, dial 911. Now available from your iPhone and Android! Please provide this summary of care documentation to your next provider. Your primary care clinician is listed as Baljit Brownlee. If you have any questions after today's visit, please call [phone].

## 2018-06-07 LAB
B2 MICROGLOB SERPL-MCNC: 2.4 MG/L (ref 0.6–2.4)
IGA SERPL-MCNC: 114 MG/DL (ref 64–422)
IGG SERPL-MCNC: 2247 MG/DL (ref 700–1600)
IGM SERPL-MCNC: 103 MG/DL (ref 26–217)
KAPPA LC FREE SER-MCNC: 58.6 MG/L (ref 3.3–19.4)
KAPPA LC FREE/LAMBDA FREE SER: 2.05 {RATIO} (ref 0.26–1.65)
LAMBDA LC FREE SERPL-MCNC: 28.6 MG/L (ref 5.7–26.3)
PROT PATTERN SERPL IFE-IMP: ABNORMAL

## 2018-07-19 DIAGNOSIS — R60.0 BILATERAL LOWER EXTREMITY EDEMA: ICD-10-CM

## 2018-07-19 RX ORDER — FUROSEMIDE 20 MG/1
TABLET ORAL
Qty: 30 TAB | Refills: 1 | Status: SHIPPED | OUTPATIENT
Start: 2018-07-19 | End: 2019-01-01 | Stop reason: SDUPTHER

## 2018-09-17 DIAGNOSIS — I10 ESSENTIAL HYPERTENSION: ICD-10-CM

## 2018-09-17 RX ORDER — METOPROLOL TARTRATE 50 MG/1
TABLET ORAL
Qty: 90 TAB | Refills: 1 | Status: SHIPPED | OUTPATIENT
Start: 2018-09-17 | End: 2019-01-01 | Stop reason: SDUPTHER

## 2019-01-01 ENCOUNTER — APPOINTMENT (OUTPATIENT)
Dept: GENERAL RADIOLOGY | Age: 84
End: 2019-01-01
Attending: EMERGENCY MEDICINE
Payer: MEDICARE

## 2019-01-01 ENCOUNTER — APPOINTMENT (OUTPATIENT)
Dept: CT IMAGING | Age: 84
End: 2019-01-01
Attending: EMERGENCY MEDICINE
Payer: MEDICARE

## 2019-01-01 ENCOUNTER — HOSPITAL ENCOUNTER (EMERGENCY)
Age: 84
Discharge: HOME | End: 2019-09-20
Attending: EMERGENCY MEDICINE
Payer: MEDICARE

## 2019-01-01 ENCOUNTER — OFFICE VISIT (OUTPATIENT)
Dept: FAMILY MEDICINE CLINIC | Age: 84
End: 2019-01-01

## 2019-01-01 VITALS
TEMPERATURE: 98.1 F | HEART RATE: 80 BPM | SYSTOLIC BLOOD PRESSURE: 189 MMHG | DIASTOLIC BLOOD PRESSURE: 102 MMHG | RESPIRATION RATE: 16 BRPM | BODY MASS INDEX: 24.02 KG/M2 | WEIGHT: 127.2 LBS | HEIGHT: 61 IN

## 2019-01-01 VITALS
DIASTOLIC BLOOD PRESSURE: 67 MMHG | SYSTOLIC BLOOD PRESSURE: 159 MMHG | RESPIRATION RATE: 14 BRPM | HEART RATE: 80 BPM | TEMPERATURE: 97.8 F | OXYGEN SATURATION: 100 %

## 2019-01-01 DIAGNOSIS — Z00.00 MEDICARE ANNUAL WELLNESS VISIT, SUBSEQUENT: Primary | ICD-10-CM

## 2019-01-01 DIAGNOSIS — E87.6 HYPOKALEMIA: ICD-10-CM

## 2019-01-01 DIAGNOSIS — N30.01 ACUTE CYSTITIS WITH HEMATURIA: Primary | ICD-10-CM

## 2019-01-01 DIAGNOSIS — D69.6 DECREASED PLATELET COUNT (HCC): Primary | ICD-10-CM

## 2019-01-01 DIAGNOSIS — D50.8 OTHER IRON DEFICIENCY ANEMIA: ICD-10-CM

## 2019-01-01 DIAGNOSIS — R60.0 BILATERAL LOWER EXTREMITY EDEMA: ICD-10-CM

## 2019-01-01 DIAGNOSIS — R55 SYNCOPE AND COLLAPSE: ICD-10-CM

## 2019-01-01 DIAGNOSIS — I10 ESSENTIAL HYPERTENSION: ICD-10-CM

## 2019-01-01 DIAGNOSIS — K21.9 GASTROESOPHAGEAL REFLUX DISEASE WITHOUT ESOPHAGITIS: ICD-10-CM

## 2019-01-01 DIAGNOSIS — D61.818 PANCYTOPENIA (HCC): ICD-10-CM

## 2019-01-01 DIAGNOSIS — E78.00 PURE HYPERCHOLESTEROLEMIA: ICD-10-CM

## 2019-01-01 LAB
ALBUMIN SERPL-MCNC: 3.4 G/DL (ref 3.4–5)
ALBUMIN SERPL-MCNC: 4.2 G/DL (ref 3.2–4.6)
ALBUMIN/GLOB SERPL: 0.9 {RATIO} (ref 0.8–1.7)
ALBUMIN/GLOB SERPL: 1.2 {RATIO} (ref 1.2–2.2)
ALP SERPL-CCNC: 44 U/L (ref 45–117)
ALP SERPL-CCNC: 51 IU/L (ref 39–117)
ALT SERPL-CCNC: 11 IU/L (ref 0–32)
ALT SERPL-CCNC: 15 U/L (ref 13–56)
ANION GAP SERPL CALC-SCNC: 4 MMOL/L (ref 3–18)
APPEARANCE UR: ABNORMAL
AST SERPL-CCNC: 21 U/L (ref 10–38)
AST SERPL-CCNC: 27 IU/L (ref 0–40)
ATRIAL RATE: 70 BPM
BACTERIA SPEC CULT: NORMAL
BACTERIA URNS QL MICRO: ABNORMAL /HPF
BASOPHILS # BLD AUTO: 0 X10E3/UL (ref 0–0.2)
BASOPHILS # BLD: 0 K/UL (ref 0–0.1)
BASOPHILS NFR BLD AUTO: 1 %
BASOPHILS NFR BLD: 1 % (ref 0–2)
BILIRUB SERPL-MCNC: 0.3 MG/DL (ref 0–1.2)
BILIRUB SERPL-MCNC: 0.4 MG/DL (ref 0.2–1)
BILIRUB UR QL: ABNORMAL
BUN SERPL-MCNC: 11 MG/DL (ref 7–18)
BUN SERPL-MCNC: 7 MG/DL (ref 10–36)
BUN/CREAT SERPL: 10 (ref 12–20)
BUN/CREAT SERPL: 8 (ref 12–28)
CALCIUM SERPL-MCNC: 8.5 MG/DL (ref 8.5–10.1)
CALCIUM SERPL-MCNC: 8.7 MG/DL (ref 8.7–10.3)
CALCULATED P AXIS, ECG09: 109 DEGREES
CALCULATED R AXIS, ECG10: -54 DEGREES
CALCULATED T AXIS, ECG11: 127 DEGREES
CHLORIDE SERPL-SCNC: 103 MMOL/L (ref 96–106)
CHLORIDE SERPL-SCNC: 109 MMOL/L (ref 100–111)
CHOLEST SERPL-MCNC: 220 MG/DL (ref 100–199)
CK MB CFR SERPL CALC: 3.2 % (ref 0–4)
CK MB CFR SERPL CALC: 3.2 % (ref 0–4)
CK MB SERPL-MCNC: 2.4 NG/ML (ref 5–25)
CK MB SERPL-MCNC: 2.4 NG/ML (ref 5–25)
CK SERPL-CCNC: 74 U/L (ref 26–192)
CK SERPL-CCNC: 75 U/L (ref 26–192)
CO2 SERPL-SCNC: 21 MMOL/L (ref 20–29)
CO2 SERPL-SCNC: 31 MMOL/L (ref 21–32)
COLOR UR: ABNORMAL
CREAT SERPL-MCNC: 0.84 MG/DL (ref 0.57–1)
CREAT SERPL-MCNC: 1.12 MG/DL (ref 0.6–1.3)
DIAGNOSIS, 93000: NORMAL
DIFFERENTIAL METHOD BLD: ABNORMAL
EOSINOPHIL # BLD AUTO: 0.1 X10E3/UL (ref 0–0.4)
EOSINOPHIL # BLD: 0.1 K/UL (ref 0–0.4)
EOSINOPHIL NFR BLD AUTO: 3 %
EOSINOPHIL NFR BLD: 2 % (ref 0–5)
EPITH CASTS URNS QL MICRO: ABNORMAL /LPF (ref 0–5)
ERYTHROCYTE [DISTWIDTH] IN BLOOD BY AUTOMATED COUNT: 13.9 % (ref 12.3–15.4)
ERYTHROCYTE [DISTWIDTH] IN BLOOD BY AUTOMATED COUNT: 14.3 % (ref 11.6–14.5)
GLOBULIN SER CALC-MCNC: 3.4 G/DL (ref 1.5–4.5)
GLOBULIN SER CALC-MCNC: 3.8 G/DL (ref 2–4)
GLUCOSE SERPL-MCNC: 133 MG/DL (ref 74–99)
GLUCOSE SERPL-MCNC: 92 MG/DL (ref 65–99)
GLUCOSE UR STRIP.AUTO-MCNC: NEGATIVE MG/DL
HCT VFR BLD AUTO: 32.6 % (ref 35–45)
HCT VFR BLD AUTO: 34 % (ref 34–46.6)
HDLC SERPL-MCNC: 66 MG/DL
HGB BLD-MCNC: 10.3 G/DL (ref 12–16)
HGB BLD-MCNC: 11 G/DL (ref 11.1–15.9)
HGB UR QL STRIP: ABNORMAL
IMM GRANULOCYTES # BLD AUTO: 0 X10E3/UL (ref 0–0.1)
IMM GRANULOCYTES NFR BLD AUTO: 0 %
INR PPP: 1.1 (ref 0.8–1.2)
KETONES UR QL STRIP.AUTO: NEGATIVE MG/DL
LDLC SERPL CALC-MCNC: 118 MG/DL (ref 0–99)
LEUKOCYTE ESTERASE UR QL STRIP.AUTO: ABNORMAL
LYMPHOCYTES # BLD AUTO: 1 X10E3/UL (ref 0.7–3.1)
LYMPHOCYTES # BLD: 0.9 K/UL (ref 0.9–3.6)
LYMPHOCYTES NFR BLD AUTO: 27 %
LYMPHOCYTES NFR BLD: 25 % (ref 21–52)
MCH RBC QN AUTO: 29.5 PG (ref 24–34)
MCH RBC QN AUTO: 29.6 PG (ref 26.6–33)
MCHC RBC AUTO-ENTMCNC: 31.6 G/DL (ref 31–37)
MCHC RBC AUTO-ENTMCNC: 32.4 G/DL (ref 31.5–35.7)
MCV RBC AUTO: 92 FL (ref 79–97)
MCV RBC AUTO: 93.4 FL (ref 74–97)
MONOCYTES # BLD AUTO: 0.4 X10E3/UL (ref 0.1–0.9)
MONOCYTES # BLD: 0 K/UL (ref 0.05–1.2)
MONOCYTES NFR BLD AUTO: 10 %
MONOCYTES NFR BLD: 1 % (ref 3–10)
MORPHOLOGY BLD-IMP: ABNORMAL
MUCOUS THREADS URNS QL MICRO: ABNORMAL /LPF
NEUTROPHILS # BLD AUTO: 2.2 X10E3/UL (ref 1.4–7)
NEUTROPHILS NFR BLD AUTO: 59 %
NEUTS SEG # BLD: 2.7 K/UL (ref 1.8–8)
NEUTS SEG NFR BLD: 71 % (ref 40–73)
NITRITE UR QL STRIP.AUTO: NEGATIVE
P-R INTERVAL, ECG05: 192 MS
PH UR STRIP: 5.5 [PH] (ref 5–8)
PLATELET # BLD AUTO: 42 K/UL (ref 135–420)
PLATELET # BLD AUTO: 55 X10E3/UL (ref 150–450)
PLATELET COMMENTS,PCOM: ABNORMAL
PMV BLD AUTO: 12.1 FL (ref 9.2–11.8)
POTASSIUM SERPL-SCNC: 3.3 MMOL/L (ref 3.5–5.5)
POTASSIUM SERPL-SCNC: 3.9 MMOL/L (ref 3.5–5.2)
PROT SERPL-MCNC: 7.2 G/DL (ref 6.4–8.2)
PROT SERPL-MCNC: 7.6 G/DL (ref 6–8.5)
PROT UR STRIP-MCNC: 30 MG/DL
PROTHROMBIN TIME: 14 SEC (ref 11.5–15.2)
Q-T INTERVAL, ECG07: 478 MS
QRS DURATION, ECG06: 158 MS
QTC CALCULATION (BEZET), ECG08: 516 MS
RBC # BLD AUTO: 3.49 M/UL (ref 4.2–5.3)
RBC # BLD AUTO: 3.71 X10E6/UL (ref 3.77–5.28)
RBC #/AREA URNS HPF: ABNORMAL /HPF (ref 0–5)
RBC MORPH BLD: ABNORMAL
SERVICE CMNT-IMP: NORMAL
SODIUM SERPL-SCNC: 141 MMOL/L (ref 134–144)
SODIUM SERPL-SCNC: 144 MMOL/L (ref 136–145)
SP GR UR REFRACTOMETRY: 1.01 (ref 1–1.03)
TRIGL SERPL-MCNC: 181 MG/DL (ref 0–149)
TROPONIN I SERPL-MCNC: 0.02 NG/ML (ref 0–0.04)
TROPONIN I SERPL-MCNC: <0.02 NG/ML (ref 0–0.04)
URATE CRY URNS QL MICRO: ABNORMAL
UROBILINOGEN UR QL STRIP.AUTO: 1 EU/DL (ref 0.2–1)
VENTRICULAR RATE, ECG03: 70 BPM
VLDLC SERPL CALC-MCNC: 36 MG/DL (ref 5–40)
WBC # BLD AUTO: 3.7 K/UL (ref 4.6–13.2)
WBC # BLD AUTO: 3.8 X10E3/UL (ref 3.4–10.8)
WBC URNS QL MICRO: ABNORMAL /HPF (ref 0–4)

## 2019-01-01 PROCEDURE — 85025 COMPLETE CBC W/AUTO DIFF WBC: CPT

## 2019-01-01 PROCEDURE — 82550 ASSAY OF CK (CPK): CPT

## 2019-01-01 PROCEDURE — 74011250637 HC RX REV CODE- 250/637: Performed by: EMERGENCY MEDICINE

## 2019-01-01 PROCEDURE — 71045 X-RAY EXAM CHEST 1 VIEW: CPT

## 2019-01-01 PROCEDURE — 81001 URINALYSIS AUTO W/SCOPE: CPT

## 2019-01-01 PROCEDURE — 99285 EMERGENCY DEPT VISIT HI MDM: CPT

## 2019-01-01 PROCEDURE — 93005 ELECTROCARDIOGRAM TRACING: CPT

## 2019-01-01 PROCEDURE — 85610 PROTHROMBIN TIME: CPT

## 2019-01-01 PROCEDURE — 87086 URINE CULTURE/COLONY COUNT: CPT

## 2019-01-01 PROCEDURE — 80053 COMPREHEN METABOLIC PANEL: CPT

## 2019-01-01 PROCEDURE — 70450 CT HEAD/BRAIN W/O DYE: CPT

## 2019-01-01 RX ORDER — METOPROLOL TARTRATE 50 MG/1
TABLET ORAL
Qty: 90 TAB | Refills: 1 | Status: SHIPPED | OUTPATIENT
Start: 2019-01-01 | End: 2020-01-01 | Stop reason: SDUPTHER

## 2019-01-01 RX ORDER — CIPROFLOXACIN 500 MG/1
500 TABLET ORAL 2 TIMES DAILY
Qty: 20 TAB | Refills: 0 | Status: SHIPPED | OUTPATIENT
Start: 2019-01-01 | End: 2019-01-01

## 2019-01-01 RX ORDER — CIPROFLOXACIN 500 MG/1
500 TABLET ORAL 2 TIMES DAILY
Qty: 6 TAB | Refills: 0 | Status: SHIPPED | OUTPATIENT
Start: 2019-01-01 | End: 2019-01-01

## 2019-01-01 RX ORDER — PRAVASTATIN SODIUM 10 MG/1
TABLET ORAL
Qty: 90 TAB | Refills: 1 | Status: SHIPPED | OUTPATIENT
Start: 2019-01-01 | End: 2020-01-01

## 2019-01-01 RX ORDER — FUROSEMIDE 20 MG/1
TABLET ORAL
Qty: 90 TAB | Refills: 1 | Status: SHIPPED | OUTPATIENT
Start: 2019-01-01 | End: 2020-01-01

## 2019-01-01 RX ORDER — PANTOPRAZOLE SODIUM 40 MG/1
40 TABLET, DELAYED RELEASE ORAL DAILY
Qty: 90 TAB | Refills: 1 | Status: SHIPPED | OUTPATIENT
Start: 2019-01-01 | End: 2020-01-01

## 2019-01-01 RX ORDER — LOSARTAN POTASSIUM 50 MG/1
TABLET ORAL
Qty: 180 TAB | Refills: 1 | Status: ON HOLD | OUTPATIENT
Start: 2019-01-01 | End: 2020-01-01 | Stop reason: SDUPTHER

## 2019-01-01 RX ORDER — POTASSIUM CHLORIDE 20 MEQ/1
40 TABLET, EXTENDED RELEASE ORAL
Status: COMPLETED | OUTPATIENT
Start: 2019-01-01 | End: 2019-01-01

## 2019-01-01 RX ORDER — GRANULES FOR ORAL 3 G/1
3 POWDER ORAL ONCE
Status: COMPLETED | OUTPATIENT
Start: 2019-01-01 | End: 2019-01-01

## 2019-01-01 RX ADMIN — POTASSIUM CHLORIDE 40 MEQ: 1500 TABLET, EXTENDED RELEASE ORAL at 14:02

## 2019-01-01 RX ADMIN — FOSFOMYCIN TROMETHAMINE 3 G: 3 POWDER ORAL at 14:05

## 2019-02-09 ENCOUNTER — HOSPITAL ENCOUNTER (EMERGENCY)
Age: 84
Discharge: HOME | End: 2019-02-09
Attending: EMERGENCY MEDICINE
Payer: MEDICARE

## 2019-02-09 VITALS
BODY MASS INDEX: 26.43 KG/M2 | TEMPERATURE: 99.4 F | WEIGHT: 140 LBS | HEART RATE: 89 BPM | RESPIRATION RATE: 20 BRPM | HEIGHT: 61 IN | DIASTOLIC BLOOD PRESSURE: 83 MMHG | OXYGEN SATURATION: 100 % | SYSTOLIC BLOOD PRESSURE: 161 MMHG

## 2019-02-09 DIAGNOSIS — D50.8 IRON DEFICIENCY ANEMIA DUE TO DIETARY CAUSES: ICD-10-CM

## 2019-02-09 DIAGNOSIS — L85.3 DRY SKIN: ICD-10-CM

## 2019-02-09 DIAGNOSIS — L03.119 CELLULITIS OF FOOT: Primary | ICD-10-CM

## 2019-02-09 LAB
ALBUMIN SERPL-MCNC: 3.7 G/DL (ref 3.4–5)
ALBUMIN/GLOB SERPL: 0.8 {RATIO} (ref 0.8–1.7)
ALP SERPL-CCNC: 55 U/L (ref 45–117)
ALT SERPL-CCNC: 14 U/L (ref 13–56)
ANION GAP SERPL CALC-SCNC: 7 MMOL/L (ref 3–18)
APPEARANCE UR: CLEAR
AST SERPL-CCNC: 19 U/L (ref 15–37)
BASOPHILS # BLD: 0 K/UL (ref 0–0.1)
BASOPHILS NFR BLD: 1 % (ref 0–2)
BILIRUB SERPL-MCNC: 0.5 MG/DL (ref 0.2–1)
BILIRUB UR QL: NEGATIVE
BUN SERPL-MCNC: 10 MG/DL (ref 7–18)
BUN/CREAT SERPL: 12 (ref 12–20)
CALCIUM SERPL-MCNC: 8.7 MG/DL (ref 8.5–10.1)
CHLORIDE SERPL-SCNC: 108 MMOL/L (ref 100–108)
CO2 SERPL-SCNC: 28 MMOL/L (ref 21–32)
COLOR UR: YELLOW
CREAT SERPL-MCNC: 0.81 MG/DL (ref 0.6–1.3)
DIFFERENTIAL METHOD BLD: ABNORMAL
EOSINOPHIL # BLD: 0.1 K/UL (ref 0–0.4)
EOSINOPHIL NFR BLD: 2 % (ref 0–5)
EPITH CASTS URNS QL MICRO: NORMAL /LPF (ref 0–5)
ERYTHROCYTE [DISTWIDTH] IN BLOOD BY AUTOMATED COUNT: 14.1 % (ref 11.6–14.5)
GLOBULIN SER CALC-MCNC: 4.4 G/DL (ref 2–4)
GLUCOSE SERPL-MCNC: 80 MG/DL (ref 74–99)
GLUCOSE UR STRIP.AUTO-MCNC: NEGATIVE MG/DL
HCT VFR BLD AUTO: 35.7 % (ref 35–45)
HGB BLD-MCNC: 11.2 G/DL (ref 12–16)
HGB UR QL STRIP: ABNORMAL
KETONES UR QL STRIP.AUTO: NEGATIVE MG/DL
LEUKOCYTE ESTERASE UR QL STRIP.AUTO: ABNORMAL
LYMPHOCYTES # BLD: 0.7 K/UL (ref 0.9–3.6)
LYMPHOCYTES NFR BLD: 21 % (ref 21–52)
MCH RBC QN AUTO: 30.1 PG (ref 24–34)
MCHC RBC AUTO-ENTMCNC: 31.4 G/DL (ref 31–37)
MCV RBC AUTO: 96 FL (ref 74–97)
MONOCYTES # BLD: 0.4 K/UL (ref 0.05–1.2)
MONOCYTES NFR BLD: 10 % (ref 3–10)
NEUTS SEG # BLD: 2.3 K/UL (ref 1.8–8)
NEUTS SEG NFR BLD: 66 % (ref 40–73)
NITRITE UR QL STRIP.AUTO: NEGATIVE
PH UR STRIP: 6.5 [PH] (ref 5–8)
PLATELET # BLD AUTO: 44 K/UL (ref 135–420)
PMV BLD AUTO: 11.5 FL (ref 9.2–11.8)
POTASSIUM SERPL-SCNC: 3.9 MMOL/L (ref 3.5–5.5)
PROT SERPL-MCNC: 8.1 G/DL (ref 6.4–8.2)
PROT UR STRIP-MCNC: NEGATIVE MG/DL
RBC # BLD AUTO: 3.72 M/UL (ref 4.2–5.3)
RBC #/AREA URNS HPF: NORMAL /HPF (ref 0–5)
SODIUM SERPL-SCNC: 143 MMOL/L (ref 136–145)
SP GR UR REFRACTOMETRY: 1.02 (ref 1–1.03)
UROBILINOGEN UR QL STRIP.AUTO: 1 EU/DL (ref 0.2–1)
WBC # BLD AUTO: 3.5 K/UL (ref 4.6–13.2)
WBC URNS QL MICRO: NORMAL /HPF (ref 0–4)

## 2019-02-09 PROCEDURE — 80053 COMPREHEN METABOLIC PANEL: CPT

## 2019-02-09 PROCEDURE — 81001 URINALYSIS AUTO W/SCOPE: CPT

## 2019-02-09 PROCEDURE — 99282 EMERGENCY DEPT VISIT SF MDM: CPT

## 2019-02-09 PROCEDURE — 85025 COMPLETE CBC W/AUTO DIFF WBC: CPT

## 2019-02-09 RX ORDER — SULFAMETHOXAZOLE AND TRIMETHOPRIM 800; 160 MG/1; MG/1
1 TABLET ORAL 2 TIMES DAILY
Qty: 14 TAB | Refills: 0 | Status: SHIPPED | OUTPATIENT
Start: 2019-02-09 | End: 2019-02-16

## 2019-02-09 RX ORDER — FERROUS SULFATE 325(65) MG
TABLET ORAL
Qty: 180 TAB | Refills: 0 | Status: SHIPPED | OUTPATIENT
Start: 2019-02-09 | End: 2020-01-01

## 2019-02-09 NOTE — ED NOTES
Patient armband removed and shredded Pt discharged home with after care instructions given to daughter . Pt verbalizes understand of after care instructions. Return to the ED for any worsening symptoms and follow with primary care doctor as directed  Parviz Browning RN

## 2019-02-09 NOTE — ED TRIAGE NOTES
Daughter states was washing pt's feet Tuesday and noticed a blister on one of the toes on her left foot. Today noticed that her toes on that foot were black. Pt also c/o itching all over

## 2019-02-09 NOTE — ED PROVIDER NOTES
EMERGENCY DEPARTMENT HISTORY AND PHYSICAL EXAM 
 
3:19 PM 
 
 
Date: 2/9/2019 Patient Name: Maryan Bryant History of Presenting Illness Chief Complaint Patient presents with  Foot Problem  Skin Problem History Provided By: Patient Chief Complaint: foot rash, hand rash, dry skin Duration: 4 Days Timing:  Acute Location: left toes Quality: Aching and itching Severity: Mild Modifying Factors: worse with scratching Associated Symptoms: denies any other associated signs or symptoms Additional History (Context):Vani Rose is a 80 y.o. female with a pertinent history of HTN, GERD, PE, HLD, Afib, CVA who presents to the emergency department for evaluation of itchy bilateral hands and bilateral feet. Pt has been scratching at the hands and feet. Her daughter states she has noticed that the toes are bruised now. Pt denies any fevers or chills, headache, dizziness or light headedness, ENT issues, CP or discomfort, SOB, cough, n/v/d/c, abd pain, back pain, diaphoresis, melena/hematochezia, dysuria, hematuria, frequency, focal weakness/numbness/tingling. Patient has no other complaints at this time. PCP:  Miranda Hernandez NP Current Outpatient Medications Medication Sig Dispense Refill  trimethoprim-sulfamethoxazole (BACTRIM DS) 160-800 mg per tablet Take 1 Tab by mouth two (2) times a day for 7 days. 14 Tab 0  
 metoprolol tartrate (LOPRESSOR) 50 mg tablet TAKE ONE TABLET BY MOUTH ONCE DAILY 90 Tab 1  pravastatin (PRAVACHOL) 10 mg tablet TAKE ONE TABLET BY MOUTH ONCE NIGHTLY 90 Tab 0  
 pantoprazole (PROTONIX) 40 mg tablet Take 1 Tab by mouth daily. 90 Tab 1  
 amLODIPine (NORVASC) 5 mg tablet Take 5 mg by mouth daily.     
 IRON, FERROUS SULFATE, 325 mg (65 mg iron) tablet TAKE 1 TABLET BY MOUTH ONCE DAILY BEFORE  BREAKFAST 180 Tab 0  
 furosemide (LASIX) 20 mg tablet TAKE 1 TABLET BY MOUTH ONCE DAILY 30 Tab 1  
  clotrimazole-betamethasone (LOTRISONE) topical cream Apply  to affected area two (2) times a day.  losartan (COZAAR) 50 mg tablet Take 1 Tab by mouth two (2) times a day. 180 Tab 1  
 betamethasone dipropionate (DIPROSONE) 0.05 % topical cream Use twice a day. 60 g 5  cyanocobalamin 1,000 mcg tablet Take 1 Tab by mouth daily. 30 Tab 1  polyethylene glycol (MIRALAX) 17 gram/dose powder Take 17 g by mouth daily. 527 g 1  
 senna-docusate (PERICOLACE) 8.6-50 mg per tablet Take 1 tablet by mouth daily. 30 tablet 0 Past History Past Medical History: 
Past Medical History:  
Diagnosis Date  Anemia 1/11/2010  
 thrombocytopenia  Anxiety 1/11/2010  Atrial fibrillation ([address] 1/11/2010  Atrial flutter ([address] recurrent  CAD (coronary artery disease) 8/3/2010  Cataract 1/11/2010  Coronary atherosclerosis of native coronary artery  CVA (cerebral infarction)  Depression 3/18/2014  Diverticulitis 11/19/2014  DVT (deep venous thrombosis) ([address] 1/1/2007  Dysarthria  Essential hypertension, benign  GERD (gastroesophageal reflux disease) 1/11/2010  
 History of blood transfusion 2/16/2016  HLD (hyperlipidemia) 1/11/2010  
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 
Past Surgical History:  
Procedure Laterality Date  CARDIAC SURG PROCEDURE UNLIST    
 ablation  HX CATARACT REMOVAL    
 bilateral  
 HX CHOLECYSTECTOMY  HX OTHER SURGICAL    
 IVC filter  HX PACEMAKER    
  IR IVC FILTER PLACEMENT PERCUTANEOUS  2007 Family History: 
Family History Problem Relation Age of Onset 24 [facility] Arthritis-osteo Mother  Hypertension Mother  Arthritis-osteo Father  Coronary Artery Disease Neg Hx Social History: 
Social History Tobacco Use  Smoking status: Never Smoker  Smokeless tobacco: Never Used Substance Use Topics  Alcohol use: No  
  Alcohol/week: 0.0 oz  Drug use: No  
 
 
Allergies: Allergies Allergen Reactions  Pcn [Penicillins] Hives Review of Systems Review of Systems Constitutional: Negative for chills and fever. HENT: Negative for congestion, rhinorrhea and sore throat. Respiratory: Negative for cough and shortness of breath. Cardiovascular: Negative for chest pain. Gastrointestinal: Negative for abdominal pain, blood in stool, constipation, diarrhea, nausea and vomiting. Genitourinary: Negative for dysuria, frequency and hematuria. Musculoskeletal: Positive for arthralgias. Negative for back pain and myalgias. Skin: Positive for rash. Negative for wound. Neurological: Negative for dizziness and headaches. Physical Exam  
 
Visit Vitals /83 (BP 1 Location: Left arm) Pulse 89 Temp 99.4 °F (37.4 °C) Resp 20 Ht 5' 1\" (1.549 m) Wt 63.5 kg (140 lb) SpO2 100% BMI 26.45 kg/m² Physical Exam  
Constitutional: She is oriented to person, place, and time. She appears well-developed and well-nourished. No distress. HENT:  
Head: Normocephalic and atraumatic. Eyes: Conjunctivae are normal.  
Neck: Normal range of motion. Neck supple. Cardiovascular: Normal rate, regular rhythm and normal heart sounds. Pulmonary/Chest: Effort normal and breath sounds normal. No respiratory distress. She has no wheezes. She has no rales. She exhibits no tenderness. Musculoskeletal: She exhibits no edema, tenderness or deformity. Neurological: She is alert and oriented to person, place, and time. She has normal reflexes. Skin: Skin is warm and dry. Rash noted. She is not diaphoretic. Dry, excoriated, lichenified skin noted to bilateral palms and left dorsal distal foot. Breaks in skin noted with serous drainage. Psychiatric: She has a normal mood and affect. Nursing note and vitals reviewed. Diagnostic Study Results Labs - Recent Results (from the past 12 hour(s)) URINALYSIS W/ RFLX MICROSCOPIC Collection Time: 02/09/19 12:03 PM  
Result Value Ref Range Color YELLOW Appearance CLEAR Specific gravity 1.018 1.005 - 1.030    
 pH (UA) 6.5 5.0 - 8.0 Protein NEGATIVE  NEG mg/dL Glucose NEGATIVE  NEG mg/dL Ketone NEGATIVE  NEG mg/dL Bilirubin NEGATIVE  NEG Blood TRACE (A) NEG Urobilinogen 1.0 0.2 - 1.0 EU/dL Nitrites NEGATIVE  NEG Leukocyte Esterase SMALL (A) NEG URINE MICROSCOPIC ONLY Collection Time: 02/09/19 12:03 PM  
Result Value Ref Range WBC 0 to 3 0 - 4 /hpf  
 RBC 0 to 3 0 - 5 /hpf Epithelial cells 2+ 0 - 5 /lpf  
CBC WITH AUTOMATED DIFF Collection Time: 02/09/19 12:32 PM  
Result Value Ref Range WBC 3.5 (L) 4.6 - 13.2 K/uL  
 RBC 3.72 (L) 4.20 - 5.30 M/uL  
 HGB 11.2 (L) 12.0 - 16.0 g/dL HCT 35.7 35.0 - 45.0 % MCV 96.0 74.0 - 97.0 FL  
 MCH 30.1 24.0 - 34.0 PG  
 MCHC 31.4 31.0 - 37.0 g/dL  
 RDW 14.1 11.6 - 14.5 % PLATELET 44 (L) 440 - 420 K/uL MPV 11.5 9.2 - 11.8 FL  
 NEUTROPHILS 66 40 - 73 % LYMPHOCYTES 21 21 - 52 % MONOCYTES 10 3 - 10 % EOSINOPHILS 2 0 - 5 % BASOPHILS 1 0 - 2 %  
 ABS. NEUTROPHILS 2.3 1.8 - 8.0 K/UL  
 ABS. LYMPHOCYTES 0.7 (L) 0.9 - 3.6 K/UL  
 ABS. MONOCYTES 0.4 0.05 - 1.2 K/UL  
 ABS. EOSINOPHILS 0.1 0.0 - 0.4 K/UL  
 ABS. BASOPHILS 0.0 0.0 - 0.1 K/UL  
 DF AUTOMATED METABOLIC PANEL, COMPREHENSIVE Collection Time: 02/09/19 12:32 PM  
Result Value Ref Range  Sodium 143 136 - 145 mmol/L  
 Potassium 3.9 3.5 - 5.5 mmol/L Chloride 108 100 - 108 mmol/L  
 CO2 28 21 - 32 mmol/L Anion gap 7 3.0 - 18 mmol/L Glucose 80 74 - 99 mg/dL BUN 10 7.0 - 18 MG/DL Creatinine 0.81 0.6 - 1.3 MG/DL  
 BUN/Creatinine ratio 12 12 - 20 GFR est AA >60 >60 ml/min/1.73m2 GFR est non-AA >60 >60 ml/min/1.73m2 Calcium 8.7 8.5 - 10.1 MG/DL Bilirubin, total 0.5 0.2 - 1.0 MG/DL  
 ALT (SGPT) 14 13 - 56 U/L  
 AST (SGOT) 19 15 - 37 U/L Alk. phosphatase 55 45 - 117 U/L Protein, total 8.1 6.4 - 8.2 g/dL Albumin 3.7 3.4 - 5.0 g/dL Globulin 4.4 (H) 2.0 - 4.0 g/dL A-G Ratio 0.8 0.8 - 1.7 Radiologic Studies - No results found. Medical Decision Making I am the first provider for this patient. I reviewed the vital signs, available nursing notes, past medical history, past surgical history, family history and social history. Vital Signs-Reviewed the patient's vital signs. Pulse Oximetry Analysis -  100% on room air (Interpretation) Records Reviewed: Nursing Notes and Old Medical Records (Time of Review: 3:19 PM) ED Course: Progress Notes, Reevaluation, and Consults: 
 
Provider Notes (Medical Decision Making):  
differential diagnosis:  Cellulitis, tinea, self-inflicted injury, dry skin, biliary dysfunction Plan:  Pt presents in NAD, vitals wnl. Exam and HPI c/w dry skin with extensive excoriations. Breaks in skin noted to toes with small amount of erythema. Cap refill < 2 sec. Intact distal sensation. Exam is not c/w scabies or tinea. Will DC home with bactrim. At this time, patient is stable and appropriate for discharge home. Patient demonstrates understanding of current diagnoses and is in agreement with the treatment plan. They are advised that while the likelihood of serious underlying condition is low at this point given the evaluation performed today, we cannot fully rule it out.   They are advised to immediately return with any new symptoms or worsening of current condition. All questions have been answered. Patient is given educational material regarding their diagnoses, including danger symptoms and when to return to the ED. Follow-up with PCP. Diagnosis Clinical Impression: 1. Cellulitis of foot 2. Dry skin Disposition: [address] Follow-up Information Follow up With Specialties Details Why Contact Info Alexx Lizama NP Nurse Practitioner Call in 2 days For follow-up [address] [facility] [address] [facility] [id number] [phone] [address] [facility] EMERGENCY DEPT Emergency Medicine Go to As needed, If symptoms worsen Ricardo Sandoval [id number] [phone] Medication List  
  
START taking these medications   
trimethoprim-sulfamethoxazole 160-800 mg per tablet Commonly known as:  BACTRIM DS Take 1 Tab by mouth two (2) times a day for 7 days. CONTINUE taking these medications   
amLODIPine 5 mg tablet Commonly known as:  NORVASC 
  
betamethasone dipropionate 0.05 % topical cream 
Commonly known as:  Nish Faustman Use twice a day. cyanocobalamin 1,000 mcg tablet Take 1 Tab by mouth daily. furosemide 20 mg tablet Commonly known as:  LASIX TAKE 1 TABLET BY MOUTH ONCE DAILY losartan 50 mg tablet Commonly known as:  COZAAR Take 1 Tab by mouth two (2) times a day. LOTRISONE topical cream 
Generic drug:  clotrimazole-betamethasone 
  
metoprolol tartrate 50 mg tablet Commonly known as:  LOPRESSOR 
TAKE ONE TABLET BY MOUTH ONCE DAILY pantoprazole 40 mg tablet Commonly known as:  PROTONIX Take 1 Tab by mouth daily. polyethylene glycol 17 gram/dose powder Commonly known as:  Ferol Prateek Take 17 g by mouth daily. pravastatin 10 mg tablet Commonly known as:  PRAVACHOL 
TAKE ONE TABLET BY MOUTH ONCE NIGHTLY 
  
senna-docusate 8.6-50 mg per tablet Commonly known as:  Carlos Roxana Take 1 tablet by mouth daily. ASK your doctor about these medications Iron (Ferrous Sulfate) 325 mg (65 mg iron) tablet Generic drug:  ferrous sulfate TAKE 1 TABLET BY MOUTH ONCE DAILY BEFORE  BREAKFAST Ask about: Which instructions should I use? Where to Get Your Medications These medications were sent to [address] [place] [address], [address] [place] [address]  [address] [address] Phone:  [phone] · Iron (Ferrous Sulfate) 325 mg (65 mg iron) tablet Information about where to get these medications is not yet available Ask your nurse or doctor about these medications · trimethoprim-sulfamethoxazole 160-800 mg per tablet 
  
 
_______________________________

## 2019-02-09 NOTE — DISCHARGE INSTRUCTIONS
Patient Education      Please return immediately to the Emergency Room for re-evaluation if you are not improving, develop any new symptoms, or develop worsening of current symptoms! If you have been prescribed a medication and are unable to take this medication for any reason, please return to the Emergency Department for further evaluation! If you have been referred for follow-up to a specialist, but are unable to follow-up and your symptoms are either not improving or are worsening, please return to the Emergency Department for further evaluation! Cellulitis: Care Instructions  Your Care Instructions    Cellulitis is a skin infection caused by bacteria, most often strep or staph. It often occurs after a break in the skin from a scrape, cut, bite, or puncture, or after a rash. Cellulitis may be treated without doing tests to find out what caused it. But your doctor may do tests, if needed, to look for a specific bacteria, like methicillin-resistant Staphylococcus aureus (MRSA). The doctor has checked you carefully, but problems can develop later. If you notice any problems or new symptoms, get medical treatment right away. Follow-up care is a key part of your treatment and safety. Be sure to make and go to all appointments, and call your doctor if you are having problems. It's also a good idea to know your test results and keep a list of the medicines you take. How can you care for yourself at home? · Take your antibiotics as directed. Do not stop taking them just because you feel better. You need to take the full course of antibiotics. · Prop up the infected area on pillows to reduce pain and swelling. Try to keep the area above the level of your heart as often as you can. · If your doctor told you how to care for your wound, follow your doctor's instructions. If you did not get instructions, follow this general advice:  ? Wash the wound with clean water 2 times a day.  Don't use hydrogen peroxide or alcohol, which can slow healing. ? You may cover the wound with a thin layer of petroleum jelly, such as Vaseline, and a nonstick bandage. ? Apply more petroleum jelly and replace the bandage as needed. · Be safe with medicines. Take pain medicines exactly as directed. ? If the doctor gave you a prescription medicine for pain, take it as prescribed. ? If you are not taking a prescription pain medicine, ask your doctor if you can take an over-the-counter medicine. To prevent cellulitis in the future  · Try to prevent cuts, scrapes, or other injuries to your skin. Cellulitis most often occurs where there is a break in the skin. · If you get a scrape, cut, mild burn, or bite, wash the wound with clean water as soon as you can to help avoid infection. Don't use hydrogen peroxide or alcohol, which can slow healing. · If you have swelling in your legs (edema), support stockings and good skin care may help prevent leg sores and cellulitis. · Take care of your feet, especially if you have diabetes or other conditions that increase the risk of infection. Wear shoes and socks. Do not go barefoot. If you have athlete's foot or other skin problems on your feet, talk to your doctor about how to treat them. When should you call for help? Call your doctor now or seek immediate medical care if:    · You have signs that your infection is getting worse, such as:  ? Increased pain, swelling, warmth, or redness. ? Red streaks leading from the area. ? Pus draining from the area. ? A fever.     · You get a rash.    Watch closely for changes in your health, and be sure to contact your doctor if:    · You do not get better as expected. Where can you learn more? Go to http://cachorro-itzel.info/. Jayleen Simmons in the search box to learn more about \"Cellulitis: Care Instructions. \"  Current as of: April 17, 2018  Content Version: 11.9  © 6277-9474 [employer].  Care instructions adapted under license by [employer] (which disclaims liability or warranty for this information). If you have questions about a medical condition or this instruction, always ask your healthcare professional. [facility] any warranty or liability for your use of this information. Patient Education        Dry Skin: Care Instructions  Your Care Instructions  Dry skin is a common problem, especially in areas where the air is very dry. Dry skin can also become a problem as you get older and lose natural oils that keep your skin moist.  A tendency toward dry, itchy skin may run in families. Some problems with the body's defenses (immune system), allergies, or an infection with a fungus may also cause patches of dry skin. An over-the-counter cream may help your dry skin. If your skin problem does not get better with home treatment, your doctor may prescribe ointment. You may need antibiotics if you have a skin infection. Follow-up care is a key part of your treatment and safety. Be sure to make and go to all appointments, and call your doctor if you are having problems. It's also a good idea to know your test results and keep a list of the medicines you take. How can you care for yourself at home? Showers and baths  · Keep showers and baths short, and use warm or lukewarm water. Don't use hot water. It takes off more of your skin's natural oils. · Use as little soap as you can. Choose a mild soap, such as Dove, Cetaphil, or Neutrogena. Or use a skin cleanser like Aquanil or Cetaphil. · If you are taking a bath, use soap only at the very end. Then rinse off all traces of soap with fresh water. Gently pat your skin dry with a towel. Skin creams and moisturizers  · Apply moisturizer or skin cream right away (within 3 minutes) after a bath or shower. Use a moisturizer at other times too, as often as you need it. · Moisturizing creams are better than lotions.  Try brands like CeraVe cream, Cetaphil cream, or Eucerin cream.  Other tips  · When washing clothes, use a small amount of detergent. Don't use fabric softeners or dryer sheets. · For small areas of itchy skin, try an over-the-counter 1% hydrocortisone cream.  · If you have very dry hands, spread petroleum jelly (such as Vaseline) on your hands before bed. Wear thin cotton gloves while you sleep. If your feet are dry, spread Vaseline on them and wear socks while you sleep. When should you call for help? Call your doctor now or seek immediate medical care if:    · You have signs of infection, such as:  ? Pain, warmth, or swelling in the skin. ? Red streaks near a wound in the skin. ? Pus coming from a wound in your skin. ? A fever.    Watch closely for changes in your health, and be sure to contact your doctor if:    · You do not get better as expected. Where can you learn more? Go to http://cachorro-itzel.info/. Enter T881 in the search box to learn more about \"Dry Skin: Care Instructions. \"  Current as of: April 17, 2018  Content Version: 11.9  © 1691-1230 [employer]. Care instructions adapted under license by [employer] (which disclaims liability or warranty for this information). If you have questions about a medical condition or this instruction, always ask your healthcare professional. [address] any warranty or liability for your use of this information.

## 2019-02-21 ENCOUNTER — OFFICE VISIT (OUTPATIENT)
Dept: FAMILY MEDICINE CLINIC | Age: 84
End: 2019-02-21

## 2019-02-21 VITALS
TEMPERATURE: 98.2 F | BODY MASS INDEX: 26.06 KG/M2 | RESPIRATION RATE: 17 BRPM | OXYGEN SATURATION: 98 % | HEART RATE: 78 BPM | WEIGHT: 138 LBS | DIASTOLIC BLOOD PRESSURE: 90 MMHG | HEIGHT: 61 IN | SYSTOLIC BLOOD PRESSURE: 173 MMHG

## 2019-02-21 DIAGNOSIS — L03.119 CELLULITIS AND ABSCESS OF FOOT: Primary | ICD-10-CM

## 2019-02-21 DIAGNOSIS — L02.619 CELLULITIS AND ABSCESS OF FOOT: Primary | ICD-10-CM

## 2019-02-21 DIAGNOSIS — L30.1 ECZEMA, DYSHIDROTIC: ICD-10-CM

## 2019-02-21 RX ORDER — CLOBETASOL PROPIONATE 0.5 MG/G
CREAM TOPICAL 2 TIMES DAILY
Qty: 15 G | Refills: 0 | Status: SHIPPED | OUTPATIENT
Start: 2019-02-21 | End: 2020-01-01

## 2019-02-21 RX ORDER — MUPIROCIN 20 MG/G
OINTMENT TOPICAL DAILY
Qty: 22 G | Refills: 0 | Status: SHIPPED | OUTPATIENT
Start: 2019-02-21 | End: 2020-01-01

## 2019-02-21 RX ORDER — SULFAMETHOXAZOLE AND TRIMETHOPRIM 800; 160 MG/1; MG/1
1 TABLET ORAL 2 TIMES DAILY
Qty: 20 TAB | Refills: 0 | Status: SHIPPED | OUTPATIENT
Start: 2019-02-21 | End: 2019-01-01

## 2019-02-21 NOTE — PROGRESS NOTES
Patient is in the office today for an ED follow up. Patient has cellulitis of left foot. She is currently taking bactrim ds. 1. Have you been to the ER, urgent care clinic since your last visit? Hospitalized since your last visit? No    2. Have you seen or consulted any other health care providers outside of the [address] [place] [address] since your last visit? Include any pap smears or colon screening.  No

## 2019-02-21 NOTE — PROGRESS NOTES
HISTORY OF PRESENT ILLNESS  [id number] Roman La is a 80 y.o. female. Patient presents for follow up ED visit. Patient is here with her two daughters. HPI  Pt was seen in ED on 2-9-19 for foot infection: cellulitis, she was started on Bactrim DS  They feel the toes look so much better. They were black in color and there is no more pus draining. Review of Systems   Constitutional: Negative. Eyes: Negative. Respiratory: Negative. Cardiovascular: Negative. Genitourinary: Negative. Visit Vitals  /90 (BP 1 Location: Left arm, BP Patient Position: Sitting)   Pulse 78   Temp 98.2 °F (36.8 °C) (Oral)   Resp 17   Ht 5' 1\" (1.549 m)   Wt 138 lb (62.6 kg)   SpO2 98%   BMI 26.07 kg/m²     Physical Exam   Constitutional: She appears well-developed. No distress. Cardiovascular: Normal rate, regular rhythm and normal heart sounds. Pulmonary/Chest: Effort normal and breath sounds normal. No respiratory distress. She has no wheezes. She has no rales. Musculoskeletal: She exhibits no edema. Neurological: She is alert.   skin:  Hands: dried and cracked, fissured  Left foot: top of toes surround skin hyperpigmented there are healing sores on the top of each toe except the great toe. There is no exudate, no erythema. (the daughters stated the skin is much lighter than it was)    ASSESSMENT and PLAN    ICD-10-CM ICD-9-CM    1. Cellulitis and abscess of foot L03.119 682.7 trimethoprim-sulfamethoxazole (BACTRIM DS, SEPTRA DS) 160-800 mg per tablet    L02.619     2. Eczema, dyshidrotic L30.1 705.81 clobetasol (TEMOVATE) 0.05 % topical cream     PLAN:  Continue the Bactrim DS for another 10days. We discussed skin care. For the hands:  Temovate with moisturizer like Cetaphil bid for two weeks the one week no temovate, repeat. May do the same for the feet . I reviewed ED notes and labs. Pt's daughters are to call with any concerns.     I have discussed the diagnosis with the patient and the intended plan as seen in the above orders. The patient has received an after-visit summary and questions were answered concerning future plans. I have discussed medication side effects and warnings with the patient as well. Patient will call for further questions. Follow-up Disposition:  Return if symptoms worsen or fail to improve.

## 2019-09-10 NOTE — PATIENT INSTRUCTIONS
Medicare Wellness Visit, Female     The best way to live healthy is to have a lifestyle where you eat a well-balanced diet, exercise regularly, limit alcohol use, and quit all forms of tobacco/nicotine, if applicable. Regular preventive services are another way to keep healthy. Preventive services (vaccines, screening tests, monitoring & exams) can help personalize your care plan, which helps you manage your own care. Screening tests can find health problems at the earliest stages, when they are easiest to treat. [facility] follows the current, evidence-based guidelines published by the [facility] Claritza ([facility]) when recommending preventive services for our patients. Because we follow these guidelines, sometimes recommendations change over time as research supports it. (For example, mammograms used to be recommended annually. Even though Medicare will still pay for an annual mammogram, the newer guidelines recommend a mammogram every two years for women of average risk.)  Of course, you and your doctor may decide to screen more often for some diseases, based on your risk and your health status. Preventive services for you include:  - Medicare offers their members a free annual wellness visit, which is time for you and your primary care provider to discuss and plan for your preventive service needs. Take advantage of this benefit every year!  -All adults over the age of 72 should receive the recommended pneumonia vaccines. Current USPSTF guidelines recommend a series of two vaccines for the best pneumonia protection.   -All adults should have a flu vaccine yearly and a tetanus vaccine every 10 years. All adults age 61 and older should receive a shingles vaccine once in their lifetime.    -A bone mass density test is recommended when a woman turns 65 to screen for osteoporosis. This test is only recommended one time, as a screening.  Some providers will use this same test as a disease monitoring tool if you already have osteoporosis. -All adults age 38-68 who are overweight should have a diabetes screening test once every three years.   -Other screening tests and preventive services for persons with diabetes include: an eye exam to screen for diabetic retinopathy, a kidney function test, a foot exam, and stricter control over your cholesterol.   -Cardiovascular screening for adults with routine risk involves an electrocardiogram (ECG) at intervals determined by your doctor.   -Colorectal cancer screenings should be done for adults age 54-65 with no increased risk factors for colorectal cancer. There are a number of acceptable methods of screening for this type of cancer. Each test has its own benefits and drawbacks. Discuss with your doctor what is most appropriate for you during your annual wellness visit. The different tests include: colonoscopy (considered the best screening method), a fecal occult blood test, a fecal DNA test, and sigmoidoscopy. -Breast cancer screenings are recommended every other year for women of normal risk, age 54-69.  -Cervical cancer screenings for women over age 72 are only recommended with certain risk factors.   -All adults born between [facility] should be screened once for Hepatitis C.      Here is a list of your current Health Maintenance items (your personalized list of preventive services) with a due date:  Health Maintenance Due   Topic Date Due    DTaP/Tdap/Td  (1 - Tdap) 11/27/1948    Shingles Vaccine (1 of 2) 11/27/1977    Pneumococcal Vaccine (1 of 2 - PCV13) 11/27/1992    Glaucoma Screening   06/09/2017    Annual Well Visit  05/01/2019    Flu Vaccine  08/01/2019

## 2019-09-10 NOTE — PROGRESS NOTES
Pt is here for 6 month follow up HTN, cholesterol  Medicare Wellness    1. Have you been to the ER, urgent care clinic since your last visit? Hospitalized since your last visit? No    2. Have you seen or consulted any other health care providers outside of the [address] [place] [address] since your last visit? Include any pap smears or colon screening. No      This is the Subsequent Medicare Annual Wellness Exam, performed 12 months or more after the Initial AWV or the last Subsequent AWV    I have reviewed the patient's medical history in detail and updated the computerized patient record.      History     Past Medical History:   Diagnosis Date    Anemia 1/11/2010    thrombocytopenia    Anxiety 1/11/2010    Atrial fibrillation (HCC) 1/11/2010    Atrial flutter (HCC)     recurrent    CAD (coronary artery disease) 8/3/2010    Cataract 1/11/2010    Coronary atherosclerosis of native coronary artery     CVA (cerebral infarction)     Depression 3/18/2014    Diverticulitis 11/19/2014    DVT (deep venous thrombosis) ([address] 1/1/2007    Dysarthria     Essential hypertension, benign     GERD (gastroesophageal reflux disease) 1/11/2010    History of blood transfusion 2/16/2016    HLD (hyperlipidemia) 1/11/2010    HTN (hypertension) 1/11/2010    Intermediate coronary syndrome (HCC)     Nonspecific abnormal electrocardiogram (ECG) (EKG)     Osteoporosis 1/4/2016    Pre-operative cardiovascular examination     PSVT (paroxysmal supraventricular tachycardia) ([address] 1/11/2010    Pulmonary embolism ([address] 1/1/2007    Right knee pain 1/11/2010    Stroke ([address]     Syncope 5/13/2009    Thrombocytosis ([address]     Unspecified combined systolic and diastolic heart failure       Past Surgical History:   Procedure Laterality Date    CARDIAC SURG PROCEDURE UNLIST      ablation    HX CATARACT REMOVAL      bilateral    HX CHOLECYSTECTOMY      HX OTHER SURGICAL      IVC filter    HX PACEMAKER      IR IVC FILTER PLACEMENT PERCUTANEOUS  2007     Current Outpatient Medications   Medication Sig Dispense Refill    losartan (COZAAR) 50 mg tablet TAKE 1 TABLET BY MOUTH TWICE DAILY 180 Tab 0    IRON, FERROUS SULFATE, 325 mg (65 mg iron) tablet TAKE 1 TABLET BY MOUTH ONCE DAILY BEFORE  BREAKFAST 180 Tab 0    metoprolol tartrate (LOPRESSOR) 50 mg tablet TAKE ONE TABLET BY MOUTH ONCE DAILY 90 Tab 1    cyanocobalamin 1,000 mcg tablet Take 1 Tab by mouth daily. 30 Tab 1    senna-docusate (PERICOLACE) 8.6-50 mg per tablet Take 1 tablet by mouth daily. 30 tablet 0    clobetasol (TEMOVATE) 0.05 % topical cream Apply  to affected area two (2) times a day. 15 g 0    mupirocin (BACTROBAN) 2 % ointment Apply  to affected area daily. 22 g 0    pravastatin (PRAVACHOL) 10 mg tablet TAKE ONE TABLET BY MOUTH ONCE NIGHTLY 90 Tab 0    furosemide (LASIX) 20 mg tablet TAKE 1 TABLET BY MOUTH ONCE DAILY 30 Tab 1    pantoprazole (PROTONIX) 40 mg tablet Take 1 Tab by mouth daily. 90 Tab 1    amLODIPine (NORVASC) 5 mg tablet Take 5 mg by mouth daily.  polyethylene glycol (MIRALAX) 17 gram/dose powder Take 17 g by mouth daily.  527 g 1     Allergies   Allergen Reactions    Pcn [Penicillins] Hives     Family History   Problem Relation Age of Onset   Ginger Wilson Arthritis-osteo Mother     Hypertension Mother     Arthritis-osteo Father     Coronary Artery Disease Neg Hx      Social History     Tobacco Use    Smoking status: Never Smoker    Smokeless tobacco: Never Used   Substance Use Topics    Alcohol use: No     Alcohol/week: 0.0 standard drinks     Patient Active Problem List   Diagnosis Code    HTN (hypertension) I10    GERD (gastroesophageal reflux disease) K21.9    Anxiety F41.9    Pulmonary embolism (HCC) I26.99    DVT (deep venous thrombosis) ([facility]) I82.409    Anemia D64.9    HLD (hyperlipidemia) E78.5    Atrial fibrillation (HCC) I48.91    PSVT (paroxysmal supraventricular tachycardia) ([facility]) I47.1    Syncope R55    Cataract H26.9  Right knee pain M25.561    CAD (coronary artery disease) I25.10    Hypertrophic cardiomyopathy (HCC) I42.2    Cardiomegaly I51.7    Palpitations R00.2    Atrial flutter (HCC) I48.92    Coronary atherosclerosis of native coronary artery I25.10    Essential hypertension, benign I10    Unspecified combined systolic and diastolic heart failure I02.49    Pre-operative cardiovascular examination Z01.810    Nonspecific abnormal electrocardiogram (ECG) (EKG) R94.31    Intermediate coronary syndrome (HCC) I20.0    Depression F32.9    GI bleed K92.2    Diverticulitis K57.92    S/P ablation operation for arrhythmia Z98.890, Z86.79    CVA (cerebral infarction) I63.9    Acute right-sided weakness R53.1    Thrombocytopenia (HCC) D69.6    Osteoporosis M81.0    Acute GI bleeding K92.2    Acute blood loss anemia D62    Diverticulosis K57.90    History of blood transfusion Z92.89    Lower GI bleed K92.2    Advance directive discussed with patient Z71.89    Monoclonal gammopathy of undetermined significance D47.2       Depression Risk Factor Screening:     3 most recent PHQ Screens 9/10/2019   Little interest or pleasure in doing things Not at all   Feeling down, depressed, irritable, or hopeless Not at all   Total Score PHQ 2 0   Trouble falling or staying asleep, or sleeping too much -   Feeling tired or having little energy -   Poor appetite, weight loss, or overeating -   Feeling bad about yourself - or that you are a failure or have let yourself or your family down -   Trouble concentrating on things such as school, work, reading, or watching TV -   Moving or speaking so slowly that other people could have noticed; or the opposite being so fidgety that others notice -   Thoughts of being better off dead, or hurting yourself in some way -   PHQ 9 Score -   How difficult have these problems made it for you to do your work, take care of your home and get along with others -     Alcohol Risk Factor Screening:   None    Functional Ability and Level of Safety:   Hearing Loss  Hearing is good. Activities of Daily Living  The home contains: handrails and grab bars  Patient needs help with:  phone, transportation, shopping, preparing meals, laundry, housework, managing medications, managing money, dressing, bathing, hygiene, bathroom needs and walking    Fall Risk  Fall Risk Assessment, last 12 mths 9/10/2019   Able to walk? Yes   Fall in past 12 months? Yes   Fall with injury? No   Number of falls in past 12 months 1   Fall Risk Score 1       Abuse Screen  Patient is not abused    Cognitive Screening   Evaluation of Cognitive Function:  Has your family/caregiver stated any concerns about your memory: yes  Normal, Abnormal    Patient Care Team   Patient Care Team:  Linden Grimaldo NP as PCP - General (Nurse Practitioner)  Tawana Alvarez RN as Ambulatory Care Navigator  Lillian Gardner MD as Physician (Internal Medicine)  May Duverney, MD (Cardiology)  Donte Ott MD (Neurology)    Assessment/Plan   Education and counseling provided:  Are appropriate based on today's review and evaluation    Diagnoses and all orders for this visit:    1. Pure hypercholesterolemia  -     pravastatin (PRAVACHOL) 10 mg tablet; TAKE ONE TABLET BY MOUTH ONCE NIGHTLY    2. Essential hypertension  -     metoprolol tartrate (LOPRESSOR) 50 mg tablet; TAKE ONE TABLET BY MOUTH ONCE DAILY    3. Gastroesophageal reflux disease without esophagitis  -     pantoprazole (PROTONIX) 40 mg tablet; Take 1 Tab by mouth daily.     4. Bilateral lower extremity edema  -     furosemide (LASIX) 20 mg tablet; TAKE 1 TABLET BY MOUTH ONCE DAILY    Other orders  -     losartan (COZAAR) 50 mg tablet; TAKE 1 TABLET BY MOUTH TWICE DAILY        Health Maintenance Due   Topic Date Due    DTaP/Tdap/Td series (1 - Tdap) 11/27/1948    Shingrix Vaccine Age 50> (1 of 2) 11/27/1977    Pneumococcal 65+ years (1 of 2 - PCV13) 11/27/1992    GLAUCOMA SCREENING Q2Y  06/09/2017    MEDICARE YEARLY EXAM  05/01/2019    Influenza Age 9 to Adult  08/01/2019     PLAN:    I have discussed the diagnosis with the patient and the intended plan as seen in the above orders. The patient has received an after-visit summary and questions were answered concerning future plans. I have discussed medication side effects and warnings with the patient as well. Patient will call for further questions.

## 2019-09-11 NOTE — PROGRESS NOTES
HISTORY OF PRESENT ILLNESS  [id number] Roman La is a 80 y.o. female. Patient presents for chronic are and other issues. HPI  Pt is with her daughter. She would like a ref to dermatology. She is itching all over. She has become very anxious over this per daughter. Pt is in a wheelchair. Review of Systems   Constitutional: Negative. Respiratory: Negative. Cardiovascular: Negative. Gastrointestinal: Negative. Skin: Positive for itching. Negative for rash. Psychiatric/Behavioral: The patient is nervous/anxious. Visit Vitals  BP (!) 189/102 (BP 1 Location: Left arm)   Pulse 80   Temp 98.1 °F (36.7 °C) (Oral)   Resp 16   Ht 5' 1\" (1.549 m)   Wt 127 lb 3.2 oz (57.7 kg)   BMI 24.03 kg/m²     Physical Exam   Constitutional: She appears well-developed. Cardiovascular: Normal rate, regular rhythm and normal heart sounds. No murmur heard. Pulmonary/Chest: Effort normal and breath sounds normal. No respiratory distress. She has no wheezes. She has no rales. Skin: No rash noted. No erythema. Psychiatric: Judgment and thought content normal. Her mood appears anxious. She is agitated. Cognition and memory are impaired. She is noncommunicative. ASSESSMENT and PLAN    ICD-10-CM ICD-9-CM    1. Medicare annual wellness visit, subsequent Z00.00 V70.0    2. Pure hypercholesterolemia E78.00 272.0 pravastatin (PRAVACHOL) 10 mg tablet      LIPID PANEL   3. Essential hypertension I10 401.9 metoprolol tartrate (LOPRESSOR) 50 mg tablet      METABOLIC PANEL, COMPREHENSIVE   4. Gastroesophageal reflux disease without esophagitis K21.9 530.81 pantoprazole (PROTONIX) 40 mg tablet   5. Bilateral lower extremity edema R60.0 782.3 furosemide (LASIX) 20 mg tablet   6. Other iron deficiency anemia D50.8 280.8 CBC WITH AUTOMATED DIFF     PLAN:  I discussed my concern regarding the sudden change in her mother's behavior. Her mother is more confused than her norm and seems to be hallucinating.   I explained to Pt's daughter that dehydration or infection can cause this behavior. Pt was unable to provide a urine sample as she just went prior to me coming in to the room. Pt's daughter was given a urine cup to collect a sample and I asked her to bring it back to OV tomorrow. Diet as tolerated but I asked her to make sure she was drinking water. I asked Pt's daughter to start Cipro tonight. And to call with any concerns. I have discussed the diagnosis with the patient and the intended plan as seen in the above orders. The patient has received an after-visit summary and questions were answered concerning future plans. I have discussed medication side effects and warnings with the patient as well. Patient will call for further questions.

## 2019-09-14 NOTE — PROGRESS NOTES
I have tried several times to call patient but no answer from her or her daughter. Can you please try her daughter again.

## 2019-09-20 NOTE — DISCHARGE INSTRUCTIONS
Patient Education        Fainting: Care Instructions  Your Care Instructions    When you faint, or pass out, you lose consciousness for a short time. A brief drop in blood flow to the brain often causes it. When you fall or lie down, more blood flows to your brain and you regain consciousness. Emotional stress, pain, or overheating--especially if you have been standing--can make you faint. In these cases, fainting is usually not serious. But fainting can be a sign of a more serious problem. Your doctor may want you to have more tests to rule out other causes. The treatment you need depends on the reason why you fainted. The doctor has checked you carefully, but problems can develop later. If you notice any problems or new symptoms, get medical treatment right away. Follow-up care is a key part of your treatment and safety. Be sure to make and go to all appointments, and call your doctor if you are having problems. It's also a good idea to know your test results and keep a list of the medicines you take. How can you care for yourself at home? · Drink plenty of fluids to prevent dehydration. If you have kidney, heart, or liver disease and have to limit fluids, talk with your doctor before you increase your fluid intake. When should you call for help? Call 911 anytime you think you may need emergency care. For example, call if:    · You have symptoms of a heart problem. These may include:  ? Chest pain or pressure. ? Severe trouble breathing. ? A fast or irregular heartbeat. ? Lightheadedness or sudden weakness. ? Coughing up pink, foamy mucus. ? Passing out. After you call 911, the  may tell you to chew 1 adult-strength or 2 to 4 low-dose aspirin. Wait for an ambulance. Do not try to drive yourself.     · You have symptoms of a stroke. These may include:  ? Sudden numbness, tingling, weakness, or loss of movement in your face, arm, or leg, especially on only one side of your body.   ? Sudden vision changes. ? Sudden trouble speaking. ? Sudden confusion or trouble understanding simple statements. ? Sudden problems with walking or balance. ? A sudden, severe headache that is different from past headaches.     · You passed out (lost consciousness) again.    Watch closely for changes in your health, and be sure to contact your doctor if:    · You do not get better as expected. Where can you learn more? Go to http://cachorro-itzel.info/. Enter G553 in the search box to learn more about \"Fainting: Care Instructions. \"  Current as of: June 26, 2019  Content Version: 12.2  © 5050-8880 [employer]. Care instructions adapted under license by [employer] (which disclaims liability or warranty for this information). If you have questions about a medical condition or this instruction, always ask your healthcare professional. [address] any warranty or liability for your use of this information. Patient Education        Urinary Tract Infection in Women: Care Instructions  Your Care Instructions    A urinary tract infection, or UTI, is a general term for an infection anywhere between the kidneys and the urethra (where urine comes out). Most UTIs are bladder infections. They often cause pain or burning when you urinate. UTIs are caused by bacteria and can be cured with antibiotics. Be sure to complete your treatment so that the infection goes away. Follow-up care is a key part of your treatment and safety. Be sure to make and go to all appointments, and call your doctor if you are having problems. It's also a good idea to know your test results and keep a list of the medicines you take. How can you care for yourself at home? · Take your antibiotics as directed. Do not stop taking them just because you feel better. You need to take the full course of antibiotics. · Drink extra water and other fluids for the next day or two.  This may help wash out the bacteria that are causing the infection. (If you have kidney, heart, or liver disease and have to limit fluids, talk with your doctor before you increase your fluid intake.)  · Avoid drinks that are carbonated or have caffeine. They can irritate the bladder. · Urinate often. Try to empty your bladder each time. · To relieve pain, take a hot bath or lay a heating pad set on low over your lower belly or genital area. Never go to sleep with a heating pad in place. To prevent UTIs  · Drink plenty of water each day. This helps you urinate often, which clears bacteria from your system. (If you have kidney, heart, or liver disease and have to limit fluids, talk with your doctor before you increase your fluid intake.)  · Urinate when you need to. · Urinate right after you have sex. · Change sanitary pads often. · Avoid douches, bubble baths, feminine hygiene sprays, and other feminine hygiene products that have deodorants. · After going to the bathroom, wipe from front to back. When should you call for help? Call your doctor now or seek immediate medical care if:    · Symptoms such as fever, chills, nausea, or vomiting get worse or appear for the first time.     · You have new pain in your back just below your rib cage. This is called flank pain.     · There is new blood or pus in your urine.     · You have any problems with your antibiotic medicine.    Watch closely for changes in your health, and be sure to contact your doctor if:    · You are not getting better after taking an antibiotic for 2 days.     · Your symptoms go away but then come back. Where can you learn more? Go to http://cachorro-itzel.info/. Enter E124 in the search box to learn more about \"Urinary Tract Infection in Women: Care Instructions. \"  Current as of: December 19, 2018  Content Version: 12.2  © 2166-4082 [email or web address].  Care instructions adapted under license by [employer] (which disclaims liability or warranty for this information). If you have questions about a medical condition or this instruction, always ask your healthcare professional. [place] [address] any warranty or liability for your use of this information. Patient Education        Thrombocytopenia: Care Instructions  Your Care Instructions    Thrombocytopenia is a low number of platelets in the blood. Platelets are the cells that help blood clot. If you don't have enough of them, your blood cannot clot well. So it is harder to stop bleeding. You may have low platelets because your bone marrow does not make them. Or your body's defenses (immune system) may destroy them. Having an enlarged spleen can also reduce the number of platelets in your blood. This is because they can get trapped in the enlarged spleen. Some diseases or medicines may also cause low platelets. But platelets may go back to normal levels if the disease is treated or the medicine is stopped. You may not need treatment if your problem is mild. If you do need treatment, you may have platelets added to your blood. Or you may get medicine to stop the loss of platelets or help your body make them. Follow-up care is a key part of your treatment and safety. Be sure to make and go to all appointments, and call your doctor if you are having problems. It's also a good idea to know your test results and keep a list of the medicines you take. How can you care for yourself at home? · Be safe with medicines. Take your medicines exactly as prescribed. Call your doctor if you think you are having a problem with your medicine. · Do not take aspirin or anti-inflammatory medicines unless your doctor says it is okay. Examples are ibuprofen (Advil, Motrin) and naproxen (Aleve). They may increase the risk of bleeding. · Avoid contact sports or activities that could cause you to fall. When should you call for help?   Call 911 anytime you think you may need emergency care. For example, call if:    · You passed out (lost consciousness).     · You have signs of severe bleeding, which includes:  ? You have a severe headache that is different from past headaches. ? You vomit blood or what looks like coffee grounds. ? Your stools are maroon or very bloody.    Call your doctor now or seek immediate medical care if:    · You are dizzy or lightheaded, or you feel like you may faint.     · You have abnormal bleeding, such as:  ? A nosebleed that you can't easily stop. ? Your stools are black and look like tar, or they have streaks of blood. ? You have blood in your urine. ? You have joint pain. ? You have bruises or blood spots under your skin.    Watch closely for changes in your health, and be sure to contact your doctor if:    · You do not get better as expected. Where can you learn more? Go to http://cachorro-itzel.info/. Enter S215 in the search box to learn more about \"Thrombocytopenia: Care Instructions. \"  Current as of: March 28, 2019  Content Version: 12.2  © 0298-5172 [employer]. Care instructions adapted under license by [employer] (which disclaims liability or warranty for this information). If you have questions about a medical condition or this instruction, always ask your healthcare professional. [address] any warranty or liability for your use of this information.

## 2019-09-20 NOTE — ED PROVIDER NOTES
EMERGENCY DEPARTMENT HISTORY AND PHYSICAL EXAM 
This was created with voice recognition software and transcription errors may be present. 10:24 AM 
Date: 9/20/2019 Patient Name: Dianna Gonzalez History of Presenting Illness Chief Complaint: 
 
History Provided By:  
 
HPI: Dianna Gonzalez is a 80 y.o. female past medical history of anemia thrombocytopenia anxiety A. fib a flutter coronary disease cataracts stroke depression diverticulitis DVT dysarthria PSVT PE stroke syncope thrombocytopenia status post pacemaker who presents with loss of consciousness. Patient was reportedly sitting in the chair in the bathroom when she went unresponsive slumped over in her teeth dropped out of her mouth. She has no recollection of the events she did not convulse no fever no chills she is at baseline now. History of dementia PCP: Jong Mojica NP Past History Past Medical History: 
Past Medical History:  
Diagnosis Date  Anemia 1/11/2010  
 thrombocytopenia  Anxiety 1/11/2010  Atrial fibrillation ([address] 1/11/2010  Atrial flutter ([address] recurrent  CAD (coronary artery disease) 8/3/2010  Cataract 1/11/2010  Coronary atherosclerosis of native coronary artery  CVA (cerebral infarction)  Depression 3/18/2014  Diverticulitis 11/19/2014  DVT (deep venous thrombosis) ([address] 1/1/2007  Dysarthria  Essential hypertension, benign  GERD (gastroesophageal reflux disease) 1/11/2010  
 History of blood transfusion 2/16/2016  HLD (hyperlipidemia) 1/11/2010  
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 
Past Surgical History:  
Procedure Laterality Date  CARDIAC SURG PROCEDURE UNLIST    
 ablation  HX CATARACT REMOVAL    
 bilateral  
 HX CHOLECYSTECTOMY  HX OTHER SURGICAL    
 IVC filter  HX PACEMAKER    
 IR IVC FILTER PLACEMENT PERCUTANEOUS  2007 Family History: 
Family History Problem Relation Age of Onset 24 [facility] Arthritis-osteo Mother  Hypertension Mother  Arthritis-osteo Father  Coronary Artery Disease Neg Hx Social History: 
Social History Tobacco Use  Smoking status: Never Smoker  Smokeless tobacco: Never Used Substance Use Topics  Alcohol use: No  
  Alcohol/week: 0.0 standard drinks  Drug use: No  
 
 
Allergies: Allergies Allergen Reactions  Pcn [Penicillins] Hives Review of Systems Review of Systems Constitutional: Negative for chills and fever. HENT: Negative for congestion. All other systems reviewed and are negative. 10 point review of systems otherwise negative unless noted in HPI. Physical Exam  
 
 
Physical Exam  
Constitutional: She appears well-developed. HENT:  
Head: Normocephalic and atraumatic. Eyes: Pupils are equal, round, and reactive to light. EOM are normal.  
Neck: Normal range of motion. Neck supple. Cardiovascular: Normal rate, regular rhythm and normal heart sounds. Exam reveals no friction rub. No murmur heard. Pulmonary/Chest: Effort normal and breath sounds normal. No respiratory distress. She has no wheezes. Abdominal: Soft. She exhibits no distension. There is no tenderness. There is no rebound and no guarding. Musculoskeletal: Normal range of motion. Neurological: She is alert. At baseline dementia Skin: Skin is warm and dry. Psychiatric: She has a normal mood and affect. Her behavior is normal. Thought content normal.  
 
 
Diagnostic Study Results Vital Signs Visit Vitals /65 Pulse 71 Temp 97.8 °F (36.6 °C) Resp 13 SpO2 100% EKG: EKG shows AV paced at 70 left axis negative for acute ischemia Labs: Mild hypokalemia troponin negative, CBC is baseline thrombocytopenia baseline anemia baseline neutropenia Imaging: Chest x-ray is negative Patient with penicillin allergy will give 1 dose of 3 g fosfomycin here Medical Decision Making ED Course: Progress Notes, Reevaluation, and Consults: 
 
 
Provider Notes (Medical Decision Making): 80-year-old female presents for loss of consciousness. Has a pacemaker in place. Mihir/ Vanessa Al from [employer]; pacemaker working appropriately, no events this am. 
 
D/w dr Yovani Gómez, ID  will give cipro 500mg no other options based on allergies, demenita, age and creatine. Diagnosis Clinical Impression: No diagnosis found. Disposition: 
 
Patient's Medications Start Taking No medications on file Continue Taking AMLODIPINE (NORVASC) 5 MG TABLET    Take 5 mg by mouth daily. CIPROFLOXACIN HCL (CIPRO) 500 MG TABLET    Take 1 Tab by mouth two (2) times a day for 10 days. CLOBETASOL (TEMOVATE) 0.05 % TOPICAL CREAM    Apply  to affected area two (2) times a day. CYANOCOBALAMIN 1,000 MCG TABLET    Take 1 Tab by mouth daily. FUROSEMIDE (LASIX) 20 MG TABLET    TAKE 1 TABLET BY MOUTH ONCE DAILY  
 IRON, FERROUS SULFATE, 325 MG (65 MG IRON) TABLET    TAKE 1 TABLET BY MOUTH ONCE DAILY BEFORE  BREAKFAST  
 LOSARTAN (COZAAR) 50 MG TABLET    TAKE 1 TABLET BY MOUTH TWICE DAILY METOPROLOL TARTRATE (LOPRESSOR) 50 MG TABLET    TAKE ONE TABLET BY MOUTH ONCE DAILY MUPIROCIN (BACTROBAN) 2 % OINTMENT    Apply  to affected area daily. PANTOPRAZOLE (PROTONIX) 40 MG TABLET    Take 1 Tab by mouth daily. POLYETHYLENE GLYCOL (MIRALAX) 17 GRAM/DOSE POWDER    Take 17 g by mouth daily. PRAVASTATIN (PRAVACHOL) 10 MG TABLET    TAKE ONE TABLET BY MOUTH ONCE NIGHTLY SENNA-DOCUSATE (PERICOLACE) 8.6-50 MG PER TABLET    Take 1 tablet by mouth daily. These Medications have changed No medications on file Stop Taking No medications on file

## 2019-09-20 NOTE — ED TRIAGE NOTES
Patient arrived to ER via EMS for complaint of altered mental status. Per EMS patient was non-responsive and altered. Blood glucose 137. Patient has a pacemaker. Patient is taking Ciprofloxacin for a urinary tract infection.

## 2020-01-01 ENCOUNTER — HOSPITAL ENCOUNTER (INPATIENT)
Age: 85
LOS: 6 days | Discharge: NURSING, REHAB OR LONG-TERM CARE | DRG: 481 | End: 2020-02-20
Attending: EMERGENCY MEDICINE | Admitting: HOSPITALIST
Payer: MEDICARE

## 2020-01-01 ENCOUNTER — APPOINTMENT (OUTPATIENT)
Dept: CT IMAGING | Age: 85
DRG: 481 | End: 2020-01-01
Attending: EMERGENCY MEDICINE
Payer: MEDICARE

## 2020-01-01 ENCOUNTER — PATIENT OUTREACH (OUTPATIENT)
Dept: CASE MANAGEMENT | Age: 85
End: 2020-01-01

## 2020-01-01 ENCOUNTER — ANESTHESIA (OUTPATIENT)
Dept: SURGERY | Age: 85
DRG: 481 | End: 2020-01-01
Payer: MEDICARE

## 2020-01-01 ENCOUNTER — ANESTHESIA EVENT (OUTPATIENT)
Dept: SURGERY | Age: 85
DRG: 481 | End: 2020-01-01
Payer: MEDICARE

## 2020-01-01 ENCOUNTER — APPOINTMENT (OUTPATIENT)
Dept: GENERAL RADIOLOGY | Age: 85
DRG: 481 | End: 2020-01-01
Attending: ORTHOPAEDIC SURGERY
Payer: MEDICARE

## 2020-01-01 ENCOUNTER — APPOINTMENT (OUTPATIENT)
Dept: INTERVENTIONAL RADIOLOGY/VASCULAR | Age: 85
DRG: 481 | End: 2020-01-01
Attending: PHYSICIAN ASSISTANT
Payer: MEDICARE

## 2020-01-01 ENCOUNTER — APPOINTMENT (OUTPATIENT)
Dept: GENERAL RADIOLOGY | Age: 85
DRG: 481 | End: 2020-01-01
Attending: EMERGENCY MEDICINE
Payer: MEDICARE

## 2020-01-01 ENCOUNTER — APPOINTMENT (OUTPATIENT)
Dept: CT IMAGING | Age: 85
End: 2020-01-01
Attending: STUDENT IN AN ORGANIZED HEALTH CARE EDUCATION/TRAINING PROGRAM
Payer: MEDICARE

## 2020-01-01 ENCOUNTER — APPOINTMENT (OUTPATIENT)
Dept: GENERAL RADIOLOGY | Age: 85
End: 2020-01-01
Attending: STUDENT IN AN ORGANIZED HEALTH CARE EDUCATION/TRAINING PROGRAM
Payer: MEDICARE

## 2020-01-01 ENCOUNTER — HOSPITAL ENCOUNTER (EMERGENCY)
Age: 85
Discharge: HOME | End: 2020-02-28
Attending: EMERGENCY MEDICINE
Payer: MEDICARE

## 2020-01-01 ENCOUNTER — OFFICE VISIT (OUTPATIENT)
Dept: FAMILY MEDICINE CLINIC | Age: 85
End: 2020-01-01

## 2020-01-01 VITALS
WEIGHT: 110 LBS | HEIGHT: 62 IN | HEART RATE: 69 BPM | BODY MASS INDEX: 20.24 KG/M2 | OXYGEN SATURATION: 100 % | TEMPERATURE: 98 F | RESPIRATION RATE: 17 BRPM | DIASTOLIC BLOOD PRESSURE: 75 MMHG | SYSTOLIC BLOOD PRESSURE: 149 MMHG

## 2020-01-01 VITALS
OXYGEN SATURATION: 99 % | RESPIRATION RATE: 16 BRPM | DIASTOLIC BLOOD PRESSURE: 98 MMHG | HEART RATE: 68 BPM | TEMPERATURE: 97.6 F | SYSTOLIC BLOOD PRESSURE: 184 MMHG

## 2020-01-01 VITALS
HEIGHT: 61 IN | DIASTOLIC BLOOD PRESSURE: 53 MMHG | WEIGHT: 120 LBS | RESPIRATION RATE: 15 BRPM | BODY MASS INDEX: 22.66 KG/M2 | SYSTOLIC BLOOD PRESSURE: 100 MMHG | TEMPERATURE: 97.2 F | HEART RATE: 70 BPM | OXYGEN SATURATION: 100 %

## 2020-01-01 DIAGNOSIS — D69.6 THROMBOCYTOPENIA (HCC): ICD-10-CM

## 2020-01-01 DIAGNOSIS — N17.9 AKI (ACUTE KIDNEY INJURY) (HCC): ICD-10-CM

## 2020-01-01 DIAGNOSIS — D61.818 PANCYTOPENIA (HCC): ICD-10-CM

## 2020-01-01 DIAGNOSIS — E86.1 HYPOVOLEMIA DEHYDRATION: Primary | ICD-10-CM

## 2020-01-01 DIAGNOSIS — E87.0 HYPERNATREMIA: ICD-10-CM

## 2020-01-01 DIAGNOSIS — I50.40 COMBINED SYSTOLIC AND DIASTOLIC HEART FAILURE, UNSPECIFIED HF CHRONICITY (HCC): ICD-10-CM

## 2020-01-01 DIAGNOSIS — I10 ESSENTIAL HYPERTENSION: Primary | ICD-10-CM

## 2020-01-01 DIAGNOSIS — R53.1 ACUTE RIGHT-SIDED WEAKNESS: ICD-10-CM

## 2020-01-01 DIAGNOSIS — S72.001A CLOSED FRACTURE OF RIGHT HIP, INITIAL ENCOUNTER (HCC): Primary | ICD-10-CM

## 2020-01-01 DIAGNOSIS — I47.1 PSVT (PAROXYSMAL SUPRAVENTRICULAR TACHYCARDIA) (HCC): ICD-10-CM

## 2020-01-01 DIAGNOSIS — D50.8 OTHER IRON DEFICIENCY ANEMIA: ICD-10-CM

## 2020-01-01 LAB
ABO + RH BLD: NORMAL
ALBUMIN SERPL-MCNC: 3.1 G/DL (ref 3.4–5)
ALBUMIN SERPL-MCNC: 3.4 G/DL (ref 3.4–5)
ALBUMIN SERPL-MCNC: 3.5 G/DL (ref 3.4–5)
ALBUMIN SERPL-MCNC: 3.8 G/DL (ref 3.5–4.6)
ALBUMIN/GLOB SERPL: 0.7 {RATIO} (ref 0.8–1.7)
ALBUMIN/GLOB SERPL: 0.7 {RATIO} (ref 0.8–1.7)
ALBUMIN/GLOB SERPL: 0.8 {RATIO} (ref 0.8–1.7)
ALBUMIN/GLOB SERPL: 1.1 {RATIO} (ref 1.2–2.2)
ALP SERPL-CCNC: 45 IU/L (ref 39–117)
ALP SERPL-CCNC: 46 U/L (ref 45–117)
ALP SERPL-CCNC: 52 U/L (ref 45–117)
ALP SERPL-CCNC: 59 U/L (ref 45–117)
ALT SERPL-CCNC: 13 IU/L (ref 0–32)
ALT SERPL-CCNC: 19 U/L (ref 13–56)
ALT SERPL-CCNC: 24 U/L (ref 13–56)
ALT SERPL-CCNC: 30 U/L (ref 13–56)
AMORPH CRY URNS QL MICRO: ABNORMAL
ANION GAP SERPL CALC-SCNC: 10 MMOL/L (ref 3–18)
ANION GAP SERPL CALC-SCNC: 6 MMOL/L (ref 3–18)
ANION GAP SERPL CALC-SCNC: 7 MMOL/L (ref 3–18)
ANION GAP SERPL CALC-SCNC: 7 MMOL/L (ref 3–18)
ANION GAP SERPL CALC-SCNC: 8 MMOL/L (ref 3–18)
ANION GAP SERPL CALC-SCNC: 8 MMOL/L (ref 3–18)
APPEARANCE UR: ABNORMAL
APPEARANCE UR: CLEAR
APTT PPP: 32 SEC (ref 23–36.4)
AST SERPL-CCNC: 30 IU/L (ref 0–40)
AST SERPL-CCNC: 30 U/L (ref 10–38)
AST SERPL-CCNC: 37 U/L (ref 10–38)
AST SERPL-CCNC: 37 U/L (ref 10–38)
ATRIAL RATE: 267 BPM
BACTERIA URNS QL MICRO: ABNORMAL /HPF
BACTERIA URNS QL MICRO: ABNORMAL /HPF
BASOPHILS # BLD AUTO: 0 X10E3/UL (ref 0–0.2)
BASOPHILS # BLD: 0 K/UL (ref 0–0.1)
BASOPHILS # BLD: 0.1 K/UL (ref 0–0.1)
BASOPHILS NFR BLD AUTO: 1 %
BASOPHILS NFR BLD: 0 % (ref 0–2)
BASOPHILS NFR BLD: 1 % (ref 0–2)
BASOPHILS NFR BLD: 1 % (ref 0–2)
BILIRUB SERPL-MCNC: 0.5 MG/DL (ref 0–1.2)
BILIRUB SERPL-MCNC: 1 MG/DL (ref 0.2–1)
BILIRUB SERPL-MCNC: 1.1 MG/DL (ref 0.2–1)
BILIRUB SERPL-MCNC: 1.3 MG/DL (ref 0.2–1)
BILIRUB UR QL: NEGATIVE
BILIRUB UR QL: NEGATIVE
BLD PROD TYP BPU: NORMAL
BLD PROD TYP BPU: NORMAL
BLOOD GROUP ANTIBODIES SERPL: NORMAL
BPU ID: NORMAL
BPU ID: NORMAL
BUN SERPL-MCNC: 11 MG/DL (ref 7–18)
BUN SERPL-MCNC: 12 MG/DL (ref 7–18)
BUN SERPL-MCNC: 17 MG/DL (ref 7–18)
BUN SERPL-MCNC: 18 MG/DL (ref 7–18)
BUN SERPL-MCNC: 18 MG/DL (ref 7–18)
BUN SERPL-MCNC: 50 MG/DL (ref 7–18)
BUN SERPL-MCNC: 9 MG/DL (ref 10–36)
BUN/CREAT SERPL: 12 (ref 12–20)
BUN/CREAT SERPL: 12 (ref 12–28)
BUN/CREAT SERPL: 13 (ref 12–20)
BUN/CREAT SERPL: 18 (ref 12–20)
BUN/CREAT SERPL: 19 (ref 12–20)
BUN/CREAT SERPL: 19 (ref 12–20)
BUN/CREAT SERPL: 26 (ref 12–20)
CALCIUM SERPL-MCNC: 7.9 MG/DL (ref 8.5–10.1)
CALCIUM SERPL-MCNC: 8.6 MG/DL (ref 8.5–10.1)
CALCIUM SERPL-MCNC: 8.6 MG/DL (ref 8.5–10.1)
CALCIUM SERPL-MCNC: 8.7 MG/DL (ref 8.5–10.1)
CALCIUM SERPL-MCNC: 8.9 MG/DL (ref 8.5–10.1)
CALCIUM SERPL-MCNC: 8.9 MG/DL (ref 8.5–10.1)
CALCIUM SERPL-MCNC: 8.9 MG/DL (ref 8.7–10.3)
CALCULATED R AXIS, ECG10: -59 DEGREES
CALCULATED T AXIS, ECG11: 128 DEGREES
CALLED TO:,BCALL1: NORMAL
CHLORIDE SERPL-SCNC: 104 MMOL/L (ref 96–106)
CHLORIDE SERPL-SCNC: 105 MMOL/L (ref 100–111)
CHLORIDE SERPL-SCNC: 106 MMOL/L (ref 100–111)
CHLORIDE SERPL-SCNC: 108 MMOL/L (ref 100–111)
CHLORIDE SERPL-SCNC: 112 MMOL/L (ref 100–111)
CHLORIDE SERPL-SCNC: 112 MMOL/L (ref 100–111)
CHLORIDE SERPL-SCNC: 123 MMOL/L (ref 100–111)
CO2 SERPL-SCNC: 22 MMOL/L (ref 20–29)
CO2 SERPL-SCNC: 24 MMOL/L (ref 21–32)
CO2 SERPL-SCNC: 24 MMOL/L (ref 21–32)
CO2 SERPL-SCNC: 26 MMOL/L (ref 21–32)
CO2 SERPL-SCNC: 27 MMOL/L (ref 21–32)
CO2 SERPL-SCNC: 29 MMOL/L (ref 21–32)
CO2 SERPL-SCNC: 29 MMOL/L (ref 21–32)
COLOR UR: YELLOW
COLOR UR: YELLOW
CREAT SERPL-MCNC: 0.78 MG/DL (ref 0.57–1)
CREAT SERPL-MCNC: 0.94 MG/DL (ref 0.6–1.3)
CREAT SERPL-MCNC: 0.96 MG/DL (ref 0.6–1.3)
CREAT SERPL-MCNC: 0.96 MG/DL (ref 0.6–1.3)
CREAT SERPL-MCNC: 1.92 MG/DL (ref 0.6–1.3)
DIAGNOSIS, 93000: NORMAL
DIFFERENTIAL METHOD BLD: ABNORMAL
EOSINOPHIL # BLD AUTO: 0.1 X10E3/UL (ref 0–0.4)
EOSINOPHIL # BLD: 0 K/UL (ref 0–0.4)
EOSINOPHIL # BLD: 0 K/UL (ref 0–0.4)
EOSINOPHIL # BLD: 0.1 K/UL (ref 0–0.4)
EOSINOPHIL # BLD: 0.2 K/UL (ref 0–0.4)
EOSINOPHIL NFR BLD AUTO: 2 %
EOSINOPHIL NFR BLD: 0 % (ref 0–5)
EOSINOPHIL NFR BLD: 0 % (ref 0–5)
EOSINOPHIL NFR BLD: 1 % (ref 0–5)
EOSINOPHIL NFR BLD: 2 % (ref 0–5)
EOSINOPHIL NFR BLD: 2 % (ref 0–5)
EOSINOPHIL NFR BLD: 4 % (ref 0–5)
EPITH CASTS URNS QL MICRO: ABNORMAL /LPF (ref 0–5)
EPITH CASTS URNS QL MICRO: NEGATIVE /LPF (ref 0–5)
ERYTHROCYTE [DISTWIDTH] IN BLOOD BY AUTOMATED COUNT: 14.5 % (ref 11.7–15.4)
ERYTHROCYTE [DISTWIDTH] IN BLOOD BY AUTOMATED COUNT: 15.8 % (ref 11.6–14.5)
ERYTHROCYTE [DISTWIDTH] IN BLOOD BY AUTOMATED COUNT: 15.9 % (ref 11.6–14.5)
ERYTHROCYTE [DISTWIDTH] IN BLOOD BY AUTOMATED COUNT: 16.4 % (ref 11.6–14.5)
ERYTHROCYTE [DISTWIDTH] IN BLOOD BY AUTOMATED COUNT: 16.6 % (ref 11.6–14.5)
ERYTHROCYTE [DISTWIDTH] IN BLOOD BY AUTOMATED COUNT: 16.8 % (ref 11.6–14.5)
ERYTHROCYTE [DISTWIDTH] IN BLOOD BY AUTOMATED COUNT: 16.9 % (ref 11.6–14.5)
ERYTHROCYTE [DISTWIDTH] IN BLOOD BY AUTOMATED COUNT: 17.2 % (ref 11.6–14.5)
ERYTHROCYTE [DISTWIDTH] IN BLOOD BY AUTOMATED COUNT: 18 % (ref 11.6–14.5)
ERYTHROCYTE [DISTWIDTH] IN BLOOD BY AUTOMATED COUNT: 18.9 % (ref 11.6–14.5)
GLOBULIN SER CALC-MCNC: 3.5 G/DL (ref 1.5–4.5)
GLOBULIN SER CALC-MCNC: 4.5 G/DL (ref 2–4)
GLOBULIN SER CALC-MCNC: 4.5 G/DL (ref 2–4)
GLOBULIN SER CALC-MCNC: 5 G/DL (ref 2–4)
GLUCOSE SERPL-MCNC: 105 MG/DL (ref 65–99)
GLUCOSE SERPL-MCNC: 106 MG/DL (ref 74–99)
GLUCOSE SERPL-MCNC: 106 MG/DL (ref 74–99)
GLUCOSE SERPL-MCNC: 115 MG/DL (ref 74–99)
GLUCOSE SERPL-MCNC: 116 MG/DL (ref 74–99)
GLUCOSE SERPL-MCNC: 97 MG/DL (ref 74–99)
GLUCOSE SERPL-MCNC: 99 MG/DL (ref 74–99)
GLUCOSE UR STRIP.AUTO-MCNC: NEGATIVE MG/DL
GLUCOSE UR STRIP.AUTO-MCNC: NEGATIVE MG/DL
GRAN CASTS URNS QL MICRO: ABNORMAL /LPF
HCT VFR BLD AUTO: 30.6 % (ref 35–45)
HCT VFR BLD AUTO: 31.8 % (ref 35–45)
HCT VFR BLD AUTO: 32.1 % (ref 35–45)
HCT VFR BLD AUTO: 32.7 % (ref 35–45)
HCT VFR BLD AUTO: 33.6 % (ref 35–45)
HCT VFR BLD AUTO: 35.6 % (ref 34–46.6)
HCT VFR BLD AUTO: 35.7 % (ref 35–45)
HCT VFR BLD AUTO: 36.8 % (ref 35–45)
HCT VFR BLD AUTO: 39 % (ref 35–45)
HCT VFR BLD AUTO: 42.1 % (ref 35–45)
HGB BLD-MCNC: 10 G/DL (ref 12–16)
HGB BLD-MCNC: 10.2 G/DL (ref 12–16)
HGB BLD-MCNC: 10.3 G/DL (ref 12–16)
HGB BLD-MCNC: 10.6 G/DL (ref 12–16)
HGB BLD-MCNC: 10.8 G/DL (ref 12–16)
HGB BLD-MCNC: 11.1 G/DL (ref 11.1–15.9)
HGB BLD-MCNC: 11.8 G/DL (ref 12–16)
HGB BLD-MCNC: 11.8 G/DL (ref 12–16)
HGB BLD-MCNC: 12.8 G/DL (ref 12–16)
HGB BLD-MCNC: 13 G/DL (ref 12–16)
HGB UR QL STRIP: ABNORMAL
HGB UR QL STRIP: NEGATIVE
HYALINE CASTS URNS QL MICRO: ABNORMAL /LPF (ref 0–2)
IMM GRANULOCYTES # BLD AUTO: 0 X10E3/UL (ref 0–0.1)
IMM GRANULOCYTES NFR BLD AUTO: 0 %
INR PPP: 1.3 (ref 0.8–1.2)
KETONES UR QL STRIP.AUTO: 15 MG/DL
KETONES UR QL STRIP.AUTO: 15 MG/DL
LEUKOCYTE ESTERASE UR QL STRIP.AUTO: NEGATIVE
LEUKOCYTE ESTERASE UR QL STRIP.AUTO: NEGATIVE
LYMPHOCYTES # BLD AUTO: 0.6 X10E3/UL (ref 0.7–3.1)
LYMPHOCYTES # BLD: 0.6 K/UL (ref 0.9–3.6)
LYMPHOCYTES # BLD: 0.6 K/UL (ref 0.9–3.6)
LYMPHOCYTES # BLD: 0.7 K/UL (ref 0.9–3.6)
LYMPHOCYTES # BLD: 0.8 K/UL (ref 0.9–3.6)
LYMPHOCYTES # BLD: 0.9 K/UL (ref 0.9–3.6)
LYMPHOCYTES # BLD: 1.1 K/UL (ref 0.9–3.6)
LYMPHOCYTES # BLD: 1.4 K/UL (ref 0.9–3.6)
LYMPHOCYTES NFR BLD AUTO: 15 %
LYMPHOCYTES NFR BLD: 10 % (ref 21–52)
LYMPHOCYTES NFR BLD: 10 % (ref 21–52)
LYMPHOCYTES NFR BLD: 14 % (ref 21–52)
LYMPHOCYTES NFR BLD: 14 % (ref 21–52)
LYMPHOCYTES NFR BLD: 15 % (ref 21–52)
LYMPHOCYTES NFR BLD: 18 % (ref 21–52)
LYMPHOCYTES NFR BLD: 9 % (ref 21–52)
MAGNESIUM SERPL-MCNC: 2 MG/DL (ref 1.6–2.6)
MCH RBC QN AUTO: 29.4 PG (ref 24–34)
MCH RBC QN AUTO: 29.5 PG (ref 24–34)
MCH RBC QN AUTO: 29.6 PG (ref 26.6–33)
MCH RBC QN AUTO: 29.8 PG (ref 24–34)
MCH RBC QN AUTO: 29.8 PG (ref 24–34)
MCH RBC QN AUTO: 29.9 PG (ref 24–34)
MCH RBC QN AUTO: 29.9 PG (ref 24–34)
MCH RBC QN AUTO: 30 PG (ref 24–34)
MCH RBC QN AUTO: 30.2 PG (ref 24–34)
MCH RBC QN AUTO: 30.7 PG (ref 24–34)
MCHC RBC AUTO-ENTMCNC: 30.9 G/DL (ref 31–37)
MCHC RBC AUTO-ENTMCNC: 31.2 G/DL (ref 31.5–35.7)
MCHC RBC AUTO-ENTMCNC: 32.1 G/DL (ref 31–37)
MCHC RBC AUTO-ENTMCNC: 32.4 G/DL (ref 31–37)
MCHC RBC AUTO-ENTMCNC: 32.7 G/DL (ref 31–37)
MCHC RBC AUTO-ENTMCNC: 32.8 G/DL (ref 31–37)
MCHC RBC AUTO-ENTMCNC: 33.1 G/DL (ref 31–37)
MCV RBC AUTO: 90.4 FL (ref 74–97)
MCV RBC AUTO: 90.7 FL (ref 74–97)
MCV RBC AUTO: 91.6 FL (ref 74–97)
MCV RBC AUTO: 91.9 FL (ref 74–97)
MCV RBC AUTO: 92 FL (ref 74–97)
MCV RBC AUTO: 92.1 FL (ref 74–97)
MCV RBC AUTO: 92.6 FL (ref 74–97)
MCV RBC AUTO: 94.1 FL (ref 74–97)
MCV RBC AUTO: 95 FL (ref 79–97)
MCV RBC AUTO: 99.3 FL (ref 74–97)
MONOCYTES # BLD AUTO: 0.3 X10E3/UL (ref 0.1–0.9)
MONOCYTES # BLD: 0.2 K/UL (ref 0.05–1.2)
MONOCYTES # BLD: 0.3 K/UL (ref 0.05–1.2)
MONOCYTES # BLD: 0.4 K/UL (ref 0.05–1.2)
MONOCYTES # BLD: 0.5 K/UL (ref 0.05–1.2)
MONOCYTES # BLD: 0.6 K/UL (ref 0.05–1.2)
MONOCYTES # BLD: 0.6 K/UL (ref 0.05–1.2)
MONOCYTES # BLD: 0.7 K/UL (ref 0.05–1.2)
MONOCYTES NFR BLD AUTO: 8 %
MONOCYTES NFR BLD: 10 % (ref 3–10)
MONOCYTES NFR BLD: 12 % (ref 3–10)
MONOCYTES NFR BLD: 3 % (ref 3–10)
MONOCYTES NFR BLD: 5 % (ref 3–10)
MONOCYTES NFR BLD: 7 % (ref 3–10)
MONOCYTES NFR BLD: 9 % (ref 3–10)
MORPHOLOGY BLD-IMP: ABNORMAL
NEUTROPHILS # BLD AUTO: 2.8 X10E3/UL (ref 1.4–7)
NEUTROPHILS NFR BLD AUTO: 74 %
NEUTS SEG # BLD: 3.9 K/UL (ref 1.8–8)
NEUTS SEG # BLD: 3.9 K/UL (ref 1.8–8)
NEUTS SEG # BLD: 4.2 K/UL (ref 1.8–8)
NEUTS SEG # BLD: 4.6 K/UL (ref 1.8–8)
NEUTS SEG # BLD: 4.6 K/UL (ref 1.8–8)
NEUTS SEG # BLD: 5.3 K/UL (ref 1.8–8)
NEUTS SEG # BLD: 5.4 K/UL (ref 1.8–8)
NEUTS SEG # BLD: 5.8 K/UL (ref 1.8–8)
NEUTS SEG # BLD: 6.3 K/UL (ref 1.8–8)
NEUTS SEG NFR BLD: 71 % (ref 40–73)
NEUTS SEG NFR BLD: 71 % (ref 40–73)
NEUTS SEG NFR BLD: 74 % (ref 40–73)
NEUTS SEG NFR BLD: 76 % (ref 40–73)
NEUTS SEG NFR BLD: 77 % (ref 40–73)
NEUTS SEG NFR BLD: 78 % (ref 40–73)
NEUTS SEG NFR BLD: 81 % (ref 40–73)
NEUTS SEG NFR BLD: 84 % (ref 40–73)
NEUTS SEG NFR BLD: 87 % (ref 40–73)
NITRITE UR QL STRIP.AUTO: NEGATIVE
NITRITE UR QL STRIP.AUTO: NEGATIVE
PH UR STRIP: 5.5 [PH] (ref 5–8)
PH UR STRIP: 6 [PH] (ref 5–8)
PHOSPHATE SERPL-MCNC: 4.4 MG/DL (ref 2.5–4.9)
PLATELET # BLD AUTO: 35 K/UL (ref 135–420)
PLATELET # BLD AUTO: 37 K/UL (ref 135–420)
PLATELET # BLD AUTO: 48 K/UL (ref 135–420)
PLATELET # BLD AUTO: 48 K/UL (ref 135–420)
PLATELET # BLD AUTO: 56 X10E3/UL (ref 150–450)
PLATELET # BLD AUTO: 59 K/UL (ref 135–420)
PLATELET # BLD AUTO: 63 K/UL (ref 135–420)
PLATELET # BLD AUTO: 64 K/UL (ref 135–420)
PLATELET # BLD AUTO: 67 K/UL (ref 135–420)
PLATELET # BLD AUTO: 70 K/UL (ref 135–420)
PLATELET COMMENTS,PCOM: ABNORMAL
PMV BLD AUTO: 10.7 FL (ref 9.2–11.8)
PMV BLD AUTO: 11.1 FL (ref 9.2–11.8)
PMV BLD AUTO: 11.3 FL (ref 9.2–11.8)
PMV BLD AUTO: 11.8 FL (ref 9.2–11.8)
PMV BLD AUTO: 11.8 FL (ref 9.2–11.8)
PMV BLD AUTO: 12.5 FL (ref 9.2–11.8)
POTASSIUM SERPL-SCNC: 3.6 MMOL/L (ref 3.5–5.5)
POTASSIUM SERPL-SCNC: 3.7 MMOL/L (ref 3.5–5.5)
POTASSIUM SERPL-SCNC: 3.9 MMOL/L (ref 3.5–5.5)
POTASSIUM SERPL-SCNC: 4 MMOL/L (ref 3.5–5.2)
POTASSIUM SERPL-SCNC: 4.1 MMOL/L (ref 3.5–5.5)
POTASSIUM SERPL-SCNC: 4.2 MMOL/L (ref 3.5–5.5)
POTASSIUM SERPL-SCNC: 4.2 MMOL/L (ref 3.5–5.5)
PROT SERPL-MCNC: 7.3 G/DL (ref 6–8.5)
PROT SERPL-MCNC: 7.6 G/DL (ref 6.4–8.2)
PROT SERPL-MCNC: 7.9 G/DL (ref 6.4–8.2)
PROT SERPL-MCNC: 8.5 G/DL (ref 6.4–8.2)
PROT UR STRIP-MCNC: 100 MG/DL
PROT UR STRIP-MCNC: 30 MG/DL
PROTHROMBIN TIME: 16 SEC (ref 11.5–15.2)
Q-T INTERVAL, ECG07: 500 MS
QRS DURATION, ECG06: 152 MS
QTC CALCULATION (BEZET), ECG08: 540 MS
RBC # BLD AUTO: 3.33 M/UL (ref 4.2–5.3)
RBC # BLD AUTO: 3.47 M/UL (ref 4.2–5.3)
RBC # BLD AUTO: 3.49 M/UL (ref 4.2–5.3)
RBC # BLD AUTO: 3.55 M/UL (ref 4.2–5.3)
RBC # BLD AUTO: 3.63 M/UL (ref 4.2–5.3)
RBC # BLD AUTO: 3.75 X10E6/UL (ref 3.77–5.28)
RBC # BLD AUTO: 3.91 M/UL (ref 4.2–5.3)
RBC # BLD AUTO: 3.95 M/UL (ref 4.2–5.3)
RBC # BLD AUTO: 4.24 M/UL (ref 4.2–5.3)
RBC # BLD AUTO: 4.3 M/UL (ref 4.2–5.3)
RBC #/AREA URNS HPF: ABNORMAL /HPF (ref 0–5)
RBC #/AREA URNS HPF: NEGATIVE /HPF (ref 0–5)
RBC MORPH BLD: ABNORMAL
SODIUM SERPL-SCNC: 139 MMOL/L (ref 136–145)
SODIUM SERPL-SCNC: 140 MMOL/L (ref 136–145)
SODIUM SERPL-SCNC: 143 MMOL/L (ref 136–145)
SODIUM SERPL-SCNC: 144 MMOL/L (ref 134–144)
SODIUM SERPL-SCNC: 144 MMOL/L (ref 136–145)
SODIUM SERPL-SCNC: 146 MMOL/L (ref 136–145)
SODIUM SERPL-SCNC: 159 MMOL/L (ref 136–145)
SP GR UR REFRACTOMETRY: 1.01 (ref 1–1.03)
SP GR UR REFRACTOMETRY: 1.02 (ref 1–1.03)
SPECIMEN EXP DATE BLD: NORMAL
STATUS OF UNIT,%ST: NORMAL
STATUS OF UNIT,%ST: NORMAL
UNIT DIVISION, %UDIV: 0
UNIT DIVISION, %UDIV: 0
UROBILINOGEN UR QL STRIP.AUTO: 1 EU/DL (ref 0.2–1)
UROBILINOGEN UR QL STRIP.AUTO: 1 EU/DL (ref 0.2–1)
VENTRICULAR RATE, ECG03: 70 BPM
WBC # BLD AUTO: 3.9 X10E3/UL (ref 3.4–10.8)
WBC # BLD AUTO: 5.5 K/UL (ref 4.6–13.2)
WBC # BLD AUTO: 5.5 K/UL (ref 4.6–13.2)
WBC # BLD AUTO: 5.6 K/UL (ref 4.6–13.2)
WBC # BLD AUTO: 5.7 K/UL (ref 4.6–13.2)
WBC # BLD AUTO: 6 K/UL (ref 4.6–13.2)
WBC # BLD AUTO: 6.5 K/UL (ref 4.6–13.2)
WBC # BLD AUTO: 7.1 K/UL (ref 4.6–13.2)
WBC # BLD AUTO: 7.2 K/UL (ref 4.6–13.2)
WBC # BLD AUTO: 7.8 K/UL (ref 4.6–13.2)
WBC URNS QL MICRO: ABNORMAL /HPF (ref 0–4)
WBC URNS QL MICRO: ABNORMAL /HPF (ref 0–4)

## 2020-01-01 PROCEDURE — 74011250637 HC RX REV CODE- 250/637: Performed by: HOSPITALIST

## 2020-01-01 PROCEDURE — 74011250637 HC RX REV CODE- 250/637: Performed by: FAMILY MEDICINE

## 2020-01-01 PROCEDURE — 88237 TISSUE CULTURE BONE MARROW: CPT

## 2020-01-01 PROCEDURE — 80053 COMPREHEN METABOLIC PANEL: CPT

## 2020-01-01 PROCEDURE — 36430 TRANSFUSION BLD/BLD COMPNT: CPT

## 2020-01-01 PROCEDURE — 36415 COLL VENOUS BLD VENIPUNCTURE: CPT

## 2020-01-01 PROCEDURE — 85025 COMPLETE CBC W/AUTO DIFF WBC: CPT

## 2020-01-01 PROCEDURE — 99284 EMERGENCY DEPT VISIT MOD MDM: CPT

## 2020-01-01 PROCEDURE — 88185 FLOWCYTOMETRY/TC ADD-ON: CPT

## 2020-01-01 PROCEDURE — 96360 HYDRATION IV INFUSION INIT: CPT

## 2020-01-01 PROCEDURE — 84100 ASSAY OF PHOSPHORUS: CPT

## 2020-01-01 PROCEDURE — 30233R1 TRANSFUSION OF NONAUTOLOGOUS PLATELETS INTO PERIPHERAL VEIN, PERCUTANEOUS APPROACH: ICD-10-PCS | Performed by: FAMILY MEDICINE

## 2020-01-01 PROCEDURE — 73501 X-RAY EXAM HIP UNI 1 VIEW: CPT

## 2020-01-01 PROCEDURE — 85610 PROTHROMBIN TIME: CPT

## 2020-01-01 PROCEDURE — 65270000029 HC RM PRIVATE

## 2020-01-01 PROCEDURE — 80048 BASIC METABOLIC PNL TOTAL CA: CPT

## 2020-01-01 PROCEDURE — 74011000258 HC RX REV CODE- 258: Performed by: ORTHOPAEDIC SURGERY

## 2020-01-01 PROCEDURE — 76010000149 HC OR TIME 1 TO 1.5 HR: Performed by: ORTHOPAEDIC SURGERY

## 2020-01-01 PROCEDURE — 77030027138 HC INCENT SPIROMETER -A

## 2020-01-01 PROCEDURE — 74011000250 HC RX REV CODE- 250: Performed by: ORTHOPAEDIC SURGERY

## 2020-01-01 PROCEDURE — 07DR3ZX EXTRACTION OF ILIAC BONE MARROW, PERCUTANEOUS APPROACH, DIAGNOSTIC: ICD-10-PCS | Performed by: RADIOLOGY

## 2020-01-01 PROCEDURE — 77030003029 HC SUT VCRL J&J -B: Performed by: ORTHOPAEDIC SURGERY

## 2020-01-01 PROCEDURE — 74011250636 HC RX REV CODE- 250/636: Performed by: HOSPITALIST

## 2020-01-01 PROCEDURE — 88342 IMHCHEM/IMCYTCHM 1ST ANTB: CPT

## 2020-01-01 PROCEDURE — P9035 PLATELET PHERES LEUKOREDUCED: HCPCS

## 2020-01-01 PROCEDURE — 76060000033 HC ANESTHESIA 1 TO 1.5 HR: Performed by: ORTHOPAEDIC SURGERY

## 2020-01-01 PROCEDURE — 83735 ASSAY OF MAGNESIUM: CPT

## 2020-01-01 PROCEDURE — 90471 IMMUNIZATION ADMIN: CPT

## 2020-01-01 PROCEDURE — 88313 SPECIAL STAINS GROUP 2: CPT

## 2020-01-01 PROCEDURE — 77030018836 HC SOL IRR NACL ICUM -A: Performed by: ORTHOPAEDIC SURGERY

## 2020-01-01 PROCEDURE — 77010033678 HC OXYGEN DAILY

## 2020-01-01 PROCEDURE — 71045 X-RAY EXAM CHEST 1 VIEW: CPT

## 2020-01-01 PROCEDURE — 74011250636 HC RX REV CODE- 250/636: Performed by: STUDENT IN AN ORGANIZED HEALTH CARE EDUCATION/TRAINING PROGRAM

## 2020-01-01 PROCEDURE — 70450 CT HEAD/BRAIN W/O DYE: CPT

## 2020-01-01 PROCEDURE — 96372 THER/PROPH/DIAG INJ SC/IM: CPT

## 2020-01-01 PROCEDURE — C1713 ANCHOR/SCREW BN/BN,TIS/BN: HCPCS | Performed by: ORTHOPAEDIC SURGERY

## 2020-01-01 PROCEDURE — 51798 US URINE CAPACITY MEASURE: CPT

## 2020-01-01 PROCEDURE — 88311 DECALCIFY TISSUE: CPT

## 2020-01-01 PROCEDURE — 65660000000 HC RM CCU STEPDOWN

## 2020-01-01 PROCEDURE — 73502 X-RAY EXAM HIP UNI 2-3 VIEWS: CPT

## 2020-01-01 PROCEDURE — 81001 URINALYSIS AUTO W/SCOPE: CPT

## 2020-01-01 PROCEDURE — 76210000006 HC OR PH I REC 0.5 TO 1 HR: Performed by: ORTHOPAEDIC SURGERY

## 2020-01-01 PROCEDURE — 85730 THROMBOPLASTIN TIME PARTIAL: CPT

## 2020-01-01 PROCEDURE — 99285 EMERGENCY DEPT VISIT HI MDM: CPT

## 2020-01-01 PROCEDURE — 88184 FLOWCYTOMETRY/ TC 1 MARKER: CPT

## 2020-01-01 PROCEDURE — 74011000250 HC RX REV CODE- 250: Performed by: ANESTHESIOLOGY

## 2020-01-01 PROCEDURE — 90686 IIV4 VACC NO PRSV 0.5 ML IM: CPT | Performed by: FAMILY MEDICINE

## 2020-01-01 PROCEDURE — 74011250636 HC RX REV CODE- 250/636: Performed by: EMERGENCY MEDICINE

## 2020-01-01 PROCEDURE — 74011250636 HC RX REV CODE- 250/636: Performed by: RADIOLOGY

## 2020-01-01 PROCEDURE — 74011250636 HC RX REV CODE- 250/636: Performed by: ANESTHESIOLOGY

## 2020-01-01 PROCEDURE — 93005 ELECTROCARDIOGRAM TRACING: CPT

## 2020-01-01 PROCEDURE — 72125 CT NECK SPINE W/O DYE: CPT

## 2020-01-01 PROCEDURE — 74011000250 HC RX REV CODE- 250: Performed by: RADIOLOGY

## 2020-01-01 PROCEDURE — 88264 CHROMOSOME ANALYSIS 20-25: CPT

## 2020-01-01 PROCEDURE — 74011250636 HC RX REV CODE- 250/636: Performed by: FAMILY MEDICINE

## 2020-01-01 PROCEDURE — 74011000258 HC RX REV CODE- 258: Performed by: FAMILY MEDICINE

## 2020-01-01 PROCEDURE — 77030041247 HC PROTECTOR HEEL HEELMEDIX MDII -B

## 2020-01-01 PROCEDURE — 86901 BLOOD TYPING SEROLOGIC RH(D): CPT

## 2020-01-01 PROCEDURE — 0QH834Z INSERTION OF INTERNAL FIXATION DEVICE INTO RIGHT FEMORAL SHAFT, PERCUTANEOUS APPROACH: ICD-10-PCS | Performed by: ORTHOPAEDIC SURGERY

## 2020-01-01 PROCEDURE — 92610 EVALUATE SWALLOWING FUNCTION: CPT

## 2020-01-01 PROCEDURE — 38221 DX BONE MARROW BIOPSIES: CPT

## 2020-01-01 PROCEDURE — 97530 THERAPEUTIC ACTIVITIES: CPT

## 2020-01-01 PROCEDURE — 77030008462 HC STPLR SKN PROX J&J -A: Performed by: ORTHOPAEDIC SURGERY

## 2020-01-01 PROCEDURE — 88305 TISSUE EXAM BY PATHOLOGIST: CPT

## 2020-01-01 PROCEDURE — 97161 PT EVAL LOW COMPLEX 20 MIN: CPT

## 2020-01-01 PROCEDURE — 97116 GAIT TRAINING THERAPY: CPT

## 2020-01-01 PROCEDURE — 77030031139 HC SUT VCRL2 J&J -A: Performed by: ORTHOPAEDIC SURGERY

## 2020-01-01 PROCEDURE — 97110 THERAPEUTIC EXERCISES: CPT

## 2020-01-01 PROCEDURE — 73700 CT LOWER EXTREMITY W/O DYE: CPT

## 2020-01-01 DEVICE — SCREW BNE L85MM DIA7.3MM THRD L16MM CANC S STL SELF DRL ST: Type: IMPLANTABLE DEVICE | Site: HIP | Status: FUNCTIONAL

## 2020-01-01 DEVICE — SCREW BNE L80MM DIA7.3MM THRD L32MM CANC S STL SELF DRL ST: Type: IMPLANTABLE DEVICE | Site: HIP | Status: FUNCTIONAL

## 2020-01-01 RX ORDER — SODIUM CHLORIDE 0.9 % (FLUSH) 0.9 %
5-40 SYRINGE (ML) INJECTION AS NEEDED
Status: DISCONTINUED | OUTPATIENT
Start: 2020-01-01 | End: 2020-01-01 | Stop reason: HOSPADM

## 2020-01-01 RX ORDER — DIPHENHYDRAMINE HYDROCHLORIDE 50 MG/ML
25 INJECTION, SOLUTION INTRAMUSCULAR; INTRAVENOUS
Status: DISCONTINUED | OUTPATIENT
Start: 2020-01-01 | End: 2020-01-01

## 2020-01-01 RX ORDER — SODIUM CHLORIDE, SODIUM LACTATE, POTASSIUM CHLORIDE, CALCIUM CHLORIDE 600; 310; 30; 20 MG/100ML; MG/100ML; MG/100ML; MG/100ML
75 INJECTION, SOLUTION INTRAVENOUS CONTINUOUS
Status: DISPENSED | OUTPATIENT
Start: 2020-01-01 | End: 2020-01-01

## 2020-01-01 RX ORDER — MORPHINE SULFATE 4 MG/ML
4 INJECTION INTRAVENOUS
Status: DISCONTINUED | OUTPATIENT
Start: 2020-01-01 | End: 2020-01-01

## 2020-01-01 RX ORDER — DIPHENHYDRAMINE HCL 12.5MG/5ML
12.5-25 ELIXIR ORAL
Status: DISCONTINUED | OUTPATIENT
Start: 2020-01-01 | End: 2020-01-01 | Stop reason: HOSPADM

## 2020-01-01 RX ORDER — HYDRALAZINE HYDROCHLORIDE 20 MG/ML
10 INJECTION INTRAMUSCULAR; INTRAVENOUS
Status: DISCONTINUED | OUTPATIENT
Start: 2020-01-01 | End: 2020-01-01 | Stop reason: HOSPADM

## 2020-01-01 RX ORDER — NEOSTIGMINE METHYLSULFATE 1 MG/ML
INJECTION, SOLUTION INTRAVENOUS AS NEEDED
Status: DISCONTINUED | OUTPATIENT
Start: 2020-01-01 | End: 2020-01-01 | Stop reason: HOSPADM

## 2020-01-01 RX ORDER — DEXTROSE, SODIUM CHLORIDE, AND POTASSIUM CHLORIDE 5; .45; .15 G/100ML; G/100ML; G/100ML
75 INJECTION INTRAVENOUS CONTINUOUS
Status: DISCONTINUED | OUTPATIENT
Start: 2020-01-01 | End: 2020-01-01

## 2020-01-01 RX ORDER — CLONIDINE HYDROCHLORIDE 0.1 MG/1
0.1 TABLET ORAL
Status: COMPLETED | OUTPATIENT
Start: 2020-01-01 | End: 2020-01-01

## 2020-01-01 RX ORDER — ACETAMINOPHEN 650 MG/1
650 SUPPOSITORY RECTAL
Status: DISCONTINUED | OUTPATIENT
Start: 2020-01-01 | End: 2020-01-01 | Stop reason: HOSPADM

## 2020-01-01 RX ORDER — DEXTROSE MONOHYDRATE AND SODIUM CHLORIDE 5; .45 G/100ML; G/100ML
75 INJECTION, SOLUTION INTRAVENOUS CONTINUOUS
Status: DISCONTINUED | OUTPATIENT
Start: 2020-01-01 | End: 2020-01-01 | Stop reason: HOSPADM

## 2020-01-01 RX ORDER — AMLODIPINE BESYLATE 10 MG/1
10 TABLET ORAL DAILY
Qty: 30 TAB | Refills: 0 | Status: SHIPPED
Start: 2020-01-01

## 2020-01-01 RX ORDER — LIDOCAINE HYDROCHLORIDE 20 MG/ML
INJECTION, SOLUTION EPIDURAL; INFILTRATION; INTRACAUDAL; PERINEURAL AS NEEDED
Status: DISCONTINUED | OUTPATIENT
Start: 2020-01-01 | End: 2020-01-01 | Stop reason: HOSPADM

## 2020-01-01 RX ORDER — SODIUM CHLORIDE 9 MG/ML
250 INJECTION, SOLUTION INTRAVENOUS AS NEEDED
Status: DISCONTINUED | OUTPATIENT
Start: 2020-01-01 | End: 2020-01-01

## 2020-01-01 RX ORDER — AMLODIPINE BESYLATE 10 MG/1
10 TABLET ORAL DAILY
Status: DISCONTINUED | OUTPATIENT
Start: 2020-01-01 | End: 2020-01-01 | Stop reason: HOSPADM

## 2020-01-01 RX ORDER — LIDOCAINE HYDROCHLORIDE 10 MG/ML
30 INJECTION, SOLUTION EPIDURAL; INFILTRATION; INTRACAUDAL; PERINEURAL ONCE
Status: COMPLETED | OUTPATIENT
Start: 2020-01-01 | End: 2020-01-01

## 2020-01-01 RX ORDER — METOPROLOL TARTRATE 50 MG/1
50 TABLET ORAL DAILY
Status: DISCONTINUED | OUTPATIENT
Start: 2020-01-01 | End: 2020-01-01 | Stop reason: HOSPADM

## 2020-01-01 RX ORDER — HYDROCODONE BITARTRATE AND ACETAMINOPHEN 5; 325 MG/1; MG/1
1 TABLET ORAL
Qty: 6 TAB | Refills: 0 | Status: SHIPPED | OUTPATIENT
Start: 2020-01-01 | End: 2020-01-01

## 2020-01-01 RX ORDER — MORPHINE SULFATE 4 MG/ML
4 INJECTION, SOLUTION INTRAMUSCULAR; INTRAVENOUS
Status: COMPLETED | OUTPATIENT
Start: 2020-01-01 | End: 2020-01-01

## 2020-01-01 RX ORDER — DOCUSATE SODIUM 100 MG/1
100 CAPSULE, LIQUID FILLED ORAL 2 TIMES DAILY
Status: DISCONTINUED | OUTPATIENT
Start: 2020-01-01 | End: 2020-01-01 | Stop reason: HOSPADM

## 2020-01-01 RX ORDER — FENTANYL CITRATE 50 UG/ML
25 INJECTION, SOLUTION INTRAMUSCULAR; INTRAVENOUS AS NEEDED
Status: DISCONTINUED | OUTPATIENT
Start: 2020-01-01 | End: 2020-01-01 | Stop reason: HOSPADM

## 2020-01-01 RX ORDER — MIDAZOLAM HYDROCHLORIDE 1 MG/ML
1 INJECTION, SOLUTION INTRAMUSCULAR; INTRAVENOUS
Status: DISCONTINUED | OUTPATIENT
Start: 2020-01-01 | End: 2020-01-01 | Stop reason: ALTCHOICE

## 2020-01-01 RX ORDER — LOSARTAN POTASSIUM 50 MG/1
50 TABLET ORAL ONCE
Status: COMPLETED | OUTPATIENT
Start: 2020-01-01 | End: 2020-01-01

## 2020-01-01 RX ORDER — FENTANYL CITRATE 50 UG/ML
50 INJECTION, SOLUTION INTRAMUSCULAR; INTRAVENOUS
Status: DISCONTINUED | OUTPATIENT
Start: 2020-01-01 | End: 2020-01-01 | Stop reason: HOSPADM

## 2020-01-01 RX ORDER — AMLODIPINE BESYLATE 5 MG/1
5 TABLET ORAL ONCE
Status: COMPLETED | OUTPATIENT
Start: 2020-01-01 | End: 2020-01-01

## 2020-01-01 RX ORDER — AMLODIPINE BESYLATE 5 MG/1
5 TABLET ORAL DAILY
Status: DISCONTINUED | OUTPATIENT
Start: 2020-01-01 | End: 2020-01-01

## 2020-01-01 RX ORDER — FENTANYL CITRATE 50 UG/ML
12.5-5 INJECTION, SOLUTION INTRAMUSCULAR; INTRAVENOUS
Status: DISCONTINUED | OUTPATIENT
Start: 2020-01-01 | End: 2020-01-01 | Stop reason: ALTCHOICE

## 2020-01-01 RX ORDER — PROPOFOL 10 MG/ML
INJECTION, EMULSION INTRAVENOUS AS NEEDED
Status: DISCONTINUED | OUTPATIENT
Start: 2020-01-01 | End: 2020-01-01 | Stop reason: HOSPADM

## 2020-01-01 RX ORDER — MORPHINE SULFATE 2 MG/ML
2 INJECTION, SOLUTION INTRAMUSCULAR; INTRAVENOUS
Status: DISCONTINUED | OUTPATIENT
Start: 2020-01-01 | End: 2020-01-01 | Stop reason: HOSPADM

## 2020-01-01 RX ORDER — FACIAL-BODY WIPES
10 EACH TOPICAL DAILY PRN
Status: DISCONTINUED | OUTPATIENT
Start: 2020-01-01 | End: 2020-01-01 | Stop reason: HOSPADM

## 2020-01-01 RX ORDER — SODIUM CHLORIDE 0.9 % (FLUSH) 0.9 %
5-40 SYRINGE (ML) INJECTION EVERY 8 HOURS
Status: DISCONTINUED | OUTPATIENT
Start: 2020-01-01 | End: 2020-01-01 | Stop reason: HOSPADM

## 2020-01-01 RX ORDER — POLYETHYLENE GLYCOL 3350 17 G/17G
17 POWDER, FOR SOLUTION ORAL DAILY
Status: DISCONTINUED | OUTPATIENT
Start: 2020-01-01 | End: 2020-01-01

## 2020-01-01 RX ORDER — LOSARTAN POTASSIUM 50 MG/1
100 TABLET ORAL DAILY
Status: DISCONTINUED | OUTPATIENT
Start: 2020-01-01 | End: 2020-01-01 | Stop reason: HOSPADM

## 2020-01-01 RX ORDER — DEXAMETHASONE SODIUM PHOSPHATE 4 MG/ML
4 INJECTION, SOLUTION INTRA-ARTICULAR; INTRALESIONAL; INTRAMUSCULAR; INTRAVENOUS; SOFT TISSUE AS NEEDED
Status: DISCONTINUED | OUTPATIENT
Start: 2020-01-01 | End: 2020-01-01 | Stop reason: HOSPADM

## 2020-01-01 RX ORDER — HYDRALAZINE HYDROCHLORIDE 20 MG/ML
10 INJECTION INTRAMUSCULAR; INTRAVENOUS ONCE
Status: COMPLETED | OUTPATIENT
Start: 2020-01-01 | End: 2020-01-01

## 2020-01-01 RX ORDER — GLYCOPYRROLATE 0.2 MG/ML
INJECTION INTRAMUSCULAR; INTRAVENOUS AS NEEDED
Status: DISCONTINUED | OUTPATIENT
Start: 2020-01-01 | End: 2020-01-01 | Stop reason: HOSPADM

## 2020-01-01 RX ORDER — HEPARIN SODIUM 5000 [USP'U]/ML
5000 INJECTION, SOLUTION INTRAVENOUS; SUBCUTANEOUS EVERY 8 HOURS
Status: DISCONTINUED | OUTPATIENT
Start: 2020-01-01 | End: 2020-01-01

## 2020-01-01 RX ORDER — ONDANSETRON 2 MG/ML
INJECTION INTRAMUSCULAR; INTRAVENOUS AS NEEDED
Status: DISCONTINUED | OUTPATIENT
Start: 2020-01-01 | End: 2020-01-01 | Stop reason: HOSPADM

## 2020-01-01 RX ORDER — NALOXONE HYDROCHLORIDE 0.4 MG/ML
0.4 INJECTION, SOLUTION INTRAMUSCULAR; INTRAVENOUS; SUBCUTANEOUS
Status: DISCONTINUED | OUTPATIENT
Start: 2020-01-01 | End: 2020-01-01 | Stop reason: HOSPADM

## 2020-01-01 RX ORDER — ONDANSETRON 2 MG/ML
4 INJECTION INTRAMUSCULAR; INTRAVENOUS
Status: DISCONTINUED | OUTPATIENT
Start: 2020-01-01 | End: 2020-01-01 | Stop reason: HOSPADM

## 2020-01-01 RX ORDER — LOSARTAN POTASSIUM 100 MG/1
TABLET ORAL
Qty: 30 TAB | Refills: 1 | Status: SHIPPED
Start: 2020-01-01

## 2020-01-01 RX ORDER — LOSARTAN POTASSIUM 50 MG/1
50 TABLET ORAL DAILY
Status: DISCONTINUED | OUTPATIENT
Start: 2020-01-01 | End: 2020-01-01

## 2020-01-01 RX ORDER — EPHEDRINE SULFATE/0.9% NACL/PF 50 MG/5 ML
SYRINGE (ML) INTRAVENOUS AS NEEDED
Status: DISCONTINUED | OUTPATIENT
Start: 2020-01-01 | End: 2020-01-01 | Stop reason: HOSPADM

## 2020-01-01 RX ORDER — METOPROLOL TARTRATE 50 MG/1
TABLET ORAL
Qty: 90 TAB | Refills: 1 | Status: SHIPPED | OUTPATIENT
Start: 2020-01-01

## 2020-01-01 RX ORDER — ROCURONIUM BROMIDE 10 MG/ML
INJECTION, SOLUTION INTRAVENOUS AS NEEDED
Status: DISCONTINUED | OUTPATIENT
Start: 2020-01-01 | End: 2020-01-01 | Stop reason: HOSPADM

## 2020-01-01 RX ADMIN — DOCUSATE SODIUM 100 MG: 100 CAPSULE, LIQUID FILLED ORAL at 17:50

## 2020-01-01 RX ADMIN — HYDRALAZINE HYDROCHLORIDE 10 MG: 20 INJECTION INTRAMUSCULAR; INTRAVENOUS at 10:55

## 2020-01-01 RX ADMIN — DOCUSATE SODIUM 100 MG: 100 CAPSULE, LIQUID FILLED ORAL at 08:58

## 2020-01-01 RX ADMIN — DIPHENHYDRAMINE HYDROCHLORIDE 12.5 MG: 25 SOLUTION ORAL at 15:58

## 2020-01-01 RX ADMIN — SODIUM CHLORIDE 600 MG: 0.9 INJECTION INTRAVENOUS at 15:10

## 2020-01-01 RX ADMIN — METOPROLOL TARTRATE 50 MG: 50 TABLET, FILM COATED ORAL at 08:41

## 2020-01-01 RX ADMIN — CLONIDINE HYDROCHLORIDE 0.1 MG: 0.1 TABLET ORAL at 05:46

## 2020-01-01 RX ADMIN — DIPHENHYDRAMINE HYDROCHLORIDE 25 MG: 50 INJECTION, SOLUTION INTRAMUSCULAR; INTRAVENOUS at 00:22

## 2020-01-01 RX ADMIN — ONDANSETRON 4 MG: 2 INJECTION INTRAMUSCULAR; INTRAVENOUS at 03:55

## 2020-01-01 RX ADMIN — SODIUM CHLORIDE 500 ML: 900 INJECTION, SOLUTION INTRAVENOUS at 17:21

## 2020-01-01 RX ADMIN — SODIUM CHLORIDE 600 MG: 0.9 INJECTION INTRAVENOUS at 06:31

## 2020-01-01 RX ADMIN — MORPHINE SULFATE 2 MG: 2 INJECTION, SOLUTION INTRAMUSCULAR; INTRAVENOUS at 08:10

## 2020-01-01 RX ADMIN — LOSARTAN POTASSIUM 50 MG: 50 TABLET, FILM COATED ORAL at 08:10

## 2020-01-01 RX ADMIN — POLYETHYLENE GLYCOL 3350 17 G: 17 POWDER, FOR SOLUTION ORAL at 08:10

## 2020-01-01 RX ADMIN — SODIUM CHLORIDE, SODIUM LACTATE, POTASSIUM CHLORIDE, AND CALCIUM CHLORIDE 75 ML/HR: 600; 310; 30; 20 INJECTION, SOLUTION INTRAVENOUS at 22:48

## 2020-01-01 RX ADMIN — METOPROLOL TARTRATE 50 MG: 50 TABLET, FILM COATED ORAL at 09:03

## 2020-01-01 RX ADMIN — LOSARTAN POTASSIUM 50 MG: 50 TABLET, FILM COATED ORAL at 09:02

## 2020-01-01 RX ADMIN — ONDANSETRON 4 MG: 2 INJECTION INTRAMUSCULAR; INTRAVENOUS at 15:36

## 2020-01-01 RX ADMIN — METOPROLOL TARTRATE 50 MG: 50 TABLET, FILM COATED ORAL at 08:58

## 2020-01-01 RX ADMIN — SODIUM CHLORIDE 1000 ML: 900 INJECTION, SOLUTION INTRAVENOUS at 16:58

## 2020-01-01 RX ADMIN — DIPHENHYDRAMINE HYDROCHLORIDE 25 MG: 50 INJECTION, SOLUTION INTRAMUSCULAR; INTRAVENOUS at 16:54

## 2020-01-01 RX ADMIN — CLONIDINE HYDROCHLORIDE 0.1 MG: 0.1 TABLET ORAL at 22:24

## 2020-01-01 RX ADMIN — DIPHENHYDRAMINE HYDROCHLORIDE 25 MG: 50 INJECTION, SOLUTION INTRAMUSCULAR; INTRAVENOUS at 14:34

## 2020-01-01 RX ADMIN — METOPROLOL TARTRATE 50 MG: 50 TABLET, FILM COATED ORAL at 12:04

## 2020-01-01 RX ADMIN — LIDOCAINE HYDROCHLORIDE 40 MG: 20 INJECTION, SOLUTION EPIDURAL; INFILTRATION; INTRACAUDAL; PERINEURAL at 15:08

## 2020-01-01 RX ADMIN — INFLUENZA VIRUS VACCINE 0.5 ML: 15; 15; 15; 15 SUSPENSION INTRAMUSCULAR at 15:55

## 2020-01-01 RX ADMIN — Medication 3 MG: at 15:52

## 2020-01-01 RX ADMIN — HYDRALAZINE HYDROCHLORIDE 10 MG: 20 INJECTION INTRAMUSCULAR; INTRAVENOUS at 00:04

## 2020-01-01 RX ADMIN — ROCURONIUM BROMIDE 40 MG: 10 INJECTION, SOLUTION INTRAVENOUS at 15:08

## 2020-01-01 RX ADMIN — HYDRALAZINE HYDROCHLORIDE 10 MG: 20 INJECTION INTRAMUSCULAR; INTRAVENOUS at 00:20

## 2020-01-01 RX ADMIN — AMLODIPINE BESYLATE 5 MG: 5 TABLET ORAL at 16:20

## 2020-01-01 RX ADMIN — AMLODIPINE BESYLATE 10 MG: 10 TABLET ORAL at 12:05

## 2020-01-01 RX ADMIN — Medication 10 MG: at 15:10

## 2020-01-01 RX ADMIN — PROPOFOL 100 MG: 10 INJECTION, EMULSION INTRAVENOUS at 15:08

## 2020-01-01 RX ADMIN — SODIUM CHLORIDE 900 MG: 9 INJECTION, SOLUTION INTRAVENOUS at 15:11

## 2020-01-01 RX ADMIN — MORPHINE SULFATE 4 MG: 4 INJECTION, SOLUTION INTRAMUSCULAR; INTRAVENOUS at 02:36

## 2020-01-01 RX ADMIN — ONDANSETRON 4 MG: 2 INJECTION INTRAMUSCULAR; INTRAVENOUS at 16:27

## 2020-01-01 RX ADMIN — MORPHINE SULFATE 2 MG: 2 INJECTION, SOLUTION INTRAMUSCULAR; INTRAVENOUS at 09:10

## 2020-01-01 RX ADMIN — DOCUSATE SODIUM 100 MG: 100 CAPSULE, LIQUID FILLED ORAL at 08:27

## 2020-01-01 RX ADMIN — LOSARTAN POTASSIUM 50 MG: 50 TABLET, FILM COATED ORAL at 08:58

## 2020-01-01 RX ADMIN — LIDOCAINE HYDROCHLORIDE 30 ML: 10 INJECTION, SOLUTION EPIDURAL; INFILTRATION; INTRACAUDAL; PERINEURAL at 09:30

## 2020-01-01 RX ADMIN — GLYCOPYRROLATE 0.2 MG: 0.2 INJECTION INTRAMUSCULAR; INTRAVENOUS at 15:52

## 2020-01-01 RX ADMIN — SODIUM CHLORIDE, SODIUM LACTATE, POTASSIUM CHLORIDE, AND CALCIUM CHLORIDE 75 ML/HR: 600; 310; 30; 20 INJECTION, SOLUTION INTRAVENOUS at 07:00

## 2020-01-01 RX ADMIN — LOSARTAN POTASSIUM 100 MG: 50 TABLET, FILM COATED ORAL at 12:04

## 2020-01-01 RX ADMIN — DOCUSATE SODIUM 100 MG: 100 CAPSULE, LIQUID FILLED ORAL at 19:34

## 2020-01-01 RX ADMIN — DIPHENHYDRAMINE HYDROCHLORIDE 25 MG: 50 INJECTION, SOLUTION INTRAMUSCULAR; INTRAVENOUS at 02:03

## 2020-01-01 RX ADMIN — DOCUSATE SODIUM 100 MG: 100 CAPSULE, LIQUID FILLED ORAL at 09:46

## 2020-01-01 RX ADMIN — HYDRALAZINE HYDROCHLORIDE 10 MG: 20 INJECTION INTRAMUSCULAR; INTRAVENOUS at 05:33

## 2020-01-01 RX ADMIN — SODIUM CHLORIDE 600 MG: 0.9 INJECTION INTRAVENOUS at 22:43

## 2020-01-01 RX ADMIN — HYDRALAZINE HYDROCHLORIDE 10 MG: 20 INJECTION INTRAMUSCULAR; INTRAVENOUS at 00:01

## 2020-01-01 RX ADMIN — LOSARTAN POTASSIUM 50 MG: 50 TABLET, FILM COATED ORAL at 11:57

## 2020-01-01 RX ADMIN — DOCUSATE SODIUM 100 MG: 100 CAPSULE, LIQUID FILLED ORAL at 09:02

## 2020-01-01 RX ADMIN — LOSARTAN POTASSIUM 50 MG: 50 TABLET, FILM COATED ORAL at 08:27

## 2020-01-01 RX ADMIN — Medication 10 MG: at 15:32

## 2020-01-01 RX ADMIN — ACETAMINOPHEN 650 MG: 650 SUPPOSITORY RECTAL at 00:29

## 2020-01-01 RX ADMIN — METOPROLOL TARTRATE 50 MG: 50 TABLET, FILM COATED ORAL at 08:10

## 2020-01-01 RX ADMIN — AMLODIPINE BESYLATE 5 MG: 5 TABLET ORAL at 09:45

## 2020-01-01 RX ADMIN — METOPROLOL TARTRATE 50 MG: 50 TABLET, FILM COATED ORAL at 09:46

## 2020-01-01 RX ADMIN — CLONIDINE HYDROCHLORIDE 0.1 MG: 0.1 TABLET ORAL at 02:53

## 2020-01-01 RX ADMIN — FENTANYL CITRATE 25 MCG: 50 INJECTION INTRAMUSCULAR; INTRAVENOUS at 09:30

## 2020-01-01 RX ADMIN — METOPROLOL TARTRATE 50 MG: 50 TABLET, FILM COATED ORAL at 08:27

## 2020-01-01 RX ADMIN — LOSARTAN POTASSIUM 100 MG: 50 TABLET, FILM COATED ORAL at 09:45

## 2020-01-01 RX ADMIN — DEXTROSE MONOHYDRATE AND SODIUM CHLORIDE 75 ML/HR: 5; .45 INJECTION, SOLUTION INTRAVENOUS at 14:27

## 2020-01-01 RX ADMIN — CLONIDINE HYDROCHLORIDE 0.1 MG: 0.1 TABLET ORAL at 00:29

## 2020-02-12 NOTE — PROGRESS NOTES
Pt is here for referral for home care. Daughter states Pt has refusing to take medications most of the time. 1. Have you been to the ER, urgent care clinic since your last visit? Hospitalized since your last visit? No    2. Have you seen or consulted any other health care providers outside of the [address] [place] [address] since your last visit? Include any pap smears or colon screening.  No

## 2020-02-12 NOTE — PROGRESS NOTES
HISTORY OF PRESENT ILLNESS  Alvin Bond is a 80 y.o. female. Patient presents regarding home care. Patient is with her daughter's and in a wheelchair. HPI  Patient is not taking her medications. Pt gives her daughters a hard time giving them to her. She will spit them out. A nurse is coming on Monday: This is been for about 3 monthss now. She comes once a month. Juan C Andre. Both daughters today are interested in getting their mother in to a nursing home. Just one of the daughters is the main care giver and she is with her 24 hours a day. It is getting harder for her to do this alone. Review of Systems   Constitutional: Negative. Respiratory: Negative. Cardiovascular: Negative. Visit Vitals  BP (!) 184/98 (BP 1 Location: Left arm)   Pulse 68   Temp 97.6 °F (36.4 °C) (Axillary)   Resp 16   SpO2 99%       Physical Exam  Constitutional:       General: She is not in acute distress. Appearance: She is not ill-appearing. Cardiovascular:      Rate and Rhythm: Normal rate and regular rhythm. Heart sounds: No murmur. Pulmonary:      Effort: Pulmonary effort is normal. No respiratory distress. Breath sounds: Normal breath sounds. No stridor. No wheezing. Musculoskeletal:      Right lower leg: No edema. Left lower leg: No edema. ASSESSMENT and PLAN    ICD-10-CM ICD-9-CM    1. Essential hypertension I10 401.9    2. Other iron deficiency anemia D50.8 280.8    3. PSVT (paroxysmal supraventricular tachycardia) ([facility]) I47.1 427.0    4. Acute right-sided weakness R53.1 728.87    5. Pancytopenia ([address] D61.818 284.19    6. Combined systolic and diastolic heart failure, unspecified HF chronicity ([facility]) I50.40 428.40    7. Thrombocytopenia ([facility]) D69.6 287.5         PLAN:  Hold the losartan  Hold the Norvasc    I agree with Nursing Home Placement. The daughters will try to find a place that is appropriate for their mother.   I will complete forms that may need to be done.  Her daughters are aware that she may need a PPD placement. I have discussed the diagnosis with the patient and the intended plan as seen in the above orders. The patient has received an after-visit summary and questions were answered concerning future plans. I have discussed medication side effects and warnings with the patient as well. Patient will call for further questions. Pt may need a visit for Nursing Home Placement.

## 2020-02-14 PROBLEM — F03.90 DEMENTIA (HCC): Chronic | Status: ACTIVE | Noted: 2020-01-01

## 2020-02-14 PROBLEM — Z95.0 HISTORY OF CARDIAC PACEMAKER: Chronic | Status: ACTIVE | Noted: 2020-01-01

## 2020-02-14 PROBLEM — S72.001A CLOSED RIGHT HIP FRACTURE (HCC): Status: ACTIVE | Noted: 2020-01-01

## 2020-02-14 PROBLEM — D47.2 MONOCLONAL GAMMOPATHY OF UNDETERMINED SIGNIFICANCE: Chronic | Status: ACTIVE | Noted: 2018-06-05

## 2020-02-14 NOTE — ANESTHESIA PREPROCEDURE EVALUATION
Relevant Problems No relevant active problems Anesthetic History Review of Systems / Medical History Pulmonary Neuro/Psych Cardiovascular Hypertension Dysrhythmias CAD 
 
 
  
GI/Hepatic/Renal 
  
GERD Endo/Other Other Findings Physical Exam 
 
Airway Mallampati: II 
TM Distance: 4 - 6 cm Neck ROM: normal range of motion Mouth opening: Normal 
 
 Cardiovascular Regular rate and rhythm,  S1 and S2 normal,  no murmur, click, rub, or gallop Rhythm: regular Rate: normal 
 
 
 
 Dental 
 
Dentition: Full upper dentures and Full lower dentures Pulmonary Breath sounds clear to auscultation Abdominal 
GI exam deferred Other Findings Anesthetic Plan ASA: 3 Anesthesia type: general 
 
 
 
 
Induction: Intravenous Anesthetic plan and risks discussed with: Patient and Family

## 2020-02-14 NOTE — PROGRESS NOTES
TRANSFER - IN REPORT: 
 
Verbal report received from Janet ANTONIO(name) on [facility]  being received from ED(unit) for ordered procedure Report consisted of patients Situation, Background, Assessment and  
Recommendations(SBAR). Information from the following report(s) SBAR, Kardex, [facility] and Recent Results was reviewed with the receiving nurse. Opportunity for questions and clarification was provided. Assessment completed upon patients arrival to unit and care assumed.

## 2020-02-14 NOTE — PROGRESS NOTES
attempted to visit patient and was not able to do a spiritual assessment. Patient was not available. Will follow up with patient as needed. [address] [facility] Care Department [phone]

## 2020-02-14 NOTE — ED PROVIDER NOTES
EMERGENCY DEPARTMENT HISTORY AND PHYSICAL EXAM 
 
12:01 AM 
 
 
Date: 2/13/2020 Patient Name: Pallavi Antonio History of Presenting Illness No chief complaint on file. History Provided By: Patient Additional History (Context): Pallavi Antonio is a 80 y.o. female with history of multiple medical problems who presents with accidental fall at home this morning. Her daughter states that she heard her fall in another room, went immediately and found her lying on the floor unable to get up. She states she was awake, does not believe she had any loss of consciousness. Patient does not clearly recall what happened and history is limited from her. She does not complain of any pain at this time. No recent illness or complaints prior to the fall. PCP: Xenia Christie NP Current Outpatient Medications Medication Sig Dispense Refill  metoprolol tartrate (LOPRESSOR) 50 mg tablet TAKE ONE TABLET BY MOUTH ONCE DAILY 90 Tab 1  
 furosemide (LASIX) 20 mg tablet TAKE 1 TABLET BY MOUTH ONCE DAILY 90 Tab 1  
 losartan (COZAAR) 50 mg tablet TAKE 1 TABLET BY MOUTH TWICE DAILY 180 Tab 1  clobetasol (TEMOVATE) 0.05 % topical cream Apply  to affected area two (2) times a day. 15 g 0  
 mupirocin (BACTROBAN) 2 % ointment Apply  to affected area daily. 22 g 0  
 IRON, FERROUS SULFATE, 325 mg (65 mg iron) tablet TAKE 1 TABLET BY MOUTH ONCE DAILY BEFORE  BREAKFAST 180 Tab 0  
 polyethylene glycol (MIRALAX) 17 gram/dose powder Take 17 g by mouth daily. 527 g 1  
 senna-docusate (PERICOLACE) 8.6-50 mg per tablet Take 1 tablet by mouth daily. 30 tablet 0 Past History Past Medical History: 
Past Medical History:  
Diagnosis Date  Anemia 1/11/2010  
 thrombocytopenia  Anxiety 1/11/2010  Atrial fibrillation ([address] 1/11/2010  Atrial flutter ([facility] [address] recurrent  CAD (coronary artery disease) 8/3/2010  Cataract 1/11/2010  Coronary atherosclerosis of native coronary artery  CVA (cerebral infarction)  Depression 3/18/2014  Diverticulitis 11/19/2014  DVT (deep venous thrombosis) ([address] 1/1/2007  Dysarthria  Essential hypertension, benign  GERD (gastroesophageal reflux disease) 1/11/2010  
 History of blood transfusion 2/16/2016  HLD (hyperlipidemia) 1/11/2010  
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 
Past Surgical History:  
Procedure Laterality Date  CARDIAC SURG PROCEDURE UNLIST    
 ablation  HX CATARACT REMOVAL    
 bilateral  
 HX CHOLECYSTECTOMY  HX OTHER SURGICAL    
 IVC filter  HX PACEMAKER    
 IR IVC FILTER PLACEMENT PERCUTANEOUS  2007 Family History: 
Family History Problem Relation Age of Onset Wanda Solorzano Arthritis-osteo Mother  Hypertension Mother  Arthritis-osteo Father  Coronary Artery Disease Neg Hx Social History: 
Social History Tobacco Use  Smoking status: Never Smoker  Smokeless tobacco: Never Used Substance Use Topics  Alcohol use: No  
  Alcohol/week: 0.0 standard drinks  Drug use: No  
 
 
Allergies: Allergies Allergen Reactions  Pcn [Penicillins] Hives Review of Systems Review of Systems Unable to perform ROS: Dementia Physical Exam  
 
Visit Vitals BP (!) 192/99 Pulse 70 SpO2 100% Physical Exam 
Vitals signs and nursing note reviewed. Constitutional:   
   Appearance: She is well-developed. HENT:  
   Head: Normocephalic and atraumatic.   
Eyes:  
   Conjunctiva/sclera: Conjunctivae normal.  
 Neck: Musculoskeletal: Normal range of motion and neck supple. Vascular: No JVD. Cardiovascular:  
   Rate and Rhythm: Normal rate and regular rhythm. Pulses: Normal pulses. Heart sounds: Normal heart sounds. No murmur. Pulmonary:  
   Effort: Pulmonary effort is normal.  
   Breath sounds: Normal breath sounds. Abdominal:  
   General: Bowel sounds are normal. There is no distension. Palpations: Abdomen is soft. Tenderness: There is no abdominal tenderness. Musculoskeletal:     
   General: No deformity. Comments: No deformities noted, patient does seem to have pain in the right hip with range of motion. All other extremities with full range of motion without pain. Lymphadenopathy:  
   Cervical: No cervical adenopathy. Skin: 
   General: Skin is warm and dry. Findings: No rash. Neurological:  
   Mental Status: She is alert. Coordination: Coordination normal.  
   Comments: Awake and alert, responsive Diagnostic Study Results Labs - Recent Results (from the past 12 hour(s)) EKG, 12 LEAD, INITIAL Collection Time: 02/14/20  1:49 AM  
Result Value Ref Range Ventricular Rate 70 BPM  
 Atrial Rate 267 BPM  
 QRS Duration 152 ms Q-T Interval 500 ms QTC Calculation (Bezet) 540 ms Calculated R Axis -59 degrees Calculated T Axis 128 degrees Diagnosis Ventricular-paced rhythm Abnormal ECG When compared with ECG of 20-SEP-2019 11:21, No significant change was found CBC WITH AUTOMATED DIFF Collection Time: 02/14/20  2:25 AM  
Result Value Ref Range WBC 7.2 4.6 - 13.2 K/uL  
 RBC 4.30 4.20 - 5.30 M/uL  
 HGB 12.8 12.0 - 16.0 g/dL HCT 39.0 35.0 - 45.0 % MCV 90.7 74.0 - 97.0 FL  
 MCH 29.8 24.0 - 34.0 PG  
 MCHC 32.8 31.0 - 37.0 g/dL  
 RDW 15.9 (H) 11.6 - 14.5 % PLATELET 35 (L) 236 - 420 K/uL NEUTROPHILS PENDING % LYMPHOCYTES PENDING % MONOCYTES PENDING % EOSINOPHILS PENDING % BASOPHILS PENDING %  
 ABS. NEUTROPHILS PENDING K/UL  
 ABS. LYMPHOCYTES PENDING K/UL  
 ABS. MONOCYTES PENDING K/UL  
 ABS. EOSINOPHILS PENDING K/UL  
 ABS. BASOPHILS PENDING K/UL  
 DF PENDING   
METABOLIC PANEL, COMPREHENSIVE Collection Time: 02/14/20  2:25 AM  
Result Value Ref Range Sodium 140 136 - 145 mmol/L Potassium 4.2 3.5 - 5.5 mmol/L Chloride 106 100 - 111 mmol/L  
 CO2 26 21 - 32 mmol/L Anion gap 8 3.0 - 18 mmol/L Glucose 116 (H) 74 - 99 mg/dL BUN 11 7.0 - 18 MG/DL Creatinine 0.94 0.6 - 1.3 MG/DL  
 BUN/Creatinine ratio 12 12 - 20 GFR est AA >60 >60 ml/min/1.73m2 GFR est non-AA 56 (L) >60 ml/min/1.73m2 Calcium 8.9 8.5 - 10.1 MG/DL Bilirubin, total PENDING MG/DL  
 ALT (SGPT) PENDING U/L  
 AST (SGOT) PENDING U/L Alk. phosphatase PENDING U/L Protein, total PENDING g/dL Albumin 3.5 3.4 - 5.0 g/dL Globulin PENDING g/dL A-G Ratio PENDING    
TYPE & SCREEN Collection Time: 02/14/20  2:25 AM  
Result Value Ref Range Crossmatch Expiration 02/17/2020 ABO/Rh(D) PENDING Antibody screen PENDING Radiologic Studies -  
CT HEAD WO CONT Final Result IMPRESSION:  
1. No acute intracranial process identified. 2.  Moderate parenchymal volume loss and chronic small vessel ischemic changes. CT SPINE CERV WO CONT Final Result IMPRESSION:  
1. No acute cervical spine fracture 2. Multilevel degenerative disc disease, facet arthropathy and anterolisthesis  
of C7 on T1.  
  
3.  Right-sided pleural effusion. 4.  Interstitial thickening of the lung apices. CT LOW EXT RT WO CONT Final Result IMPRESSION:   
1. Impacted right hip fracture. XR HIP RT W OR WO PELV  1 VW    (Results Pending) XR CHEST PORT    (Results Pending) Medical Decision Making I am the first provider for this patient.  
 
I reviewed the vital signs, available nursing notes, past medical history, past surgical history, family history and social history. Vital Signs-Reviewed the patient's vital signs. EKG: 
paced, rate 70, W[place] -, QTc 540. No acute ST or T wave changes, no STEMI. Records Reviewed: Nursing Notes (Time of Review: 12:01 AM) Provider Notes (Medical Decision Making): Trip Awad is a 80 y.o. female with history of multiple medical problems who presents with accidental fall at home this morning. Her daughter states that she heard her fall in another room, went immediately and found her lying on the floor unable to get up. She states she was awake, does not believe she had any loss of consciousness. Patient does not clearly recall what happened and history is limited from her. She does not complain of any pain at this time. No recent illness or complaints prior to the fall. Patient appears well, does have some pain with range of motion of the right hip. Differential Diagnosis: Likely mechanical fall, will evaluate for any potential injury. Lower suspicion for syncopal or near syncopal episode. Testing: CBC, CMP, urinalysis, EKG, CT brain and C-spine, x-ray right hip and pelvis Treatments: Pending evaluation Re-evaluations: 
Patient with impacted right hip fracture, other studies unremarkable. Patient admitted to hospitalist service, with orthopedic surgery consulting. Procedures: Indication: unable to get IV access/blood Skin Preparation: Hand hygiene performed prior to venous catheter insertion. The area was cleansed and prepped with chloraprep. Procedure: 20 gauge IV placed in right upper arm location. Vascular probe used in live manner. Vein identified. 2 attempts. IV secured. Good venous flow. No complication. Assessment: Good patency and blood return. Post-procedure: Patient tolerated the procedure well with no immediate complications. Performed by Ady Hendricks MD 3:33 AM 
 
 
 
 
Diagnosis Clinical Impression: 1. Closed fracture of right hip, initial encounter ([facility] [address] Disposition: admit Follow-up Information None Patient's Medications Start Taking No medications on file Continue Taking CLOBETASOL (TEMOVATE) 0.05 % TOPICAL CREAM    Apply  to affected area two (2) times a day. FUROSEMIDE (LASIX) 20 MG TABLET    TAKE 1 TABLET BY MOUTH ONCE DAILY  
 IRON, FERROUS SULFATE, 325 MG (65 MG IRON) TABLET    TAKE 1 TABLET BY MOUTH ONCE DAILY BEFORE  BREAKFAST  
 LOSARTAN (COZAAR) 50 MG TABLET    TAKE 1 TABLET BY MOUTH TWICE DAILY METOPROLOL TARTRATE (LOPRESSOR) 50 MG TABLET    TAKE ONE TABLET BY MOUTH ONCE DAILY MUPIROCIN (BACTROBAN) 2 % OINTMENT    Apply  to affected area daily. POLYETHYLENE GLYCOL (MIRALAX) 17 GRAM/DOSE POWDER    Take 17 g by mouth daily. SENNA-DOCUSATE (PERICOLACE) 8.6-50 MG PER TABLET    Take 1 tablet by mouth daily. These Medications have changed No medications on file Stop Taking No medications on file  
 
_______________________________ Attestations: 
Scribe Attestation Mendel Oswald MD acting as a scribe for and in the presence of Gardenia Hilario MD     
February 14, 2020 at 3:33 AM 
    
Provider Attestation:     
I personally performed the services described in the documentation, reviewed the documentation, as recorded by the scribe in my presence, and it accurately and completely records my words and actions. February 14, 2020 at 3:33 AM - Gardenia Hilario MD   
_______________________________

## 2020-02-14 NOTE — ED NOTES
Received report from Tae Jewell, assumed care of pt at this time. Per report pt has a right hip fracture after a fall at home. Pt has dementia. Richardson in place and LR at 75ml/hr. Plan: admit 
 
 
0850:  Pt opens eyes to vocal stimuli. Pt oriented to self only. Patient educated on the medication(s) he/she is going to receive, allergies reviewed/verified and patient medicated as ordered. Side rails up and call light within reach. Will continue to monitor. 3136:  Pt noted to be moaning but is not able to state if she is in pain. Patient educated on the medication(s) he/she is going to receive, allergies reviewed/verified and patient medicated as ordered. Side rails up and call light within reach. Will continue to monitor. 9993; This writer spoke to pts Daughter Luly Lamar via telephone [phone] and obtained telephone authorization to give platelets. Dr. Tracey Trinh at [facility] station and was updated on the telephone authorization 1930:  Pt to receive one pack of platelets with a second one pending from the [employer]. 1104:  1st unit of platelets completed  No signs or symptoms of sob or rash noted. 1108: Pt with second platelet up at this time 1155:  Second platelet unit completed and OR is sending for this pt 
 
1159:  Pt to OR holding area at this time.

## 2020-02-14 NOTE — H&P
Zuleima Geronimo is a 80 y.o.  female who is being seen for right hip fracture. Onset of symptoms was abrupt with unchanged course since that time. The pain is located right hip. Patient is unable to communicate her pain or cause of her fall. .  Previous studies include Right Hip Xray. 
  
    
Past Medical History:  
Diagnosis Date  Anemia 1/11/2010  
  thrombocytopenia  Anxiety 1/11/2010  Atrial fibrillation ([address] 1/11/2010  Atrial flutter (HCC)    
  recurrent  CAD (coronary artery disease) 8/3/2010  Cataract 1/11/2010  Coronary atherosclerosis of native coronary artery    
 CVA (cerebral infarction)    
 Dementia ([address] 2/14/2020  Depression 3/18/2014  Diverticulitis 11/19/2014  DVT (deep venous thrombosis) ([address] 1/1/2007  Dysarthria    
 Essential hypertension, benign    
 GERD (gastroesophageal reflux disease) 1/11/2010  
 History of blood transfusion 2/16/2016  History of cardiac pacemaker 2/14/2020  HLD (hyperlipidemia) 1/11/2010  
 HTN (hypertension) 1/11/2010  Intermediate coronary syndrome (HCC)    
 Nonspecific abnormal electrocardiogram (ECG) (EKG)    
 Osteoporosis 1/4/2016  Pre-operative cardiovascular examination    
 PSVT (paroxysmal supraventricular tachycardia) ([address] 1/11/2010  Pulmonary embolism ([address] 1/1/2007  Right knee pain 1/11/2010  Stroke [facility])    
 Syncope 5/13/2009  Thrombocytosis (HCC)    
 Unspecified combined systolic and diastolic heart failure    
  
     
Past Surgical History:  
Procedure Laterality Date  CARDIAC SURG PROCEDURE UNLIST      
  ablation  HX CATARACT REMOVAL      
  bilateral  
 HX CHOLECYSTECTOMY      
 HX OTHER SURGICAL      
  IVC filter  HX PACEMAKER      
 IR IVC FILTER PLACEMENT PERCUTANEOUS   2007  
  
     
Family History Problem Relation Age of Onset Fredrick.Bran Arthritis-osteo Mother    
 Hypertension Mother    
 Arthritis-osteo Father    
  Coronary Artery Disease Neg Hx    
  
Social History  
  
     
Tobacco Use  Smoking status: Never Smoker  Smokeless tobacco: Never Used Substance Use Topics  Alcohol use: No  
    Alcohol/week: 0.0 standard drinks Current Facility-Administered Medications Medication Dose Route Frequency Provider Last Rate Last Dose  lactated [place] infusion  75 mL/hr IntraVENous CONTINUOUS [place] A, DO 75 mL/hr at 02/14/20 0700 75 mL/hr at 02/14/20 0700  acetaminophen (TYLENOL) suppository 650 mg  650 mg Rectal Q4H PRN [facility], DO      
 diphenhydrAMINE (BENADRYL) injection 25 mg  25 mg IntraVENous Q4H PRN [facility]      
 ondansetron [facility]) injection 4 mg  4 mg IntraVENous Q4H PRN [facility], DO      
 bisacodyL (DULCOLAX) suppository 10 mg  10 mg Rectal DAILY PRN [facility], DO      
 [START ON 2/15/2020] losartan (COZAAR) tablet 50 mg  50 mg Oral DAILY [place] A, DO      
 metoprolol tartrate (LOPRESSOR) tablet 50 mg  50 mg Oral DAILY [place] A, DO   50 mg at 02/14/20 0841  
 [START ON 2/15/2020] polyethylene glycol (MIRALAX) packet 17 g  17 g Oral DAILY [facility], DO      
 morphine injection 2 mg  2 mg IntraVENous Q4H PRN [facility], DO   2 mg at 02/14/20 4382  
 naloxone (NARCAN) injection 0.4 mg  0.4 mg IntraVENous EVERY 2 MINUTES AS NEEDED [facility], DO      
 0.9% sodium chloride infusion 250 mL  250 mL IntraVENous PRN Arabella Ramirez MD      
 sodium chloride (NS) flush 5-40 mL  5-40 mL IntraVENous Q8H Dominic Koo DO      
 sodium chloride (NS) flush 5-40 mL  5-40 mL IntraVENous PRN Dominic Koo DO      
 clindamycin phosphate (CLEOCIN) 900 mg in 0.9% sodium chloride 100 mL IVPB  900 mg IntraVENous ONCE Dominic Koo DO      
  
      
Current Outpatient Medications Medication Sig Dispense Refill  metoprolol tartrate (LOPRESSOR) 50 mg tablet TAKE ONE TABLET BY MOUTH ONCE DAILY 90 Tab 1  
 furosemide (LASIX) 20 mg tablet TAKE 1 TABLET BY MOUTH ONCE DAILY 90 Tab 1  
 losartan (COZAAR) 50 mg tablet TAKE 1 TABLET BY MOUTH TWICE DAILY 180 Tab 1  clobetasol (TEMOVATE) 0.05 % topical cream Apply  to affected area two (2) times a day. 15 g 0  
 mupirocin (BACTROBAN) 2 % ointment Apply  to affected area daily. 22 g 0  
 IRON, FERROUS SULFATE, 325 mg (65 mg iron) tablet TAKE 1 TABLET BY MOUTH ONCE DAILY BEFORE  BREAKFAST 180 Tab 0  
 polyethylene glycol (MIRALAX) 17 gram/dose powder Take 17 g by mouth daily. 527 g 1  
 senna-docusate (PERICOLACE) 8.6-50 mg per tablet Take 1 tablet by mouth daily. 30 tablet 0  
  
  
    
Allergies Allergen Reactions  Pcn [Penicillins] Hives  
  
  
Review of Systems: A comprehensive review of systems was negative except for that written in the History of Present Illness.  
  
Objective:  
  
Intake and Output:   
02/14 0701 - 02/14 1900 In: 1330 Out: - No intake/output data recorded. 
  
Physical Exam:  
Visit Vitals /68 (BP 1 Location: Left arm, BP Patient Position: At rest) Pulse 70 Temp 97.6 °F (36.4 °C) Resp 18 Ht 5' 1\" (1.549 m) Wt 120 lb (54.4 kg) SpO2 98% BMI 22.67 kg/m²  
  
General:  Alert, cooperative, no distress, appears stated age. Head:  Normocephalic, without obvious abnormality, atraumatic. Eyes:  Conjunctivae/corneas clear. PERRL, EOMs intact. Fundi benign. Ears:  Normal TMs and external ear canals both ears. Nose: Nares normal. Septum midline. Mucosa normal. No drainage or sinus tenderness. Throat: Lips, mucosa, and tongue normal. Teeth and gums normal.  
Neck: Supple, symmetrical, trachea midline, no adenopathy, thyroid: no enlargement/tenderness/nodules, no carotid bruit and no JVD. Back:   Symmetric, no curvature. ROM normal. No CVA tenderness. Lungs:   Clear to auscultation bilaterally. Chest wall:  No tenderness or deformity. Heart:  Regular rate and rhythm, S1, S2 normal, no murmur, click, rub or gallop. Breast Exam:  No tenderness, masses, or nipple abnormality. Abdomen:   Soft, non-tender. Bowel sounds normal. No masses,  No organomegaly. Genitalia:  Normal female without lesion, discharge or tenderness. Rectal:  Normal tone,  no masses or tenderness Guaiac negative stool. Extremities: RLE shortened and externally rotated. Cap refill brisk Pulses: 2+ and symmetric all extremities. Skin: Skin color, texture, turgor normal. No rashes or lesions. Lymph nodes: Cervical, supraclavicular, and axillary nodes normal.  
Neurologic: CNII-XII intact. Normal strength, sensation and reflexes throughout.  
  
Plain films of right hip and CT scan shows a nondisplaced femoral neck fracture that is valgus impacted 
  
Data Review:  
Recent Results Recent Results (from the past 24 hour(s)) EKG, 12 LEAD, INITIAL  
  Collection Time: 02/14/20  1:49 AM  
Result Value Ref Range  
  Ventricular Rate 70 BPM  
  Atrial Rate 267 BPM  
  QRS Duration 152 ms  
  Q-T Interval 500 ms  
  QTC Calculation (Bezet) 540 ms  
  Calculated R Axis -59 degrees  
  Calculated T Axis 128 degrees  
  Diagnosis      
    Ventricular-paced rhythm Abnormal ECG When compared with ECG of 20-SEP-2019 11:21, No significant change was found Confirmed by Andre Samuels ([id number]) on 2/14/2020 7:49:29 AM 
   
CBC WITH AUTOMATED DIFF  
  Collection Time: 02/14/20  2:25 AM  
Result Value Ref Range  
  WBC 7.2 4.6 - 13.2 K/uL  
  RBC 4.30 4.20 - 5.30 M/uL  
  HGB 12.8 12.0 - 16.0 g/dL  
  HCT 39.0 35.0 - 45.0 %  
  MCV 90.7 74.0 - 97.0 FL  
  MCH 29.8 24.0 - 34.0 PG  
  MCHC 32.8 31.0 - 37.0 g/dL  
  RDW 15.9 (H) 11.6 - 14.5 %  
  PLATELET 35 (L) 717 - 420 K/uL  
  NEUTROPHILS 87 (H) 40 - 73 %  
  LYMPHOCYTES 9 (L) 21 - 52 %  
  MONOCYTES 3 3 - 10 %  
  EOSINOPHILS 1 0 - 5 %  
  BASOPHILS 0 0 - 2 %   ABS. NEUTROPHILS 6.3 1.8 - 8.0 K/UL  
  ABS. LYMPHOCYTES 0.6 (L) 0.9 - 3.6 K/UL  
  ABS. MONOCYTES 0.2 0.05 - 1.2 K/UL  
  ABS. EOSINOPHILS 0.1 0.0 - 0.4 K/UL  
  ABS. BASOPHILS 0.0 0.0 - 0.1 K/UL  
  DF AUTOMATED    
  PLATELET COMMENTS DECREASED PLATELETS    
  RBC COMMENTS NORMOCYTIC, NORMOCHROMIC METABOLIC PANEL, COMPREHENSIVE  
  Collection Time: 02/14/20  2:25 AM  
Result Value Ref Range  
  Sodium 140 136 - 145 mmol/L  
  Potassium 4.2 3.5 - 5.5 mmol/L  
  Chloride 106 100 - 111 mmol/L  
  CO2 26 21 - 32 mmol/L  
  Anion gap 8 3.0 - 18 mmol/L  
  Glucose 116 (H) 74 - 99 mg/dL  
  BUN 11 7.0 - 18 MG/DL  
  Creatinine 0.94 0.6 - 1.3 MG/DL  
  BUN/Creatinine ratio 12 12 - 20    
  GFR est AA >60 >60 ml/min/1.73m2  
  GFR est non-AA 56 (L) >60 ml/min/1.73m2  
  Calcium 8.9 8.5 - 10.1 MG/DL  
  Bilirubin, total 1.1 (H) 0.2 - 1.0 MG/DL  
  ALT (SGPT) 30 13 - 56 U/L  
  AST (SGOT) 37 10 - 38 U/L  
  Alk. phosphatase 52 45 - 117 U/L  
  Protein, total 8.5 (H) 6.4 - 8.2 g/dL  
  Albumin 3.5 3.4 - 5.0 g/dL  
  Globulin 5.0 (H) 2.0 - 4.0 g/dL  
  A-G Ratio 0.7 (L) 0.8 - 1.7 TYPE & SCREEN  
  Collection Time: 02/14/20  2:25 AM  
Result Value Ref Range  
  Crossmatch Expiration 02/17/2020    
  ABO/Rh(D) AB POSITIVE    
  Antibody screen NEG    
URINALYSIS W/ RFLX MICROSCOPIC  
  Collection Time: 02/14/20  5:48 AM  
Result Value Ref Range  
  Color YELLOW    
  Appearance CLEAR    
  Specific gravity 1.020 1.005 - 1.030    
  pH (UA) 6.0 5.0 - 8.0    
  Protein 100 (A) NEG mg/dL  
  Glucose NEGATIVE  NEG mg/dL  
  Ketone 15 (A) NEG mg/dL  
  Bilirubin NEGATIVE  NEG    
  Blood TRACE (A) NEG    
  Urobilinogen 1.0 0.2 - 1.0 EU/dL  
  Nitrites NEGATIVE  NEG    
  Leukocyte Esterase NEGATIVE  NEG    
METABOLIC PANEL, COMPREHENSIVE  
  Collection Time: 02/14/20  5:48 AM  
Result Value Ref Range  
  Sodium 139 136 - 145 mmol/L  
  Potassium 4.2 3.5 - 5.5 mmol/L  
  Chloride 105 100 - 111 mmol/L  
   CO2 27 21 - 32 mmol/L  
  Anion gap 7 3.0 - 18 mmol/L  
  Glucose 106 (H) 74 - 99 mg/dL  
  BUN 12 7.0 - 18 MG/DL  
  Creatinine 0.94 0.6 - 1.3 MG/DL  
  BUN/Creatinine ratio 13 12 - 20    
  GFR est AA >60 >60 ml/min/1.73m2  
  GFR est non-AA 56 (L) >60 ml/min/1.73m2  
  Calcium 8.6 8.5 - 10.1 MG/DL  
  Bilirubin, total 1.0 0.2 - 1.0 MG/DL  
  ALT (SGPT) 24 13 - 56 U/L  
  AST (SGOT) 30 10 - 38 U/L  
  Alk. phosphatase 46 45 - 117 U/L  
  Protein, total 7.6 6.4 - 8.2 g/dL  
  Albumin 3.1 (L) 3.4 - 5.0 g/dL  
  Globulin 4.5 (H) 2.0 - 4.0 g/dL  
  A-G Ratio 0.7 (L) 0.8 - 1.7 MAGNESIUM  
  Collection Time: 02/14/20  5:48 AM  
Result Value Ref Range  
  Magnesium 2.0 1.6 - 2.6 mg/dL PHOSPHORUS  
  Collection Time: 02/14/20  5:48 AM  
Result Value Ref Range  
  Phosphorus 4.4 2.5 - 4.9 MG/DL  
CBC WITH AUTOMATED DIFF  
  Collection Time: 02/14/20  5:48 AM  
Result Value Ref Range  
  WBC 6.5 4.6 - 13.2 K/uL  
  RBC 3.95 (L) 4.20 - 5.30 M/uL  
  HGB 11.8 (L) 12.0 - 16.0 g/dL  
  HCT 35.7 35.0 - 45.0 %  
  MCV 90.4 74.0 - 97.0 FL  
  MCH 29.9 24.0 - 34.0 PG  
  MCHC 33.1 31.0 - 37.0 g/dL  
  RDW 15.8 (H) 11.6 - 14.5 %  
  PLATELET 37 (L) 424 - 420 K/uL  
  NEUTROPHILS 84 (H) 40 - 73 %  
  LYMPHOCYTES 10 (L) 21 - 52 %  
  MONOCYTES 5 3 - 10 %  
  EOSINOPHILS 1 0 - 5 %  
  BASOPHILS 0 0 - 2 %  
  ABS. NEUTROPHILS 5.4 1.8 - 8.0 K/UL  
  ABS. LYMPHOCYTES 0.7 (L) 0.9 - 3.6 K/UL  
  ABS. MONOCYTES 0.3 0.05 - 1.2 K/UL  
  ABS. EOSINOPHILS 0.1 0.0 - 0.4 K/UL  
  ABS. BASOPHILS 0.0 0.0 - 0.1 K/UL  
  DF AUTOMATED    
PTT  
  Collection Time: 02/14/20  5:48 AM  
Result Value Ref Range  
  aPTT 32.0 23.0 - 36.4 SEC PROTHROMBIN TIME + INR  
  Collection Time: 02/14/20  5:48 AM  
Result Value Ref Range  
  Prothrombin time 16.0 (H) 11.5 - 15.2 sec  
  INR 1.3 (H) 0.8 - 1.2 URINE MICROSCOPIC ONLY  
  Collection Time: 02/14/20  5:48 AM  
Result Value Ref Range  
  WBC 1 to 3 0 - 4 /hpf  
  RBC 1 to 2 0 - 5 /hpf  
   Epithelial cells FEW 0 - 5 /lpf  
  Bacteria FEW (A) NEG /hpf PLATELETS, ALLOCATE  
  Collection Time: 02/14/20  8:45 AM  
Result Value Ref Range  
  CALLED TO:  LAITH Akhtar, [id number] AT 0842 BY JNP.    
  Unit number [id number]    
  Blood component type PLPH LR,PAS2    
  Unit division 00    
  Status of unit ISSUED    
  Unit number [id number]    
  Blood component type PLTPH LR,2    
  Unit division 00    
  Status of unit ISSUED    
  
  
  
  
Assessment:  
  
Principal Problem: 
  Closed [facility] (2/14/2020) 
  Active Problems: 
  HTN (hypertension) (1/11/2010) 
  
  GERD (gastroesophageal reflux disease) (1/11/2010) Overview: Dr. Cantu 
  Thrombocytopenia ([facility] (4/18/2015) 
  
  Osteoporosis (1/4/2016) 
  Monoclonal gammopathy of undetermined significance (6/5/2018) 
  History of cardiac pacemaker (2/14/2020) 
  Dementia ([facility] (2/14/2020)   
  
  
Plan:  
  
To OR for Percutaneous Cannulated screw fixation of right femoral neck DVT Px per medicine 
  
ICE 
  
PT/OT - WBAT 
  
DC Planning

## 2020-02-14 NOTE — PROGRESS NOTES
PROGRESS NOTE Patient: Lois Rinne  MRN: [id number]  SSN: [id number] YOB: 1927  Age: 80 y.o. Sex: female Admit Date: 2/13/2020 LOS:  LOS: 1 day POD # 1 S/P Procedure(s): RIGHT HIP SCREW FIXATION/ CLOSED REDUCTION Case discussed with:  []Patient  []Family  []Nursing  []Case Management ASSESSMENT Lois Rinne IS A 80 y.o. old who is resting at this time. She is comfortable PLAN  
  
1) Orthopaedics Dx:  Right femoral neck fracture · Pain Management:   Per IM/ medical team  
· Antibiotics: Antibiotics: Clindamycin for 23 hours perioperative dosing prophylaxis for infection. DVT Prophylaxis: SCD//     
       Chemo Prophylaxis:  Per Medical Team DVT Prophylaxis: · Weight Bear status:  See Dr Cassidy Ribera PT order · Consults: PT/OT/ Intervention/ Consults: 
  PT/OT : [x]PT / [] OT ordered //    
  DC disposition: TBD,   
  []  []  [] [facility] ·  Consultations//Orthopaedic Services following Lois Rinne include Consultants following patients:   Lois Rinne is on medicine service with Ortho consultation · Indwelling Catheters: IV : present, Drains: not present, Richardson:present Medical Diagnosis :  
  ICD-10-CM ICD-9-CM 1. Closed fracture of right hip, initial encounter [facility] S72.001A 820.8 Patient Active Problem List  
Diagnosis Code  
 HTN (hypertension) I10  
 GERD (gastroesophageal reflux disease) K21.9  Anxiety F41.9  DVT (deep venous thrombosis) ([facility]) I82.409  Anemia D64.9  
 HLD (hyperlipidemia) E78.5  Atrial fibrillation ([facility]) I48.91  
 PSVT (paroxysmal supraventricular tachycardia) ([facility]) I47.1  Syncope R55  Cataract H26.9  Right knee pain M25.561  
 CAD (coronary artery disease) I25.10  Hypertrophic cardiomyopathy ([facility]) I42.2  Cardiomegaly I51.7  Palpitations R00.2  Atrial flutter (HCC) I48.92  
 Coronary atherosclerosis of native coronary artery I25.10  Essential hypertension, benign I10  
 Unspecified combined systolic and diastolic heart failure H47.61  Nonspecific abnormal electrocardiogram (ECG) (EKG) R94.31  
 Intermediate coronary syndrome (HCC) I20.0  Depression F32.9  GI bleed K92.2  Diverticulitis K57.92  
 S/P ablation operation for arrhythmia Z98.890, Z86.79  
 CVA (cerebral infarction) I63.9  Acute right-sided weakness R53.1  Thrombocytopenia ([facility] D69.6  Osteoporosis M81.0  Acute GI bleeding K92.2  Acute blood loss anemia D62  Diverticulosis K57.90  
 History of blood transfusion Z92.89  Lower GI bleed K92.2  Advance directive discussed with patient Z70.80  
 Monoclonal gammopathy of undetermined significance D47.2  Closed right hip fracture ([facility] S72.001A  History of cardiac pacemaker Z95.0  Dementia ([facility] F03.90 · Code Status: DNR 
 
    
SUBJECTIVE Patient seen and evaluated. She has dementia. OBJECTIVE EXAMINATION Visit Vitals /80 Pulse 71 Temp 97.1 °F (36.2 °C) Resp 20 Ht 5' 1\" (1.549 m) Wt 120 lb (54.4 kg) SpO2 97% BMI 22.67 kg/m² Intake/Output Summary (Last 24 hours) at 2/15/2020 8908 Last data filed at 2/15/2020 6670 Gross per 24 hour Intake 2030 ml Output 720 ml Net 1310 ml Appearance: alert, no distress Dexter Glow yes dementia Psychiatric: Affect and mood are appropriate. Respiratory: Breathing is unlabored without accessory chest muscle use EXTREMITY :    RIGHT  HIP 
 
DRESSINGS: Clean,dry , intact INCISIONS:  dressings covering the incision  not assessed) Wound: no wound present Extremities:        No embolic phenomena to the toes Pulses in tact to the right foot Lower extremities are warm and appear well perfused DVT: No evidence of DVT seen on examination at this time LABS AND MEDICATION REVIEW  
 
  
 CMP:  
Lab Results Component Value Date/Time  02/15/2020 05:54 AM  
 K 3.9 02/15/2020 05:54 AM  
  02/15/2020 05:54 AM  
 CO2 29 02/15/2020 05:54 AM  
 AGAP 6 02/15/2020 05:54 AM  
 GLU 97 02/15/2020 05:54 AM  
 BUN 17 02/15/2020 05:54 AM  
 CREA 0.96 02/15/2020 05:54 AM  
 GFRAA >60 02/15/2020 05:54 AM  
 GFRNA 54 (L) 02/15/2020 05:54 AM  
 CA 7.9 (L) 02/15/2020 05:54 AM  
 
CBC:  
Lab Results Component Value Date/Time WBC 5.7 02/15/2020 05:54 AM  
 HGB 10.0 (L) 02/15/2020 05:54 AM  
 HCT 30.6 (L) 02/15/2020 05:54 AM  
 PLT 64 (L) 02/15/2020 05:54 AM  
  
 
Current Facility-Administered Medications Medication Dose Route Frequency  acetaminophen (TYLENOL) suppository 650 mg  650 mg Rectal Q4H PRN  
 diphenhydrAMINE (BENADRYL) injection 25 mg  25 mg IntraVENous Q4H PRN  
 ondansetron (ZOFRAN) injection 4 mg  4 mg IntraVENous Q4H PRN  
 bisacodyL (DULCOLAX) suppository 10 mg  10 mg Rectal DAILY PRN  
 losartan (COZAAR) tablet 50 mg  50 mg Oral DAILY  metoprolol tartrate (LOPRESSOR) tablet 50 mg  50 mg Oral DAILY  polyethylene glycol (MIRALAX) packet 17 g  17 g Oral DAILY  morphine injection 2 mg  2 mg IntraVENous Q4H PRN  
 naloxone (NARCAN) injection 0.4 mg  0.4 mg IntraVENous EVERY 2 MINUTES AS NEEDED  
 0.9% sodium chloride infusion 250 mL  250 mL IntraVENous PRN  
 clindamycin phosphate (CLEOCIN) 600 mg in 0.9% sodium chloride 100 mL IVPB  600 mg IntraVENous Q8H REVIEW OF SYSTEMS : 2/15/2020  ALL BELOW ARE Negative except : SEE HPI Past Medical History:  
Diagnosis Date  Anemia 1/11/2010  
 thrombocytopenia  Anxiety 1/11/2010  Atrial fibrillation ([facility] 1/11/2010  Atrial flutter ([facility] recurrent  CAD (coronary artery disease) 8/3/2010  Cataract 1/11/2010  Coronary atherosclerosis of native coronary artery  CVA (cerebral infarction)  D[facility] ([facility] 2/14/2020  Depression 3/18/2014  Diverticulitis 11/19/2014  DVT (deep venous thrombosis) ([facility] 1/1/2007  Dysarthria  Essential hypertension, benign  GERD (gastroesophageal reflux disease) 1/11/2010  
 History of blood transfusion 2/16/2016  History of cardiac pacemaker 2/14/2020  HLD (hyperlipidemia) 1/11/2010  
 HTN (hypertension) 1/11/2010  Intermediate coronary syndrome ([facility]  Nonspecific abnormal electrocardiogram (ECG) (EKG)  Osteoporosis 1/4/2016  Pre-operative cardiovascular examination  PSVT (paroxysmal supraventricular tachycardia) ([facility] 1/11/2010  Pulmonary embolism ([facility] 1/1/2007  Right knee pain 1/11/2010  Stroke ([facility]  Syncope 5/13/2009  Thrombocytosis ([facility]  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  
Procedure Laterality Date  CARDIAC SURG PROCEDURE UNLIST    
 ablation  HX CATARACT REMOVAL    
 bilateral  
 HX CHOLECYSTECTOMY  HX OTHER SURGICAL    
 IVC filter  HX PACEMAKER    
 IR IVC FILTER PLACEMENT PERCUTANEOUS  2007 Social History Socioeconomic History  Marital status:  Spouse name: Not on file  Number of children: Not on file  Years of education: Not on file  Highest education level: Not on file Occupational History  Not on file Social Needs  Financial resource strain: Not on file  Food insecurity:  
  Worry: Not on file Inability: Not on file  Transportation needs:  
  Medical: Not on file Non-medical: Not on file Tobacco Use  Smoking status: Never Smoker  Smokeless tobacco: Never Used Substance and Sexual Activity  Alcohol use: No  
  Alcohol/week: 0.0 standard drinks  Drug use: No  
 Sexual activity: Never Lifestyle  Physical activity:  
  Days per week: Not on file Minutes per session: Not on file  Stress: Not on file Relationships  Social connections:  
  Talks on phone: Not on file Gets together: Not on file Attends Taoist service: Not on file Active member of club or organization: Not on file Attends meetings of clubs or organizations: Not on file Relationship status: Not on file  Intimate partner violence:  
  Fear of current or ex partner: Not on file Emotionally abused: Not on file Physically abused: Not on file Forced sexual activity: Not on file Other Topics Concern  Not on file Social History Narrative  Not on file Family History Problem Relation Age of Onset [facility] Arthritis-osteo Mother  Hypertension Mother  Arthritis-osteo Father  Coronary Artery Disease Neg Hx Prior to Admission medications Medication Sig Start Date End Date Taking? Authorizing Provider  
metoprolol tartrate (LOPRESSOR) 50 mg tablet TAKE ONE TABLET BY MOUTH ONCE DAILY 2/12/20   Pratibha Jon NP  
furosemide (LASIX) 20 mg tablet TAKE 1 TABLET BY MOUTH ONCE DAILY 9/10/19   Pratibha Jon NP  
losartan (COZAAR) 50 mg tablet TAKE 1 TABLET BY MOUTH TWICE DAILY 9/10/19   Pratibha Jon NP  
clobetasol (TEMOVATE) 0.05 % topical cream Apply  to affected area two (2) times a day. 2/21/19   Pratibha Jon NP  
mupirocin (BACTROBAN) 2 % ointment Apply  to affected area daily. 2/21/19   Pratibha Jon NP  
IRON, FERROUS SULFATE, 325 mg (65 mg iron) tablet TAKE 1 TABLET BY MOUTH ONCE DAILY BEFORE  BREAKFAST 2/9/19   Pratibha Jon NP  
polyethylene glycol (MIRALAX) 17 gram/dose powder Take 17 g by mouth daily. 11/19/14   Ady Forbes DO  
senna-docusate (PERICOLACE) 8.6-50 mg per tablet Take 1 tablet by mouth daily. 11/6/14   Sylvie Garcia MD  
 
Current Facility-Administered Medications Medication Dose Route Frequency  acetaminophen (TYLENOL) suppository 650 mg  650 mg Rectal Q4H PRN  
 diphenhydrAMINE (BENADRYL) injection 25 mg  25 mg IntraVENous Q4H PRN  
 ondansetron (ZOFRAN) injection 4 mg  4 mg IntraVENous Q4H PRN  
  bisacodyL (DULCOLAX) suppository 10 mg  10 mg Rectal DAILY PRN  
 losartan (COZAAR) tablet 50 mg  50 mg Oral DAILY  metoprolol tartrate (LOPRESSOR) tablet 50 mg  50 mg Oral DAILY  polyethylene glycol (MIRALAX) packet 17 g  17 g Oral DAILY  morphine injection 2 mg  2 mg IntraVENous Q4H PRN  
 naloxone (NARCAN) injection 0.4 mg  0.4 mg IntraVENous EVERY 2 MINUTES AS NEEDED  
 0.9% sodium chloride infusion 250 mL  250 mL IntraVENous PRN  
 clindamycin phosphate (CLEOCIN) 600 mg in 0.9% sodium chloride 100 mL IVPB  600 mg IntraVENous Q8H Allergies Allergen Reactions  Pcn [Penicillins] Hives DIAGNOSTIC IMAGING Jesscia Thomas MD 
2/15/2020 
8:05 AM

## 2020-02-14 NOTE — CONSULTS
Orthopedic Surgery Consult Subjective:  
 
Zuleima Geronimo is a 80 y.o.  female who is being seen for right hip fracture. Onset of symptoms was abrupt with unchanged course since that time. The pain is located right hip. Patient is unable to communicate her pain or cause of her fall. .  Previous studies include Right Hip Xray. Past Medical History:  
Diagnosis Date  Anemia 1/11/2010  
 thrombocytopenia  Anxiety 1/11/2010  Atrial fibrillation ([address] 1/11/2010  Atrial flutter ([address] recurrent  CAD (coronary artery disease) 8/3/2010  Cataract 1/11/2010  Coronary atherosclerosis of native coronary artery  CVA (cerebral infarction)  Dementia ([address] 2/14/2020  Depression 3/18/2014  Diverticulitis 11/19/2014  DVT (deep venous thrombosis) ([address] 1/1/2007  Dysarthria  Essential hypertension, benign  GERD (gastroesophageal reflux disease) 1/11/2010  
 History of blood transfusion 2/16/2016  History of cardiac pacemaker 2/14/2020  HLD (hyperlipidemia) 1/11/2010  
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  
Procedure Laterality Date  CARDIAC SURG PROCEDURE UNLIST    
 ablation  HX CATARACT REMOVAL    
 bilateral  
 HX CHOLECYSTECTOMY  HX OTHER SURGICAL    
 IVC filter  HX PACEMAKER    
 IR IVC FILTER PLACEMENT PERCUTANEOUS  2007 Family History Problem Relation Age of Onset Blase Liming Arthritis-osteo Mother  Hypertension Mother  Arthritis-osteo Father  Coronary Artery Disease Neg Hx Social History Tobacco Use  Smoking status: Never Smoker  Smokeless tobacco: Never Used Substance Use Topics  Alcohol use: No  
  Alcohol/week: 0.0 standard drinks Current Facility-Administered Medications Medication Dose Route Frequency Provider Last Rate Last Dose  lactated Ringers infusion  75 mL/hr IntraVENous CONTINUOUS Tracee Dacosta A, DO 75 mL/hr at 02/14/20 0700 75 mL/hr at 02/14/20 0700  acetaminophen (TYLENOL) suppository 650 mg  650 mg Rectal Q4H PRN Kody Habaljeetm, DO      
 diphenhydrAMINE (BENADRYL) injection 25 mg  25 mg IntraVENous Q4H PRN Tracee Dacosta A, DO      
 ondansetron [facility]) injection 4 mg  4 mg IntraVENous Q4H PRN Kody Hakim, DO      
 bisacodyL (DULCOLAX) suppository 10 mg  10 mg Rectal DAILY PRN Kody Hakim, DO      
 [START ON 2/15/2020] losartan (COZAAR) tablet 50 mg  50 mg Oral DAILY Tracee Dacosta A, DO      
 metoprolol tartrate (LOPRESSOR) tablet 50 mg  50 mg Oral DAILY Tracee Dacosta A, DO   50 mg at 02/14/20 0841  
 [START ON 2/15/2020] polyethylene glycol (MIRALAX) packet 17 g  17 g Oral DAILY Kody Broderick, DO      
 morphine injection 2 mg  2 mg IntraVENous Q4H PRN Tracee Dacosta A, DO   2 mg at 02/14/20 9047  
 naloxone (NARCAN) injection 0.4 mg  0.4 mg IntraVENous EVERY 2 MINUTES AS NEEDED Tracee TOMAS,       
 0.9% sodium chloride infusion 250 mL  250 mL IntraVENous PRN Yesica Ramirez MD      
 sodium chloride (NS) flush 5-40 mL  5-40 mL IntraVENous Q8H Dominic Koo DO      
 sodium chloride (NS) flush 5-40 mL  5-40 mL IntraVENous PRN Dominic Koo DO      
 clindamycin phosphate (CLEOCIN) 900 mg in 0.9% sodium chloride 100 mL IVPB  900 mg IntraVENous ONCE Dominic Koo DO      
 
Current Outpatient Medications Medication Sig Dispense Refill  metoprolol tartrate (LOPRESSOR) 50 mg tablet TAKE ONE TABLET BY MOUTH ONCE DAILY 90 Tab 1  
 furosemide (LASIX) 20 mg tablet TAKE 1 TABLET BY MOUTH ONCE DAILY 90 Tab 1  
 losartan (COZAAR) 50 mg tablet TAKE 1 TABLET BY MOUTH TWICE DAILY 180 Tab 1  clobetasol (TEMOVATE) 0.05 % topical cream Apply  to affected area two (2) times a day. 15 g 0  
 mupirocin (BACTROBAN) 2 % ointment Apply  to affected area daily. 22 g 0  
 IRON, FERROUS SULFATE, 325 mg (65 mg iron) tablet TAKE 1 TABLET BY MOUTH ONCE DAILY BEFORE  BREAKFAST 180 Tab 0  
 polyethylene glycol (MIRALAX) 17 gram/dose powder Take 17 g by mouth daily. 527 g 1  
 senna-docusate (PERICOLACE) 8.6-50 mg per tablet Take 1 tablet by mouth daily. 30 tablet 0 Allergies Allergen Reactions  Pcn [Penicillins] Hives Review of Systems: A comprehensive review of systems was negative except for that written in the History of Present Illness. Objective: Intake and Output:   
02/14 0701 - 02/14 1900 In: 1330 Out: - No intake/output data recorded. Physical Exam:  
Visit Vitals /68 (BP 1 Location: Left arm, BP Patient Position: At rest) Pulse 70 Temp 97.6 °F (36.4 °C) Resp 18 Ht 5' 1\" (1.549 m) Wt 120 lb (54.4 kg) SpO2 98% BMI 22.67 kg/m² General:  Alert, cooperative, no distress, appears stated age. Head:  Normocephalic, without obvious abnormality, atraumatic. Eyes:  Conjunctivae/corneas clear. PERRL, EOMs intact. Fundi benign. Ears:  Normal TMs and external ear canals both ears. Nose: Nares normal. Septum midline. Mucosa normal. No drainage or sinus tenderness. Throat: Lips, mucosa, and tongue normal. Teeth and gums normal.  
Neck: Supple, symmetrical, trachea midline, no adenopathy, thyroid: no enlargement/tenderness/nodules, no carotid bruit and no JVD. Back:   Symmetric, no curvature. ROM normal. No CVA tenderness. Lungs:   Clear to auscultation bilaterally. Chest wall:  No tenderness or deformity. Heart:  Regular rate and rhythm, S1, S2 normal, no murmur, click, rub or gallop. Breast Exam:  No tenderness, masses, or nipple abnormality. Abdomen:   Soft, non-tender. Bowel sounds normal. No masses,  No organomegaly. Genitalia:  Normal female without lesion, discharge or tenderness. Rectal:  Normal tone,  no masses or tenderness Guaiac negative stool. Extremities: RLE shortened and externally rotated. Cap refill brisk Pulses: 2+ and symmetric all extremities. Skin: Skin color, texture, turgor normal. No rashes or lesions. Lymph nodes: Cervical, supraclavicular, and axillary nodes normal.  
Neurologic: CNII-XII intact. Normal strength, sensation and reflexes throughout. Plain films of right hip and CT scan shows a nondisplaced femoral neck fracture that is valgus impacted Data Review:  
Recent Results (from the past 24 hour(s)) EKG, 12 LEAD, INITIAL Collection Time: 02/14/20  1:49 AM  
Result Value Ref Range Ventricular Rate 70 BPM  
 Atrial Rate 267 BPM  
 QRS Duration 152 ms Q-T Interval 500 ms QTC Calculation (Bezet) 540 ms Calculated R Axis -59 degrees Calculated T Axis 128 degrees Diagnosis Ventricular-paced rhythm Abnormal ECG When compared with ECG of 20-SEP-2019 11:21, No significant change was found Confirmed by Lyndsey May ([id number]) on 2/14/2020 7:49:29 AM 
  
CBC WITH AUTOMATED DIFF Collection Time: 02/14/20  2:25 AM  
Result Value Ref Range WBC 7.2 4.6 - 13.2 K/uL  
 RBC 4.30 4.20 - 5.30 M/uL  
 HGB 12.8 12.0 - 16.0 g/dL HCT 39.0 35.0 - 45.0 % MCV 90.7 74.0 - 97.0 FL  
 MCH 29.8 24.0 - 34.0 PG  
 MCHC 32.8 31.0 - 37.0 g/dL  
 RDW 15.9 (H) 11.6 - 14.5 % PLATELET 35 (L) 118 - 420 K/uL NEUTROPHILS 87 (H) 40 - 73 % LYMPHOCYTES 9 (L) 21 - 52 % MONOCYTES 3 3 - 10 % EOSINOPHILS 1 0 - 5 % BASOPHILS 0 0 - 2 %  
 ABS. NEUTROPHILS 6.3 1.8 - 8.0 K/UL  
 ABS. LYMPHOCYTES 0.6 (L) 0.9 - 3.6 K/UL ABS. MONOCYTES 0.2 0.05 - 1.2 K/UL  
 ABS. EOSINOPHILS 0.1 0.0 - 0.4 K/UL  
 ABS. BASOPHILS 0.0 0.0 - 0.1 K/UL  
 DF AUTOMATED PLATELET COMMENTS DECREASED PLATELETS    
 RBC COMMENTS NORMOCYTIC, NORMOCHROMIC METABOLIC PANEL, COMPREHENSIVE Collection Time: 02/14/20  2:25 AM  
Result Value Ref Range Sodium 140 136 - 145 mmol/L Potassium 4.2 3.5 - 5.5 mmol/L Chloride 106 100 - 111 mmol/L  
 CO2 26 21 - 32 mmol/L Anion gap 8 3.0 - 18 mmol/L Glucose 116 (H) 74 - 99 mg/dL BUN 11 7.0 - 18 MG/DL Creatinine 0.94 0.6 - 1.3 MG/DL  
 BUN/Creatinine ratio 12 12 - 20 GFR est AA >60 >60 ml/min/1.73m2 GFR est non-AA 56 (L) >60 ml/min/1.73m2 Calcium 8.9 8.5 - 10.1 MG/DL Bilirubin, total 1.1 (H) 0.2 - 1.0 MG/DL  
 ALT (SGPT) 30 13 - 56 U/L  
 AST (SGOT) 37 10 - 38 U/L Alk. phosphatase 52 45 - 117 U/L Protein, total 8.5 (H) 6.4 - 8.2 g/dL Albumin 3.5 3.4 - 5.0 g/dL Globulin 5.0 (H) 2.0 - 4.0 g/dL A-G Ratio 0.7 (L) 0.8 - 1.7 TYPE & SCREEN Collection Time: 02/14/20  2:25 AM  
Result Value Ref Range Crossmatch Expiration 02/17/2020 ABO/Rh(D) AB POSITIVE Antibody screen NEG   
URINALYSIS W/ RFLX MICROSCOPIC Collection Time: 02/14/20  5:48 AM  
Result Value Ref Range Color YELLOW Appearance CLEAR Specific gravity 1.020 1.005 - 1.030    
 pH (UA) 6.0 5.0 - 8.0 Protein 100 (A) NEG mg/dL Glucose NEGATIVE  NEG mg/dL Ketone 15 (A) NEG mg/dL Bilirubin NEGATIVE  NEG Blood TRACE (A) NEG Urobilinogen 1.0 0.2 - 1.0 EU/dL Nitrites NEGATIVE  NEG Leukocyte Esterase NEGATIVE  NEG    
METABOLIC PANEL, COMPREHENSIVE Collection Time: 02/14/20  5:48 AM  
Result Value Ref Range Sodium 139 136 - 145 mmol/L Potassium 4.2 3.5 - 5.5 mmol/L Chloride 105 100 - 111 mmol/L  
 CO2 27 21 - 32 mmol/L Anion gap 7 3.0 - 18 mmol/L Glucose 106 (H) 74 - 99 mg/dL BUN 12 7.0 - 18 MG/DL  Creatinine 0.94 0.6 - 1.3 MG/DL  
 BUN/Creatinine ratio 13 12 - 20 GFR est AA >60 >60 ml/min/1.73m2 GFR est non-AA 56 (L) >60 ml/min/1.73m2 Calcium 8.6 8.5 - 10.1 MG/DL Bilirubin, total 1.0 0.2 - 1.0 MG/DL  
 ALT (SGPT) 24 13 - 56 U/L  
 AST (SGOT) 30 10 - 38 U/L Alk. phosphatase 46 45 - 117 U/L Protein, total 7.6 6.4 - 8.2 g/dL Albumin 3.1 (L) 3.4 - 5.0 g/dL Globulin 4.5 (H) 2.0 - 4.0 g/dL A-G Ratio 0.7 (L) 0.8 - 1.7 MAGNESIUM Collection Time: 02/14/20  5:48 AM  
Result Value Ref Range Magnesium 2.0 1.6 - 2.6 mg/dL PHOSPHORUS Collection Time: 02/14/20  5:48 AM  
Result Value Ref Range Phosphorus 4.4 2.5 - 4.9 MG/DL  
CBC WITH AUTOMATED DIFF Collection Time: 02/14/20  5:48 AM  
Result Value Ref Range WBC 6.5 4.6 - 13.2 K/uL  
 RBC 3.95 (L) 4.20 - 5.30 M/uL  
 HGB 11.8 (L) 12.0 - 16.0 g/dL HCT 35.7 35.0 - 45.0 % MCV 90.4 74.0 - 97.0 FL  
 MCH 29.9 24.0 - 34.0 PG  
 MCHC 33.1 31.0 - 37.0 g/dL  
 RDW 15.8 (H) 11.6 - 14.5 % PLATELET 37 (L) 789 - 420 K/uL NEUTROPHILS 84 (H) 40 - 73 % LYMPHOCYTES 10 (L) 21 - 52 % MONOCYTES 5 3 - 10 % EOSINOPHILS 1 0 - 5 % BASOPHILS 0 0 - 2 %  
 ABS. NEUTROPHILS 5.4 1.8 - 8.0 K/UL  
 ABS. LYMPHOCYTES 0.7 (L) 0.9 - 3.6 K/UL  
 ABS. MONOCYTES 0.3 0.05 - 1.2 K/UL  
 ABS. EOSINOPHILS 0.1 0.0 - 0.4 K/UL  
 ABS. BASOPHILS 0.0 0.0 - 0.1 K/UL  
 DF AUTOMATED    
PTT Collection Time: 02/14/20  5:48 AM  
Result Value Ref Range aPTT 32.0 23.0 - 36.4 SEC PROTHROMBIN TIME + INR Collection Time: 02/14/20  5:48 AM  
Result Value Ref Range Prothrombin time 16.0 (H) 11.5 - 15.2 sec INR 1.3 (H) 0.8 - 1.2 URINE MICROSCOPIC ONLY Collection Time: 02/14/20  5:48 AM  
Result Value Ref Range WBC 1 to 3 0 - 4 /hpf  
 RBC 1 to 2 0 - 5 /hpf Epithelial cells FEW 0 - 5 /lpf Bacteria FEW (A) NEG /hpf PLATELETS, ALLOCATE Collection Time: 02/14/20  8:45 AM  
Result Value Ref Range CALLED TO:  [address], [id number] AT 7130 BY ABIGAILP. Unit number [id number] Blood component type PLPH LR,PAS2 Unit division 00 Status of unit ISSUED Unit number [id number] Blood component type PLTPH LR,2 Unit division 00 Status of unit ISSUED Assessment:  
 
Principal Problem: 
  Closed right hip fracture ([facility] (2/14/2020) Active Problems: 
  HTN (hypertension) (1/11/2010) GERD (gastroesophageal reflux disease) (1/11/2010) Overview: Dr. Fei Samuel Thrombocytopenia ([facility] (4/18/2015) Osteoporosis (1/4/2016) Monoclonal gammopathy of undetermined significance (6/5/2018) History of cardiac pacemaker (2/14/2020) Dementia ([facility] (2/14/2020) Plan: To OR for Percutaneous Cannulated screw fixation of right femoral neck DVT Px per medicine ICE 
 
PT/OT - WBAT 
 
DC Planning

## 2020-02-14 NOTE — BRIEF OP NOTE
BRIEF OPERATIVE NOTE Date of Procedure: 2/14/2020 Preoperative Diagnosis: Right Hip Fracture Postoperative Diagnosis: Right Hip Fracture Procedure(s): RIGHT HIP SCREW FIXATION/ CLOSED REDUCTION Surgeon(s) and Role: Dominic Kohli DO - Primary Surgical Assistant: Bay Lewis Surgical Staff: 
Circ-1: Jeremiah Fisher Radiology Technician: Cora Bravo Tech-1: Yohana Conley 
Surg Asst-1: Kristina Crawley Event Time In Time Out Incision Start 02/14/2020 1535 Incision Close 02/14/2020 1552 Anesthesia: General  
Estimated Blood Loss: Minimal 
Specimens: * No specimens in log * Findings: Nondisplaced femoral neck fracture Complications: none Implants:  
Implant Name Type Inv. Item Serial No.  Lot No. LRB No. Used Action SCR [employer] 16 THRD 7.3X80 -- SS -   SCR [employer]BI[employer] 16 THRD 7.3X80 -- SS [id number] SYN[employer] [place]  Right 2 Implanted SCR [employer] 16 THRD 7.3X85 -- SS - [id number]  SCR [employer]BI[employer] 16 THRD 7.3X85 -- [id number] SYNTHES [place]  Right 1 Implanted

## 2020-02-14 NOTE — PERIOP NOTES
TRANSFER - OUT REPORT: 
 
Verbal report given to [facility] RN(name) on [facility]  being transferred to [address] [place] [address](unit) for routine post - op Report consisted of patients Situation, Background, Assessment and  
Recommendations(SBAR). Information from the following report(s) SBAR, Kardex, OR Summary, Procedure Summary, Intake/Output, MAR, Recent Results and Med Rec Status was reviewed with the receiving nurse. Lines:  
Peripheral IV 02/14/20 Right Antecubital (Active) Site Assessment Clean, dry, & intact 2/14/2020  4:07 PM  
Phlebitis Assessment 0 2/14/2020  4:07 PM  
Infiltration Assessment 0 2/14/2020  4:07 PM  
Dressing Status Clean, dry, & intact 2/14/2020  4:07 PM  
Dressing Type Transparent 2/14/2020  4:07 PM  
Hub Color/Line Status Pink;Patent; Flushed 2/14/2020  4:39 AM  
  
 
Opportunity for questions and clarification was provided. Patient transported with: 
 O2 @ 3 liters Registered Nurse Tech

## 2020-02-14 NOTE — ED TRIAGE NOTES
Patient presents to the ED via EMS from home for evaluation after a fall. Per daughter, Amee Barfield fell yesterday. I was sleeping and heard a noise. I found her on the floor. She hasn't seemed herself since so I decided to have her checked out. \"

## 2020-02-14 NOTE — CONSULTS
Hematology / Oncology Consult Note Reason for Consultation: 
Felix Wheeler is a 80 y.o. female who I've been asked to consult for assistance with the patient's thrombocytopenia in the setting of her current right hip fracture. Subjective: HPI: Ms. 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 She was last seen by Dr. 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 The patient was scheduled to begin gammaglobulin therapy as a primary treatment modality while waiting for the results to define will rule out multiple myeloma. In addition, she was scheduled to follow-up with Dr. Amina López in 2 to 3 weeks after the initial consult to discuss potential options of management based on the additional lab data and radiographic data. Unfortunately, I did not see in the EMR with the patient had the bone marrow biopsy done. However the SPEP demonstrated no M-spike, with an increase in gammaglobulin at 2.2 and total globulin at 4.2; free kappa light chains 58.6, free lambda light chains 28.6, kappa/lambda ratio 2.0.  immunofixation shows IgG monoclonal protein with kappa light chain specificity. An apparent polyclonal gammopathy: IgG. Kappa and lambda typing appear increased. Beta-2 microglobulin, serum 2.4 In addition to the aforementioned patient has a past medical history of hypertension, GERD, history of cardiac pacemaker. Patient is not a good historian, no family at bedside. I have obtained history from RN. Apparently patient was found on the floor in feces by her daughter. The patient's dentures were found beneath her imprinted on her back. Patient lives with her daughter.  
 
On admission, WBC 7.2, hemoglobin 12.8, hematocrit 39.0, platelet count 04,442, sodium 140, potassium 4.2, chloride 106, BUN 11, creatinine 0.94, calcium 8.9, ALT 30, AST 37, alk phos 52 Dr. Vernon Goldsmith had spoke with primary attending and recommended 2 units of platelets to be administered approximately an hour prior to surgical intervention. CT head 20 IMPRESSION: 
1. No acute intracranial process identified. 
  
2. Moderate parenchymal volume loss and chronic small vessel ischemic changes. CT lower extremity right 20 FINDINGS:  
There is an impacted femoral neck fracture with mild displacement and no 
significant angulation. The remainder the osseous structures are without 
evidence of acute fracture. Osteopenia is present. Moderate degenerative changes 
noted in the left hip. Mild degenerative change of the pubic symphysis and 
sacroiliac joints. 
  
IMPRESSION IMPRESSION:  
1. Impacted right hip fracture. CT spine cervical 2020 IMPRESSION: 
1. No acute cervical spine fracture 
  
2.  Multilevel degenerative disc disease, facet arthropathy and anterolisthesis 
of C7 on T1. 
  
3. Right-sided pleural effusion. 
  
4. Interstitial thickening of the lung apices. Review of Systems:  
· Unable to obtain due to patient status. Patient moans when asked questions. Physical Assessment:  
 
Visit Vitals /89 Pulse 75 Temp 98.6 °F (37 °C) Resp 15 Ht 5' 1\" (1.549 m) Wt 54.4 kg (120 lb) SpO2 97% BMI 22.67 kg/m² Temp (24hrs), Av.6 °F (37 °C), Min:98.6 °F (37 °C), Max:98.6 °F (37 °C) Physical Exam: 
 
General: Resting in bed,appears uncomfortable, moaning. HEENT:  Anicteric sclerae; pink palpebral conjunctivae; mucosa moist 
Resp:  Symmetrical chest expansion, no accessory muscle use; good airway entry; no rales/ wheezing/ rhonchi noted CV:  S1, S2 present; regular rate and rhythm GI:  Abdomen soft,moans when touched, unable to truly assess for tenderness; (+) active bowel sounds Extremities:  +2 pulses on all extremities; no edema. Skin:  Warm Neurologic:  Moans, does not participate in exam. 
 
 
 
 
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  We have recommended 2 units of platelets to be administered approximately 1 hour prior to surgery. Hold all anticoagulants prior to surgery · Hemoglobin 11.8, hematocrit 35.7, platelet count 22,039 we will continue to monitor. We will offer Retacrit if hemoglobin goes below 10g/dl and hematocrit goes below 30%. · Will discuss with Dr. Frantz Bernard regarding bone marrow biopsy preference during this hospitalization. Past Medical History:  
Diagnosis Date  Anemia 1/11/2010  
 thrombocytopenia  Anxiety 1/11/2010  Atrial fibrillation ([address] 1/11/2010  Atrial flutter ([address] recurrent  CAD (coronary artery disease) 8/3/2010  Cataract 1/11/2010  Coronary atherosclerosis of native coronary artery  CVA (cerebral infarction)  Depression 3/18/2014  Diverticulitis 11/19/2014  DVT (deep venous thrombosis) ([address] 1/1/2007  Dysarthria  Essential hypertension, benign  GERD (gastroesophageal reflux disease) 1/11/2010  
 History of blood transfusion 2/16/2016  History of cardiac pacemaker 2/14/2020  HLD (hyperlipidemia) 1/11/2010  
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  
Procedure Laterality Date  CARDIAC SURG PROCEDURE UNLIST    
 ablation  HX CATARACT REMOVAL    
 bilateral  
 HX CHOLECYSTECTOMY  HX OTHER SURGICAL    
 IVC filter  HX PACEMAKER    
 IR IVC FILTER PLACEMENT PERCUTANEOUS  2007 Family History Problem Relation Age of Onset [facility] Arthritis-osteo Mother  Hypertension Mother  Arthritis-osteo Father  Coronary Artery Disease Neg Hx Allergies Allergen Reactions  Pcn [Penicillins] Hives Home Medications:  
Home Meds reviewed with patient Hospital Medications:  
Current hospital medications reviewed Labs: 
Recent Labs  
  02/14/20 
0548 02/14/20 
0225 02/12/20 
0000 WBC 6.5 7.2 3.9  
RBC 3.95* 4.30 3.75* HCT 35.7 39.0 35.6 MCV 90.4 90.7 95 MCH 29.9 29.8 29.6 MCHC 33.1 32.8 31.2*  
RDW 15.8* 15.9* 14.5 Recent Labs  
  02/14/20 
0548 02/14/20 0225 02/12/20 
0000 CO2 27 26 22 BUN 12 11 9* No results for input(s): ALBUMIN in the last 72 hours. No lab exists for component: [facility], [facility] Thank you for requesting us to consult on your patient. We appreciate the opportunity to participate in your patient's care. Please call if you have questions.  
 
 
Louis Sinclair NP

## 2020-02-14 NOTE — ANESTHESIA POSTPROCEDURE EVALUATION
Procedure(s): RIGHT HIP SCREW FIXATION/ CLOSED REDUCTION. general 
 
Anesthesia Post Evaluation Multimodal analgesia: multimodal analgesia not used between 6 hours prior to anesthesia start to PACU discharge Patient location during evaluation: PACU Patient participation: complete - patient participated Post-procedure mental status: baseline mentation. Pain score: 0 Pain management: adequate Airway patency: patent Anesthetic complications: no 
Cardiovascular status: acceptable Respiratory status: acceptable Post anesthesia nausea and vomiting:  none Vitals Value Taken Time /80 2/14/2020  4:17 PM  
Temp Pulse 70 2/14/2020  4:23 PM  
Resp 17 2/14/2020  4:23 PM  
SpO2 100 % 2/14/2020  4:23 PM  
Vitals shown include unvalidated device data.

## 2020-02-14 NOTE — H&P
History & Physical 
 
Patient: Sedrick Alonso MRN: [id number]  CSN: [id number] YOB: 1927  Age: 80 y.o. Sex: female DOA: 2/13/2020 HPI:  
 
Sedrick Alonso is a 80 y.o. female with a history of hypertension, past CVA, cardiac pacemaker, underlying atrial fibrillation, GERD past GI bleeds with ablation, monoclonal gammopathy with thrombocytopenia, osteoporosis and dementia. Patient's daughter reported that on 2/12/2020 the patient had a fall at home. The daughter stated that while she was in another room she heard a noise and found her mother on the floor. She was assisted back to bed but had not seen the same day late evening on 2/13/2020 she called EMS. Patient presented to [facility] ED In the ER, Ms Lidia Flood was initially hypertensive 192/99 and stabilized to 140s over 80s, heart rate was paced at 70, pulse ox 98 200% on room air. Patient remained afebrile. She had episodes of confusion and agitation with any direct personal care. RN's  present on arrival expressed concern over this patient's condition from home, stating her level of soiling appeared more than 1 days worth. Patient's BMP was unremarkable with GFR greater than 60, protein and albumin within normal limits, UA was positive for 15 ketones specific gravity 1.020, CBC was significant WBCs 3.75 hemoglobin 11.1 hematocrit 35.6, decreased platelets 56 with repeat 35. Patient's affect was stoic and she denied pain at rest, passive range of motion of the right lower extremity elicited signs of discomfort. She was evaluated by CT scan head C-spine and right lower extremity. With findings of an impacted right hip fracture. Orthopedics was consulted from the ED. patient will be admitted to telemetry with anticipation of operative intervention this afternoon. Past Medical History:  
Diagnosis Date  Anemia 1/11/2010  
 thrombocytopenia  Anxiety 1/11/2010  Atrial fibrillation ([facility] 1/11/2010  Atrial flutter ([address] recurrent  CAD (coronary artery disease) 8/3/2010  Cataract 1/11/2010  Coronary atherosclerosis of native coronary artery  CVA (cerebral infarction)  Depression 3/18/2014  Diverticulitis 11/19/2014  DVT (deep venous thrombosis) ([address] 1/1/2007  Dysarthria  Essential hypertension, benign  GERD (gastroesophageal reflux disease) 1/11/2010  
 History of blood transfusion 2/16/2016  History of cardiac pacemaker 2/14/2020  HLD (hyperlipidemia) 1/11/2010  
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  
Procedure Laterality Date  CARDIAC SURG PROCEDURE UNLIST    
 ablation  HX CATARACT REMOVAL    
 bilateral  
 HX CHOLECYSTECTOMY  HX OTHER SURGICAL    
 IVC filter  HX PACEMAKER    
 IR IVC FILTER PLACEMENT PERCUTANEOUS  2007 Family History Problem Relation Age of Onset [facility] Arthritis-osteo Mother  Hypertension Mother  Arthritis-osteo Father  Coronary Artery Disease Neg Hx Social History Socioeconomic History  Marital status:  Spouse name: Not on file  Number of children: Not on file  Years of education: Not on file  Highest education level: Not on file Tobacco Use  Smoking status: Never Smoker  Smokeless tobacco: Never Used Substance and Sexual Activity  Alcohol use: No  
  Alcohol/week: 0.0 standard drinks  Drug use: No  
 Sexual activity: Never Prior to Admission medications Medication Sig Start Date End Date Taking? Authorizing Provider  
metoprolol tartrate (LOPRESSOR) 50 mg tablet TAKE ONE TABLET BY MOUTH ONCE DAILY 2/12/20   Thierry Jon NP  
furosemide (LASIX) 20 mg tablet TAKE 1 TABLET BY MOUTH ONCE DAILY 9/10/19   Thierry Jon NP  
losartan (COZAAR) 50 mg tablet TAKE 1 TABLET BY MOUTH TWICE DAILY 9/10/19   Thierry Jon NP  
clobetasol (TEMOVATE) 0.05 % topical cream Apply  to affected area two (2) times a day. 2/21/19   Thierry Jon NP  
mupirocin (BACTROBAN) 2 % ointment Apply  to affected area daily. 2/21/19   Thierry Jon NP  
IRON, FERROUS SULFATE, 325 mg (65 mg iron) tablet TAKE 1 TABLET BY MOUTH ONCE DAILY BEFORE  BREAKFAST 2/9/19   Thierry Jon NP  
polyethylene glycol (MIRALAX) 17 gram/dose powder Take 17 g by mouth daily. 11/19/14   Amanda Tena DO  
senna-docusate (PERICOLACE) 8.6-50 mg per tablet Take 1 tablet by mouth daily. 11/6/14   Martha Estrada MD  
 
 
Allergies Allergen Reactions  Pcn [Penicillins] Hives Review of systems Unable to be performed secondary to patient's dementia Physical Exam:  
  
Visit Vitals BP (!) 192/99 (BP 1 Location: Left arm, BP Patient Position: At rest) Pulse 70 Temp 98.6 °F (37 °C) Resp 20 Ht 5' 1\" (1.549 m) Wt 54.4 kg (120 lb) SpO2 100% BMI 22.67 kg/m² Physical Exam: 
GENERAL: no distress, confused HEENT head is atraumatic, no ecchymosis or swelling detected over posterior C-spine, conjunctiva clear, pharynx clear, Speaks in isolated words fragmented phrases Chest heart rate regular 70, lungs are clear Abdomen soft, no tenderness elicited, bowel sounds positive Richardson drains clear rousseau urine Extremity calves are soft, good cap refill, feet are warm positive pulses, supine at rest patient does have right lower extremity shortening and adduction Lab/Data Review: 
Labs: Results:  
   
Chemistry Recent Labs  
  02/14/20 
0225 02/12/20 
0000 * 105*  144  
K 4.2 4.0  
 104 CO2 26 22 BUN 11 9* CREA 0.94 0.78  
CA 8.9 8.9 AGAP 8  --   
BUCR 12 12 AP 52 45  
TP 8.5* 7.3 ALB 3.5 3.8 GLOB 5.0*  --   
AGRAT 0.7* 1.1*  
  
CBC w/Diff Recent Labs  
  02/14/20 
0225 02/12/20 
0000 WBC 7.2 3.9  
RBC 4.30 3.75* HGB 12.8 11.1 HCT 39.0 35.6 PLT 35* 56* GRANS 87* 74 LYMPH 9*  --   
EOS 1 2 Coagulation No results for input(s): PTP, INR, APTT, INREXT in the last 72 hours. Iron/Ferritin No results for input(s): IRON in the last 72 hours. No lab exists for component: TIBCCALC BNP No results for input(s): BNPP in the last 72 hours. Cardiac Enzymes No results for input(s): CPK, CKND1, DAVIDA in the last 72 hours. No lab exists for component: Thomasena Boys Liver Enzymes Recent Labs  
  02/14/20 0225 TP 8.5* ALB 3.5 AP 52 SGOT 37 Thyroid Studies Lab Results Component Value Date/Time TSH 4.25 (H) 02/10/2015 11:40 AM  
    
 
All Micro Results None Imaging Reviewed: 
CT Results (most recent): 
Results from Hospital Encounter encounter on 02/13/20 CT LOW EXT RT WO CONT Narrative EXAM: CT of the pelvis without contrast 
 
INDICATION:  hip fracture TECHNIQUE: CT of  the pelvis without contrast. Sagittal and coronal reformations 
obtained. Additional small field-of-view images of the right hip obtained. All CT scans at this facility are performed using dose optimization technique as 
appropriate to a performed exam, to include automated exposure control, 
adjustment of the mA and/or kV according to patient size (including appropriate 
matching for site specific examination) or use of iterative reconstruction 
technique. COMPARISON: None FINDINGS:  
There is an impacted femoral neck fracture with mild displacement and no 
significant angulation. The remainder the osseous structures are without 
evidence of acute fracture. Osteopenia is present. Moderate degenerative changes noted in the left hip. Mild degenerative change of the pubic symphysis and 
sacroiliac joints. Impression IMPRESSION:  
1. Impacted right hip fracture. Assessment:  
Principal Problem: 
  Closed right hip fracture ([facility] [address] (2/14/2020) Active Problems: 
  HTN (hypertension) (1/11/2010) GERD (gastroesophageal reflux disease) (1/11/2010) Overview: Dr. Cantu Thrombocytopenia ([facility] (4/18/2015) Osteoporosis (1/4/2016) Monoclonal gammopathy of undetermined significance (6/5/2018) History of cardiac pacemaker (2/14/2020) Dementia ([facility] [address] (2/14/2020) Plan:  
Admit to telemetry NPO Parenteral fluids Ortho consulted from the ED Dr. Tiffanie Ibanez plans intervention late this afternoon Patient has significant thrombocytopenia  with h/o  
monoclonal gammopathy will need  5 units platelets pre-op Monitor CBC Case management consult DNR Indu Mast, DO 
2/14/2020, 6:11 AM 
 
Addendum 2/14/2020 11:56 AM 
Dr Arabella Grigsby has been consulted and recommended 2 units of platelets pre-op.   
 
Indu Mast,  
2/14/2020 11:56 AM

## 2020-02-15 NOTE — PROGRESS NOTES
Problem: Self Care Deficits Care Plan (Adult) Goal: *Acute Goals and Plan of Care (Insert Text) Outcome: Resolved/Not Met OCCUPATIONAL THERAPY EVALUATION/DISCHARGE Patient: Maddie Maria (89 y.o. female) Date: 2/15/2020 Primary Diagnosis: Closed right hip fracture ([facility] [address] [S72.001A] Procedure(s) (LRB): 
RIGHT HIP SCREW FIXATION/ CLOSED REDUCTION (Right) 1 Day Post-Op Precautions:Fall, WBAT, Skin, Other (comment)(cardiac) PLOF: she requires total assist for her self care at baseline, she does not shower (only sponge bathes); her daughter (who takes care of her is present). This patient did walk around the house with her walker. She reportedly fell at night when she got out of bed without supervision. ASSESSMENT AND RECOMMENDATIONS: 
Based on the objective data described below, the patient presents with out functional deficits; she was dependent at baseline. Will defer her functional mobility to PT; therefore, skilled occupational therapy is not indicated at this time. Discharge Recommendations: None Further Equipment Recommendations for Discharge: N/A   
 
SUBJECTIVE:  
Patient's daughter stated I just need her to walk again .  and \"I have been doing everything for her\" OBJECTIVE DATA SUMMARY:  
 
Past Medical History:  
Diagnosis Date Anemia 1/11/2010  
 thrombocytopenia Anxiety 1/11/2010 Atrial fibrillation ([facility] [address] 1/11/2010 Atrial flutter ([facility] [address] recurrent CAD (coronary artery disease) 8/3/2010 Cataract 1/11/2010 Coronary atherosclerosis of native coronary artery CVA (cerebral infarction) Dementia ([facility] [address] 2/14/2020 Depression 3/18/2014 Diverticulitis 11/19/2014 DVT (deep venous thrombosis) ([facility] [address] 1/1/2007 Dysarthria Essential hypertension, benign GERD (gastroesophageal reflux disease) 1/11/2010 History of blood transfusion 2/16/2016 History of cardiac pacemaker 2/14/2020 HLD (hyperlipidemia) 1/11/2010 HTN (hypertension) 1/11/2010 Intermediate coronary syndrome ([facility] [address] Nonspecific abnormal electrocardiogram (ECG) (EKG) Osteoporosis 1/4/2016 Pre-operative cardiovascular examination PSVT (paroxysmal supraventricular tachycardia) ([facility] [address] 1/11/2010 Pulmonary embolism ([facility] [address] 1/1/2007 Right knee pain 1/11/2010 Stroke 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  
Procedure Laterality Date CARDIAC SURG PROCEDURE UNLIST    
 ablation HX CATARACT REMOVAL    
 bilateral  
 HX CHOLECYSTECTOMY HX OTHER SURGICAL    
 IVC filter HX PACEMAKER    
 IR IVC FILTER PLACEMENT PERCUTANEOUS  2007 Barriers to Learning/Limitations: yes;  cognitive Compensate with: visual, verbal, tactile, kinesthetic cues/model Home Situation:  
  
[]     Right hand dominant   []     Left hand dominant Cognitive/Behavioral Status: 
Oriented to self only Skin: no skin integrity issues noted during OT evaluation Edema: no UE edema noted Vision/Perceptual:   
  Unable to follow out of context commands; appears to track people around the room Coordination: [facility] 
 [employer] [facility] Balance:  per PT Sitting: Intact; Without support Strength: BUE 
4/5 Tone & Sensation: [employer] Range of Motion: BUE 
[facility] JENNY/[facility] Functional Mobility and Transfers for ADLs: 
Bed Mobility: per PT Supine to Sit: Maximum assistance Sit to Supine: Maximum assistance; Total assistance ADL Assessment: 
 Self feeding: dependent (written on her white board for her to be fed per her daughter's request) Grooming: dependent UB bathing/dressing: dependent LB bathing/dressing: dependent Pain: 
Pain level pre-treatment: 0/10 Pain level post-treatment: 0/10 Activity Tolerance:  
No SOB noted Please refer to the flowsheet for vital signs taken during this treatment. After treatment:  
[]  Patient left in no apparent distress sitting up in chair [x]  Patient left in no apparent distress in bed 
[x]  Call bell left within reach 
[]  Nursing notified 
[x]  Her daughter and family members are present 
[]  Bed alarm activated COMMUNICATION/EDUCATION:  
[x]      Role of Occupational Therapy in the acute care setting 
[]      Home safety education was provided and the patient/caregiver indicated understanding. []      Patient/family have participated as able and agree with findings and recommendations. []      Patient is unable to participate in plan of care at this time. Thank you for this referral. 
Anthony Still, OTR/L Eval Complexity: History: LOW Complexity : Brief history review ; Examination: LOW Complexity : 1-3 performance deficits relating to physical, cognitive , or psychosocial skils that result in activity limitations and / or participation restrictions ; Decision Making:LOW Complexity : No comorbidities that affect functional and no verbal or physical assistance needed to complete eval tasks

## 2020-02-15 NOTE — PROGRESS NOTES
PROGRESS NOTE Patient: Alina Shaffer  MRN: [id number]  SSN: [id number] YOB: 1927   Age: 80 y.o. Sex: female Admit Date: 2/13/2020  LOS:  LOS: 2 days POD # 2 S/P Procedure(s): RIGHT HIP SCREW FIXATION/ CLOSED REDUCTION Case discussed with:  []Patient  []Family  []Nursing  []Case Management ASSESSMENT Alina Shaffer IS A 80 y.o. old who is resting at this time. She is comfortable PLAN  
  
1) Orthopaedics Dx:  Right femoral neck fracture · Pain Management:   Per IM/ medical team  
· Antibiotics: Antibiotics: Complete as ordered DVT Prophylaxis: SCD//     
       Chemo Prophylaxis:  Per Medical Team DVT Prophylaxis: · Weight Bear status:  See Dr Aileen Russell PT order · Consults: PT/OT/ Intervention/ Consults: 
  PT/OT : [x]PT / [] OT ordered //    
  DC disposition: TBD,   
  []  []  [] [facility] ·  Consultations//Orthopaedic Services following Alina Shaffer include Consultants following patients:   Alina Shaffer is on medicine service with Ortho consultation · Indwelling Catheters: IV : present, Drains: not present, Richardson:present Medical Diagnosis :  
  ICD-10-CM ICD-9-CM 1. Closed fracture of right hip, initial encounter ([facility] S72.001A 820.8 Patient Active Problem List  
Diagnosis Code  
 HTN (hypertension) I10  
 GERD (gastroesophageal reflux disease) K21.9  Anxiety F41.9  DVT (deep venous thrombosis) ([facility]) I82.409  Anemia D64.9  
 HLD (hyperlipidemia) E78.5  Atrial fibrillation ([facility]) I48.91  
 PSVT (paroxysmal supraventricular tachycardia) ([facility]) I47.1  Syncope R55  Cataract H26.9  Right knee pain M25.561  
 CAD (coronary artery disease) I25.10  Hypertrophic cardiomyopathy (HCC) I42.2  Cardiomegaly I51.7  Palpitations R00.2  Atrial flutter ([facility] I48.92  
  Coronary atherosclerosis of native coronary artery I25.10  Essential hypertension, benign I10  
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  
 Intermediate coronary syndrome (HCC) I20.0  Depression F32.9  GI bleed K92.2  Diverticulitis K57.92  
 S/P ablation operation for arrhythmia Z98.890, Z86.79  
 CVA (cerebral infarction) I63.9  Acute right-sided weakness R53.1  Thrombocytopenia ([facility] [address] D69.6  Osteoporosis M81.0  Acute GI bleeding K92.2  Acute blood loss anemia D62  Diverticulosis K57.90  
 History of blood transfusion Z92.89  Lower GI bleed K92.2  Advance directive discussed with patient Z70.80  
 Monoclonal gammopathy of undetermined significance D47.2  Closed right hip fracture ([facility] [address] S72.001A  History of cardiac pacemaker Z95.0  Dementia ([facility] [address] F03.90 · Code Status: DNR 
 
    
SUBJECTIVE Patient seen and evaluated. She has dementia. OBJECTIVE EXAMINATION Visit Vitals BP (!) 161/95 (BP 1 Location: Right arm, BP Patient Position: At rest) Pulse 71 Temp 97.9 °F (36.6 °C) Resp 16 Ht 5' 1\" (1.549 m) Wt 120 lb (54.4 kg) SpO2 98% BMI 22.67 kg/m² Intake/Output Summary (Last 24 hours) at 2/16/2020 3801 Last data filed at 2/16/2020 2806 Gross per 24 hour Intake 460 ml Output  Net 460 ml Appearance: alert, no distress Khoa Confer yes dementia Psychiatric: Affect and mood are appropriate. Respiratory: Breathing is unlabored without accessory chest muscle use EXTREMITY :    RIGHT  HIP 
 
DRESSINGS: Clean,dry , intact INCISIONS:  dressings covering the incision  not assessed) Wound: no wound present Extremities:        No embolic phenomena to the toes Pulses in tact to the right foot Lower extremities are warm and appear well perfused DVT: No evidence of DVT seen on examination at this time LABS AND MEDICATION REVIEW  
 
  
 CMP:  
No results found for: NA, K, CL, CO2, AGAP, GLU, BUN, CREA, GFRAA, GFRNA, CA, MG, PHOS, ALB, TBIL, TP, ALB, GLOB, AGRAT, SGOT, ALT, GPT 
CBC:  
Lab Results Component Value Date/Time WBC 5.5 02/16/2020 06:05 AM  
 HGB 10.2 (L) 02/16/2020 06:05 AM  
 HCT 31.8 (L) 02/16/2020 06:05 AM  
 PLT 48 (L) 02/16/2020 06:05 AM  
  
 
Current Facility-Administered Medications Medication Dose Route Frequency  docusate sodium (COLACE) capsule 100 mg  100 mg Oral BID  acetaminophen (TYLENOL) suppository 650 mg  650 mg Rectal Q4H PRN  
 diphenhydrAMINE (BENADRYL) injection 25 mg  25 mg IntraVENous Q4H PRN  
 ondansetron (ZOFRAN) injection 4 mg  4 mg IntraVENous Q4H PRN  
 bisacodyL (DULCOLAX) suppository 10 mg  10 mg Rectal DAILY PRN  
 losartan (COZAAR) tablet 50 mg  50 mg Oral DAILY  metoprolol tartrate (LOPRESSOR) tablet 50 mg  50 mg Oral DAILY  morphine injection 2 mg  2 mg IntraVENous Q4H PRN  
 naloxone (NARCAN) injection 0.4 mg  0.4 mg IntraVENous EVERY 2 MINUTES AS NEEDED REVIEW OF SYSTEMS : 2/16/2020  ALL BELOW ARE Negative except : SEE HPI Past Medical History:  
Diagnosis Date  Anemia 1/11/2010  
 thrombocytopenia  Anxiety 1/11/2010  Atrial fibrillation ([facility] [address] 1/11/2010  Atrial flutter ([facility] [address] recurrent  CAD (coronary artery disease) 8/3/2010  Cataract 1/11/2010  Coronary atherosclerosis of native coronary artery  CVA (cerebral infarction)  Dementia ([facility] [address] 2/14/2020  Depression 3/18/2014  Diverticulitis 11/19/2014  DVT (deep venous thrombosis) ([facility] [address] 1/1/2007  Dysarthria  Essential hypertension, benign  GERD (gastroesophageal reflux disease) 1/11/2010  
 History of blood transfusion 2/16/2016  History of cardiac pacemaker 2/14/2020  HLD (hyperlipidemia) 1/11/2010  
 HTN (hypertension) 1/11/2010  Intermediate coronary syndrome ([facility]  Nonspecific abnormal electrocardiogram (ECG) (EKG)  Osteoporosis 1/4/2016  Pre-operative cardiovascular examination  PSVT (paroxysmal supraventricular tachycardia) ([facility] [address] 1/11/2010  Pulmonary embolism ([facility] [address] 1/1/2007  Right knee pain 1/11/2010  Stroke ([facility] [facility]  Syncope 5/13/2009  Thrombocytosis ([facility]  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  
Procedure Laterality Date  CARDIAC SURG PROCEDURE UNLIST    
 ablation  HX CATARACT REMOVAL    
 bilateral  
 HX CHOLECYSTECTOMY  HX OTHER SURGICAL    
 IVC filter  HX PACEMAKER    
 IR IVC FILTER PLACEMENT PERCUTANEOUS  2007 Social History Socioeconomic History  Marital status:  Spouse name: Not on file  Number of children: Not on file  Years of education: Not on file  Highest education level: Not on file Occupational History  Not on file Social Needs  Financial resource strain: Not on file  Food insecurity:  
  Worry: Not on file Inability: Not on file  Transportation needs:  
  Medical: Not on file Non-medical: Not on file Tobacco Use  Smoking status: Never Smoker  Smokeless tobacco: Never Used Substance and Sexual Activity  Alcohol use: No  
  Alcohol/week: 0.0 standard drinks  Drug use: No  
 Sexual activity: Never Lifestyle  Physical activity:  
  Days per week: Not on file Minutes per session: Not on file  Stress: Not on file Relationships  Social connections:  
  Talks on phone: Not on file Gets together: Not on file Attends Orthodoxy service: Not on file Active member of club or organization: Not on file Attends meetings of clubs or organizations: Not on file Relationship status: Not on file  Intimate partner violence:  
  Fear of current or ex partner: Not on file Emotionally abused: Not on file Physically abused: Not on file Forced sexual activity: Not on file Other Topics Concern  Not on file Social History Narrative  Not on file Family History Problem Relation Age of Onset [employer] Arthritis-osteo Mother  Hypertension Mother  Arthritis-osteo Father  Coronary Artery Disease Neg Hx Prior to Admission medications Medication Sig Start Date End Date Taking? Authorizing Provider  
metoprolol tartrate (LOPRESSOR) 50 mg tablet TAKE ONE TABLET BY MOUTH ONCE DAILY 2/12/20   Coreen Jon NP  
furosemide (LASIX) 20 mg tablet TAKE 1 TABLET BY MOUTH ONCE DAILY 9/10/19   Coreen Jon NP  
losartan (COZAAR) 50 mg tablet TAKE 1 TABLET BY MOUTH TWICE DAILY 9/10/19   Coreen Jon NP  
clobetasol (TEMOVATE) 0.05 % topical cream Apply  to affected area two (2) times a day. 2/21/19   Coreen Jon NP  
mupirocin (BACTROBAN) 2 % ointment Apply  to affected area daily. 2/21/19   Coreen Jon NP  
IRON, FERROUS SULFATE, 325 mg (65 mg iron) tablet TAKE 1 TABLET BY MOUTH ONCE DAILY BEFORE  BREAKFAST 2/9/19   Coreen Jon NP  
polyethylene glycol (MIRALAX) 17 gram/dose powder Take 17 g by mouth daily. 11/19/14   DO tg [place]-docusate (PERICOLACE) 8.6-50 mg per tablet Take 1 tablet by mouth daily. 11/6/14   Yoel Tubbs MD  
 
Current Facility-Administered Medications Medication Dose Route Frequency  docusate sodium (COLACE) capsule 100 mg  100 mg Oral BID  acetaminophen (TYLENOL) suppository 650 mg  650 mg Rectal Q4H PRN  
 diphenhydrAMINE (BENADRYL) injection 25 mg  25 mg IntraVENous Q4H PRN  
 ondansetron (ZOFRAN) injection 4 mg  4 mg IntraVENous Q4H PRN  
 bisacodyL (DULCOLAX) suppository 10 mg  10 mg Rectal DAILY PRN  
 losartan (COZAAR) tablet 50 mg  50 mg Oral DAILY  metoprolol tartrate (LOPRESSOR) tablet 50 mg  50 mg Oral DAILY  morphine injection 2 mg  2 mg IntraVENous Q4H PRN  
  naloxone (NARCAN) injection 0.4 mg  0.4 mg IntraVENous EVERY 2 MINUTES AS NEEDED Allergies Allergen Reactions  Pcn [Penicillins] Hives DIAGNOSTIC IMAGING Riya Garg PA-C 
2/16/2020 
8:05 AM

## 2020-02-15 NOTE — PROGRESS NOTES
Problem: Mobility Impaired (Adult and Pediatric) Goal: *Acute Goals and Plan of Care (Insert Text) Description Physical Therapy Goals Initiated 2/15/2020 and to be accomplished within 5-7 day(s) 1. Patient will move from supine to sit and sit to supine  in bed with moderate assistance . 2.  Patient will transfer from bed to chair and chair to bed with moderate assistance  using the least restrictive device. 3.  Patient will perform sit to stand with moderate assistance . 4. Patient will ambulate with moderate assistance  for 15 feet with the least restrictive device. PLOF: UA to obtain Outcome: Progressing Towards Goal 
 PHYSICAL THERAPY EVALUATION Patient: Marian Arias (48 y.o. female) Date: 2/15/2020 Primary Diagnosis: Closed right hip fracture ([facility] [address] [S72.001A] Procedure(s) (LRB): 
RIGHT HIP SCREW FIXATION/ CLOSED REDUCTION (Right) 1 Day Post-Op Precautions:    Fall, WBAT, Skin, Other (comment)(cardiac) WBAT 
PLOF: UA to obtain ASSESSMENT : 
Based on the objective data described below, the patient presents to skilled PT with decrease I all areas of bed mobility, transfers, functional mobility. She is confused and UA to correctly answer orientation questions. Her communication is unintelligible. She was supine in bed. She was max A for supine to sit on EOB. Once sitting on EOB she sat unsupported for approx 4 minutes and demonstrated AROM left LE/AAROM right LE. She visually had increased pain from when I arrived and with movement/position change to sitting on the EOB however she is UA to provide a numeric scale. She became what appeared to be crying/weepy and she was returned to supine with max / total assist. Sequential pumps and heel float booties B reapplied. Patient will benefit from skilled intervention to address the above impairments. Patient's rehabilitation potential is considered to be Fair Factors which may influence rehabilitation potential include: []         None noted 
[x]         Mental ability/status []         Medical condition 
[]         Home/family situation and support systems 
[]         Safety awareness 
[]         Pain tolerance/management 
[]         Other: PLAN : 
Recommendations and Planned Interventions:   
[x]           Bed Mobility Training             [x]    Neuromuscular Re-Education 
[x]           Transfer Training                   []    Orthotic/Prosthetic Training 
[x]           Gait Training                          []    Modalities [x]           Therapeutic Exercises           []    Edema Management/Control 
[x]           Therapeutic Activities            [x]    Family Training/Education 
[x]           Patient Education 
[]           Other (comment): Frequency/Duration: Patient will be followed by physical therapy 1-2 times per day/4-7 days per week to address goals. Discharge Recommendations: Roman Nguyen Further Equipment Recommendations for Discharge: N/A  
 
SUBJECTIVE:  
Patient stated:  Unintelligable. OBJECTIVE DATA SUMMARY:  
 
Past Medical History:  
Diagnosis Date Anemia 1/11/2010  
 thrombocytopenia Anxiety 1/11/2010 Atrial fibrillation ([facility] [address] 1/11/2010 Atrial flutter ([facility] [address] recurrent CAD (coronary artery disease) 8/3/2010 Cataract 1/11/2010 Coronary atherosclerosis of native coronary artery CVA (cerebral infarction) Dementia ([facility] [address] 2/14/2020 Depression 3/18/2014 Diverticulitis 11/19/2014 DVT (deep venous thrombosis) ([facility] [address] 1/1/2007 Dysarthria Essential hypertension, benign GERD (gastroesophageal reflux disease) 1/11/2010 History of blood transfusion 2/16/2016 History of cardiac pacemaker 2/14/2020 HLD (hyperlipidemia) 1/11/2010 HTN (hypertension) 1/11/2010 Intermediate coronary syndrome ([address] Nonspecific abnormal electrocardiogram (ECG) (EKG) Osteoporosis 1/4/2016 Pre-operative cardiovascular examination PSVT (paroxysmal supraventricular tachycardia) ([facility] [address] 1/11/2010 Pulmonary embolism ([facility] [address] 1/1/2007 Right knee pain 1/11/2010 Stroke [facility]) Syncope 5/13/2009 Thrombocytosis ([facility] [address] 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  
Procedure Laterality Date CARDIAC SURG PROCEDURE UNLIST    
 ablation HX CATARACT REMOVAL    
 bilateral  
 HX CHOLECYSTECTOMY HX OTHER SURGICAL    
 IVC filter HX PACEMAKER    
 IR IVC FILTER PLACEMENT PERCUTANEOUS  2007 Barriers to Learning/Limitations: yes;  altered mental status (i.e.Sedation, Confusion) Compensate with: Visual Cues, Verbal Cues, and Tactile Cues Home Situation: 
Critical Behavior: 
Neurologic State: Sleeping Orientation Level: Unable to verbalize Cognition: Decreased command following;Decreased attention/concentration; Unable to assess (comment) Psychosocial 
Patient Behaviors: Altered mental status;Confused;Flat affect Purposeful Interaction: Yes(limited eval) Reassure: Prayer Strength:   
Strength: Generally decreased, functional(B LE, P/O right hip) Tone & Sensation:  
Range Of Motion: 
AROM: Generally decreased, functional(B LE, P/O RIght hip) Posture:  
Functional Mobility: 
Bed Mobility: 
Supine to Sit: Maximum assistance Sit to Supine: Maximum assistance; Total assistance Transfers:  
Balance:  
Sitting: Intact; Without support Wheelchair Mobility:  
Ambulation/Gait Training:  
Right Side Weight Bearing: As tolerated Stairs: Therapeutic Exercises:  
  
Pain:VISUAL with movement and position change Pain level pre-treatment: 4/10 Pain level post-treatment: 4/10 Pain Intervention(s) : Medication (see MAR); Rest, Ice, Repositioning Response to intervention: Nurse notified, See doc flow Activity Tolerance:  
Fairly, limited eval due to confusion Please refer to the flowsheet for vital signs taken during this treatment. After treatment: []         Patient left in no apparent distress sitting up in chair 
[x]         Patient left in no apparent distress in bed 
[x]         Call bell left within reach 
[]         Nursing notified 
[]         Caregiver present 
[]         Bed alarm activated 
[x]         SCDs applied COMMUNICATION/EDUCATION:  
[x]         Role of Physical Therapy in the acute care setting. []         Fall prevention education was provided and the patient/caregiver indicated understanding. []         Patient/family have participated as able in goal setting and plan of care. []         Patient/family agree to work toward stated goals and plan of care. []         Patient understands intent and goals of therapy, but is neutral about his/her participation. [x]         Patient is unable to participate in goal setting/plan of care: ongoing with therapy staff. 
[]         Other: Thank you for this referral. 
Cynthia Wang, PT Time Calculation: 18 mins Eval Complexity: History: HIGH Complexity :3+ comorbidities / personal factors will impact the outcome/ POC Exam:MEDIUM Complexity : 3 Standardized tests and measures addressing body structure, function, activity limitation and / or participation in recreation  Presentation: LOW Complexity : Stable, uncomplicated  Clinical Decision Making:High Complexity level of assistance with functional mobility  Overall Complexity:LOW

## 2020-02-15 NOTE — ROUTINE PROCESS
Bedside and Verbal shift change report given to Zamzam Begum RN (oncoming nurse) by Katlin Zaidi and Lainey Syed RN (offgoing nurse). Report included the following information Kardex and MAR.  
 
0700 End of Shift Note Bedside and verbal shift change report given to Gabriella Pandey RN (On coming nurse) by Zamzam Begum RN (Off going nurse). Report included the following information:  
   --Procedure Summary 
   --MAR, 
   --Recent Results --Med Rec Status SBAR Recommendations: none Issues for Provider to address none Activity This Shift [x] Bed Rest Order 
 [] Refused 
 [] Dangled  
 [] TDWB Ambulating: 
   [] Bathroom [] BSC [] Room/Hallway Up in Chair for meals []Yes [x] No  
Voiding       [] Yes  [x] No 
Richardson          [x] Yes  [] No 
Incontinent [] Yes  [x] No 
 
DUE TO VOID  POUR        [] Yes [x] No 
Purewick    [] Yes [x] No 
New Onset [] Yes [x] No Straight Cath   []Yes  [x] No 
Condom Cath  [] Yes [x] No 
MD Called      [] Yes  [x] No  
Blood Sugars Managed []Yes [x] No   
Bowels Moved [] Yes [x] No 
 
Incontinent     [] Yes [x] No Passed Gas [x]Yes [] No 
 
New Onset  []Yes [x] No 
  
 
 MD Called []Yes  [x] No 
  
CHG Bath Done Before Surgery After Surgery  
  
[x] Yes  [] No 
[x] Yes  [] No   
  
Drain Removed [] Yes  [x] No [] N/A Dressing Changed [] Yes   [x] No [] N/A Nausea/Vomiting [] Yes   [x] No    
Ice Packs Changed [x] Yes   [] No  [] N/A Incentive Spirometer  [x] Yes  [] No     
SCD Pumps On Ankle Pumping  [x] Yes   [] No  
  
[] Yes   [x] No    
  
Telemetry Monitoring [x] Yes   [] No   Rhythm NSR

## 2020-02-15 NOTE — PROGRESS NOTES
.assumed care of patient, see admission assessment. Patient speech incomprehensible drooling noted. Patient alert to self only. Patient does not follow commands will open eyes to voice. VSS. Respirations even and unlabored on 3L/NC. HOB elevated 30 degrees. Pedal pulses palpable patient wiggles toes at intervals. Heel booties applied for protection. Heels feel boggy bilaterally. Richardson draining without difficulty. Andriette Spearing at bedside states patient has had stroke and has dementia. Patient mumbles and then giggles. Patient does not appear to be in pain Dressing to right thigh with minimal bloody drainage noted. Patient completely bathed and gown changed. Iv infusing without difficulty.

## 2020-02-15 NOTE — PROGRESS NOTES
[facility] Hospitalist Group Progress Note Patient: Cassia Enamorado Age: 80 y.o. : 1927 MR#: [id number] SSN: [id number] Date/Time: 2/15/2020 Subjective: Patient alert awake, confused but pleasant. Denies any complaints. Assessment/Plan: 1. Closed right hip fracture s/p ORIF by Ortho, plan per Ortho team. 
2. HTN: BP stable, continue metoprolol and Cozaar 3. Chronic Thrombocytopenia: Status post 2 units of platelet transfusion prior to surgery, no signs of bleeding, hematology input noted 4. Osteoporosis:  
5. Monoclonal gammopathy of undetermined significance: 6. History of cardiac pacemake 7. Dementia: Supportive care 8. DNR Discussed with patient's daughter Ken Trent in detail, family wants patient to go to SNF for the discharge. Discussed with RN 
 
I spent 40 minutes with the patient in face-to-face consultation, of which greater than 50% was spent in counseling and coordination of care as described above. Case discussed with:  [x]Patient  [x]Family  [x]Nursing  []Case Management DVT Prophylaxis:  []Lovenox  []Hep SQ  [x]SCDs  []Coumadin   []Eliquis/Xarelto Objective:  
VS:  
Visit Vitals /68 (BP 1 Location: Left arm) Pulse 71 Temp 98.5 °F (36.9 °C) Resp 16 Ht 5' 1\" (1.549 m) Wt 54.4 kg (120 lb) SpO2 97% BMI 22.67 kg/m² Tmax/24hrs: Temp (24hrs), Av.4 °F (36.3 °C), Min:97 °F (36.1 °C), Max:98.5 °F (36.9 °C) IOBRIEF Intake/Output Summary (Last 24 hours) at 2/15/2020 1403 Last data filed at 2/15/2020 1245 Gross per 24 hour Intake 960 ml Output 720 ml Net 240 ml General:  Alert, cooperative, no acute distress Pulmonary:  CTA Bilaterally. No Wheezing/Rales. Cardiovascular: Regular rate and Rhythm. GI:  Soft, Non distended, Non tender. + Bowel sounds. Extremities:  No edema. Right knee. Dressing clean, no swelling or no bleeding. Neurologic: Alert and oriented X 1. Moves all ext. Medications: Current Facility-Administered Medications Medication Dose Route Frequency  acetaminophen (TYLENOL) suppository 650 mg  650 mg Rectal Q4H PRN  
 diphenhydrAMINE (BENADRYL) injection 25 mg  25 mg IntraVENous Q4H PRN  
 ondansetron (ZOFRAN) injection 4 mg  4 mg IntraVENous Q4H PRN  
 bisacodyL (DULCOLAX) suppository 10 mg  10 mg Rectal DAILY PRN  
 losartan (COZAAR) tablet 50 mg  50 mg Oral DAILY  metoprolol tartrate (LOPRESSOR) tablet 50 mg  50 mg Oral DAILY  polyethylene glycol (MIRALAX) packet 17 g  17 g Oral DAILY  morphine injection 2 mg  2 mg IntraVENous Q4H PRN  
 naloxone (NARCAN) injection 0.4 mg  0.4 mg IntraVENous EVERY 2 MINUTES AS NEEDED  clindamycin phosphate (CLEOCIN) 600 mg in 0.9% sodium chloride 100 mL IVPB  600 mg IntraVENous Q8H Labs:   
Recent Results (from the past 24 hour(s)) METABOLIC PANEL, BASIC Collection Time: 02/15/20  5:54 AM  
Result Value Ref Range Sodium 143 136 - 145 mmol/L Potassium 3.9 3.5 - 5.5 mmol/L Chloride 108 100 - 111 mmol/L  
 CO2 29 21 - 32 mmol/L Anion gap 6 3.0 - 18 mmol/L Glucose 97 74 - 99 mg/dL BUN 17 7.0 - 18 MG/DL Creatinine 0.96 0.6 - 1.3 MG/DL  
 BUN/Creatinine ratio 18 12 - 20 GFR est AA >60 >60 ml/min/1.73m2 GFR est non-AA 54 (L) >60 ml/min/1.73m2 Calcium 7.9 (L) 8.5 - 10.1 MG/DL  
CBC WITH AUTOMATED DIFF Collection Time: 02/15/20  5:54 AM  
Result Value Ref Range WBC 5.7 4.6 - 13.2 K/uL  
 RBC 3.33 (L) 4.20 - 5.30 M/uL  
 HGB 10.0 (L) 12.0 - 16.0 g/dL HCT 30.6 (L) 35.0 - 45.0 % MCV 91.9 74.0 - 97.0 FL  
 MCH 30.0 24.0 - 34.0 PG  
 MCHC 32.7 31.0 - 37.0 g/dL  
 RDW 16.4 (H) 11.6 - 14.5 % PLATELET 64 (L) 422 - 420 K/uL MPV 11.8 9.2 - 11.8 FL  
 NEUTROPHILS 81 (H) 40 - 73 % LYMPHOCYTES 10 (L) 21 - 52 % MONOCYTES 7 3 - 10 % EOSINOPHILS 2 0 - 5 % BASOPHILS 0 0 - 2 %  
 ABS. NEUTROPHILS 4.6 1.8 - 8.0 K/UL  
 ABS. LYMPHOCYTES 0.6 (L) 0.9 - 3.6 K/UL ABS. MONOCYTES 0.4 0.05 - 1.2 K/UL  
 ABS. EOSINOPHILS 0.1 0.0 - 0.4 K/UL  
 ABS. BASOPHILS 0.0 0.0 - 0.1 K/UL  
 DF AUTOMATED Signed By: Gaudencio Escobar MD   
 February 15, 2020 Disclaimer: Sections of this note are dictated using utilizing voice recognition software. Minor typographical errors may be present. If questions arise, please do not hesitate to contact me or call our department.

## 2020-02-15 NOTE — PROGRESS NOTES
End of Shift Note Bedside and verbal shift change report given to Reymundo Estrada (On coming nurse) by Rosita Still (Off going nurse). Report included the following information:  
   --Procedure Summary 
   --MAR, 
   --Recent Results --Med Rec Status SBAR Recommendations:  
 
Issues for Provider to address Activity This Shift [x] Bed Rest Order 
 [] Refused 
 [] Dangled  
 [] TDWB Ambulating: 
   [] Bathroom [] BSC [] Room/Hallway Up in Chair for meals []Yes [] No  
Richardson POUR        [] Yes [] No 
Purewick    [] Yes [] No 
New Onset [] Yes [] No Straight Cath   []Yes  [] No 
Condom Cath  [] Yes [] No 
MD Called      [] Yes  [] No  
Blood Sugars Managed []Yes [x] No   
Bowels Moved [] Yes [x] No 
 
Incontinent     [] Yes [] No Passed Gas []Yes [] No 
 
New Onset  []Yes [] No 
  
 
 MD Called []Yes  [] No 
  
CHG Bath Done Before Surgery After Surgery  
  
[] Yes  [] No 
[] Yes  [] No   
  
Drain Removed [] Yes  [] No [x] N/A Dressing Changed [] Yes   [] No [x] N/A Nausea/Vomiting [] Yes   [x] No    
Ice Packs Changed [x] Yes   [] No  [] N/A Incentive Spirometer  [] Yes  [x] No     
SCD Pumps On Ankle Pumping  [x] Yes   [] No  
  
[] Yes   [x] No    
  
Telemetry Monitoring [] Yes   [x] No   Rhythm

## 2020-02-15 NOTE — ROUTINE PROCESS
Bedside and Verbal shift change report given to PACO Hammond (oncoming nurse) by Wendy Marroquin (offgoing nurse). Report included the following information SBAR, Kardex, Intake/Output, MAR and Recent Results.

## 2020-02-15 NOTE — PROGRESS NOTES
Problem: Falls - Risk of 
Goal: *Absence of Falls Description Document Rigoberto Suarez Fall Risk and appropriate interventions in the flowsheet. Outcome: Progressing Towards Goal 
Note: Fall Risk Interventions: 
  
 
Mentation Interventions: Adequate sleep, hydration, pain control Medication Interventions: Bed/chair exit alarm Elimination Interventions: Toileting schedule/hourly rounds History of Falls Interventions: Investigate reason for fall

## 2020-02-15 NOTE — OP NOTES
[address] [place] [address]  
OPERATIVE REPORT Name:  Rosalba Faulkner 
MR#:   [id number] :  1927 ACCOUNT #:  [de-identified] DATE OF SERVICE:  2020 PREOPERATIVE DIAGNOSIS:  Right hip fracture, femoral neck. POSTOPERATIVE DIAGNOSIS:  Right hip fracture, femoral neck. PROCEDURE PERFORMED:  Right hip percutaneous screw fixation. SURGEON:  Dominic Ferguson DO 
 
ASSISTANT:  Milka Sotomayor. ANESTHESIA:  General. 
 
COMPLICATIONS:  None. SPECIMENS REMOVED:  None. IMPLANTS:  Cannulated screws x3, two of 80 mm length and one of 85 mm length. ESTIMATED BLOOD LOSS:  Minimal. 
 
FINDINGS:  Nondisplaced femoral neck fracture. Informed consent was obtained from the patient's daughter. The risks and benefits of procedure were discussed. They include but are not limited to neurovascular injury, blood loss, fracture, malunion, nonunion, need for further surgery, hardware breakage, failure of fixation, chronic pain, and complications from anesthesia including death. Additionally, she has an added risk of thrombocytopenia that was also discussed as a risk. She was given platelets prior to surgery, which has slightly decreased this risk, but she is still at moderate risk for surgery. PROCEDURE:  After informed consent was obtained from the patient, she was taken back to the operative suite and transferred to the fracture table. Fluoroscopy was used to confirm the position of the fracture to be stable. The right lower extremity was prepped and draped in the normal sterile fashion. Focal incisions were made at the area of the starting points. First, the inferior pin was placed in the central portion in the neck. After this was confirmed on fluoroscopy in AP and lateral, attention was turned to the superior posterior pin. This was also confirmed on fluoroscopy after it was inserted into the femoral head.   After this, an additional pin was placed in the superior anterior portion of the femoral neck and was also confirmed on fluoroscopy. At this point, small stab incisions were made around the pins, and a short-thread cannulated screw in length of 85 mm was used for the inferior portion and was passed just past the fracture site at this point in sequential fashion. The superior posterior and superior anterior screws were also passed just past the fracture. This was done on power. After passing on power, they were finished into the subchondral bone of the femoral head by hand. The wounds were copiously irrigated, and final images were taken and saved. The wound was closed with 2-0 Vicryl followed by staples. The patient was sent to recovery in stable condition and was started on a regular diet. She will start on DVT prophylaxis per Medicine tomorrow. The patient will need physical therapy, discharge planning, and occupational therapy. Lissette Avila DO 
 
 
[id number] 
D:  02/14/2020 16:16 
T:  02/15/2020 0:55 JOB #:  [id number]

## 2020-02-15 NOTE — PROGRESS NOTES
Reason for Admission:  Closed right hip fracture ([address] Vani Puga RRAT Score:    18% Plan for utilizing home health:    [facility] vs SNF Likelihood of Readmission:   LOW Transition of Care Plan:         
 
 
Initial assessment completed with daughterVin ([phone]). Cognitive status of patient: disoriented. Face sheet information confirmed:  yes. The patient designates daughterVin to participate in her discharge plan and to receive any needed information. This patient lives in a single family home with patient and daughter. Patient is not able to navigate steps as needed. Prior to hospitalization, patient was considered to be independent with ADLs/IADLS : no . If not independent,  patient needs assist with : dressing, bathing, food preparation, cooking, toileting and grooming Patient has a current ACP document on file: yes The patient and daughter will be available to transport patient home upon discharge. The patient already has [facility], [facility], and  medical equipment available in the home. Patient is not currently active with home health. Patient has not stayed in a skilled nursing facility or rehab. This patient is on dialysis :no 
 
List of available SNF agencies were provided and reviewed with the patient prior to discharge. Freedom of choice signed: no, for . Currently, the discharge plan is SNF. The patient states that she can obtain her medications from the pharmacy, and take her medications as directed. Patient's current insurance is [employer] and Medicaid. Pt lives with daughter in single story home. Pts daughter states she has a cane and walker but tends to \"furniture surf\" instead. Pts daughter states patient needs assist with \"everything\". Pt can return home when ready. Daughter needs to make SNF choices and sign FOC. Care Management Interventions PCP Verified by CM:  Yes 
 Last Visit to PCP: 02/05/20 Mode of Transport at Discharge: BLS Transition of Care Consult (CM Consult): SNF, Discharge Planning Discharge Durable Medical Equipment: No 
Physical Therapy Consult: Yes Occupational Therapy Consult: Yes Current Support Network: Lives with Caregiver(daughter is her caregiver) Confirm Follow Up Transport: Family The Plan for Transition of Care is Related to the Following Treatment Goals : SNF vs home with home health The Patient and/or Patient Representative was Provided with a Choice of Provider and Agrees with the Discharge Plan?: Yes Freedom of Choice List was Provided with Basic Dialogue that Supports the Patient's Individualized Plan of Care/Goals, Treatment Preferences and Shares the Quality Data Associated with the Providers?: No(list given to daughter, left before giving me her three choices or signing [facility]) Discharge Location Discharge Placement: Skilled nursing facility Mary Ellen Bruner RN - Outcomes Manager  [phone]

## 2020-02-16 NOTE — ROUTINE PROCESS
Bladder scan= 250. Patient has not voided since castaneda d/c'd. No painful bladder distention noted. Spoke with Roma Bosworth and will give patient a few more hours to void. Straight cath if painful distention, unable to void and bladder scan =/>300cc.

## 2020-02-16 NOTE — ROUTINE PROCESS
Bedside and Verbal shift change report given to PACO Hammond (oncoming nurse) by Nathaniel Haile (offgoing nurse). Report included the following information SBAR, Kardex, Procedure Summary, Intake/Output, MAR and Recent Results.

## 2020-02-16 NOTE — PROGRESS NOTES
[facility] Hospitalist Group Progress Note Patient: Felix Wheeler Age: 80 y.o. : 1927 MR#: [id number] SSN: [id number] Date/Time: 2020 Subjective: Patient alert awake, confused but pleasant. Denies any complaints. Assessment/Plan: 1. Closed right hip fracture s/p ORIF by Ortho, plan per Ortho team. 
2. HTN: BP stable, continue metoprolol and Cozaar 3. Chronic Thrombocytopenia: Status post 2 units of platelet transfusion prior to surgery, no signs of bleeding, hematology input noted 4. Osteoporosis:  
5. Monoclonal gammopathy of undetermined significance: 6. History of cardiac pacemake 7. Dementia: Supportive care 8. DNR 
SNF for discharge Possible can be discharged tomorrow Discussed with RN Case discussed with:  [x]Patient  [x]Family  [x]Nursing  []Case Management DVT Prophylaxis:  []Lovenox  []Hep SQ  [x]SCDs  []Coumadin   []Eliquis/Xarelto Objective:  
VS:  
Visit Vitals /74 (BP 1 Location: Right arm, BP Patient Position: At rest) Pulse 70 Temp 97 °F (36.1 °C) Resp 14 Ht 5' 1\" (1.549 m) Wt 54.4 kg (120 lb) SpO2 95% BMI 22.67 kg/m² Tmax/24hrs: Temp (24hrs), Av °F (36.7 °C), Min:97 °F (36.1 °C), Max:98.9 °F (37.2 °C) IOBRIEF Intake/Output Summary (Last 24 hours) at 2020 1300 Last data filed at 2020 2620 Gross per 24 hour Intake 320 ml Output 200 ml Net 120 ml General:  Alert, cooperative, no acute distress Pulmonary:  CTA Bilaterally. No Wheezing/Rales. Cardiovascular: Regular rate and Rhythm. GI:  Soft, Non distended, Non tender. + Bowel sounds. Extremities:  No edema. Right knee. Dressing clean, no swelling or no bleeding. Neurologic: Alert and oriented X 1. Moves all ext. Medications:  
Current Facility-Administered Medications Medication Dose Route Frequency  docusate sodium (COLACE) capsule 100 mg  100 mg Oral BID  
  acetaminophen (TYLENOL) suppository 650 mg  650 mg Rectal Q4H PRN  
 diphenhydrAMINE (BENADRYL) injection 25 mg  25 mg IntraVENous Q4H PRN  
 ondansetron (ZOFRAN) injection 4 mg  4 mg IntraVENous Q4H PRN  
 bisacodyL (DULCOLAX) suppository 10 mg  10 mg Rectal DAILY PRN  
 losartan (COZAAR) tablet 50 mg  50 mg Oral DAILY  metoprolol tartrate (LOPRESSOR) tablet 50 mg  50 mg Oral DAILY  morphine injection 2 mg  2 mg IntraVENous Q4H PRN  
 naloxone (NARCAN) injection 0.4 mg  0.4 mg IntraVENous EVERY 2 MINUTES AS NEEDED Labs:   
Recent Results (from the past 24 hour(s)) CBC WITH AUTOMATED DIFF Collection Time: 02/16/20  6:05 AM  
Result Value Ref Range WBC 5.5 4.6 - 13.2 K/uL  
 RBC 3.47 (L) 4.20 - 5.30 M/uL  
 HGB 10.2 (L) 12.0 - 16.0 g/dL HCT 31.8 (L) 35.0 - 45.0 % MCV 91.6 74.0 - 97.0 FL  
 MCH 29.4 24.0 - 34.0 PG  
 MCHC 32.1 31.0 - 37.0 g/dL  
 RDW 16.6 (H) 11.6 - 14.5 % PLATELET 48 (L) 578 - 420 K/uL MPV 11.8 9.2 - 11.8 FL  
 NEUTROPHILS 71 40 - 73 % LYMPHOCYTES 15 (L) 21 - 52 % MONOCYTES 10 3 - 10 % EOSINOPHILS 4 0 - 5 % BASOPHILS 0 0 - 2 %  
 ABS. NEUTROPHILS 3.9 1.8 - 8.0 K/UL  
 ABS. LYMPHOCYTES 0.8 (L) 0.9 - 3.6 K/UL  
 ABS. MONOCYTES 0.6 0.05 - 1.2 K/UL  
 ABS. EOSINOPHILS 0.2 0.0 - 0.4 K/UL  
 ABS. BASOPHILS 0.0 0.0 - 0.1 K/UL  
 DF AUTOMATED PLATELET COMMENTS DECREASED PLATELETS    
 RBC COMMENTS ANISOCYTOSIS 1+ 
    
 RBC COMMENTS POLYCHROMASIA 1+ 
    
 RBC COMMENTS HYPOCHROMIA 1+ Signed By: Eli Singleton MD   
 February 16, 2020 Disclaimer: Sections of this note are dictated using utilizing voice recognition software. Minor typographical errors may be present. If questions arise, please do not hesitate to contact me or call our department.

## 2020-02-16 NOTE — ROUTINE PROCESS
Not void since castaneda d/c'd this am at 0600. Bladder scan show 74cc. Spoke with Shea Hale. Will continue to monitor patients progress.

## 2020-02-16 NOTE — ROUTINE PROCESS
Bedside and Verbal shift change report given to Jaclyn Myers (oncoming nurse) by Oliva Muñoz RN (offgoing nurse). Report included the following information SBAR, Kardex, Intake/Output, MAR and Recent Results.

## 2020-02-16 NOTE — ROUTINE PROCESS
Patient in bed Alert oriented to self, resting quietly no acute distress noted. 0022 Patient restless pulling off, tele monitor and night gown. Benadryl 25 mg iv given.

## 2020-02-16 NOTE — PROGRESS NOTES
Problem: Dysphagia (Adult) Goal: *Acute Goals and Plan of Care (Insert Text) Description Patient will: 1. Tolerate PO trials with 0 s/s overt distress in 4/5 trials 2. Utilize compensatory swallow strategies/maneuvers (decrease bite/sip, size/rate, alt. liq/sol) with min cues in 4/5 trials Rec:    
Puree with thin liquids Aspiration precautions HOB >45 during po intake, remain >30 for 30-45 minutes after po Small bites/sips; alternate liquid/solid with slow feeding rate Oral care TID Meds in puree Assistance with all PO Outcome: Progressing Towards Goal 
 
SPEECH LANGUAGE PATHOLOGY BEDSIDE SWALLOW EVALUATION/TREATMENT Patient: Marilu Mistry (62 y.o. female) Date: 2/16/2020 Primary Diagnosis: Closed right hip fracture ([facility] [address] [S72.001A] Procedure(s) (LRB): 
RIGHT HIP SCREW FIXATION/ CLOSED REDUCTION (Right) 2 Days Post-Op Precautions: aspiration  Fall, WBAT, Skin, Other (comment)(cardiac) PLOF: As per H&P 
 
ASSESSMENT : 
Based on the objective data described below, the patient presents with mod oral dysphagia. Pt edentulous with weak orolingual strength, ROM, and coordination. She tolerated all PO without any overt s/sx of aspiration. Poor bolus acceptance/mastication of mech soft trial with oral expulsion of whole bolus presentation. Pt with positive oral clearance of thin and puree consistencies. Laryngeal elevation was functional/timely to palpation across all trials. Per d/w CNA: pt only tolerated puree consistencies at breakfast. Rec puree with thin liquids, aspiration precautions, oral care TID, and meds crushed. She will require assistance with PO. ST to follow x 1-2 more visits as she appears to be tolerating LRD without difficulty. Patient will benefit from skilled intervention to address the above impairments. Patient's rehabilitation potential is considered to be Fair Factors which may influence rehabilitation potential include:  
[x]            Mental ability/status [x]            Medical condition [x]            Safety awareness PLAN : 
Recommendations and Planned Interventions: See above Frequency/Duration: Patient will be followed by speech-language pathology x 1-2 more visits to address goals. Discharge Recommendations: Roman Nguyen and To Be Determined SUBJECTIVE:  
Patient stated Well, I just would like to put my feet up and rest here. OBJECTIVE:  
 
Past Medical History:  
Diagnosis Date Anemia 1/11/2010  
 thrombocytopenia Anxiety 1/11/2010 Atrial fibrillation ([address] 1/11/2010 Atrial flutter ([address] recurrent CAD (coronary artery disease) 8/3/2010 Cataract 1/11/2010 Coronary atherosclerosis of native coronary artery CVA (cerebral infarction) Dementia ([address] 2/14/2020 Depression 3/18/2014 Diverticulitis 11/19/2014 DVT (deep venous thrombosis) ([address] 1/1/2007 Dysarthria Essential hypertension, benign GERD (gastroesophageal reflux disease) 1/11/2010 History of blood transfusion 2/16/2016 History of cardiac pacemaker 2/14/2020 HLD (hyperlipidemia) 1/11/2010 HTN (hypertension) 1/11/2010 Intermediate coronary syndrome ([address] Nonspecific abnormal electrocardiogram (ECG) (EKG) Osteoporosis 1/4/2016 Pre-operative cardiovascular examination PSVT (paroxysmal supraventricular tachycardia) ([address] 1/11/2010 Pulmonary embolism ([address] 1/1/2007 Right knee pain 1/11/2010 Stroke [facility]) Syncope 5/13/2009 Thrombocytosis ([address] 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  
Procedure Laterality Date CARDIAC SURG PROCEDURE UNLIST    
 ablation HX CATARACT REMOVAL    
 bilateral  
 HX CHOLECYSTECTOMY HX OTHER SURGICAL    
 IVC filter HX PACEMAKER    
 IR IVC FILTER PLACEMENT PERCUTANEOUS  2007 Diet prior to admission: unknown Current Diet:  puree with thin  
 
Cognitive and Communication Status: Neurologic State: Alert Orientation Level: Oriented to person, Disoriented to time, Disoriented to place, Disoriented to situation Cognition: Decreased command following Oral Assessment: 
Oral Assessment Labial: No impairment Dentition: Edentulous Oral Hygiene: adequate Lingual: Decreased rate;Decreased strength Velum: No impairment Mandible: No impairment P.O. Trials: 
Patient Position: 35 at [facility] Vocal quality prior to P.O.: No impairment Consistency Presented: Thin liquid;Puree;Mechanical soft How Presented: SLP-fed/presented;Cup/sip;Spoon;Straw Bolus Acceptance: Impaired Bolus Formation/Control: Impaired Type of Impairment: Anterior;Delayed;Poor;Posterior;Mastication Propulsion: Delayed (# of seconds); Discoordination Oral Residue: Greater than 50% of bolus; Lingual 
Initiation of Swallow: Delayed (# of seconds) Laryngeal Elevation: Weak;Decreased Aspiration Signs/Symptoms: None Pharyngeal Phase Characteristics: Poor endurance Effective Modifications: Small sips and bites; Alternate liquids/solids Cues for Modifications: Moderate Oral Phase Severity: Moderate Pharyngeal Phase Severity : No impairment PAIN: 
Start of Eval: 0 End of Eval: 0 After treatment:  
[]            Patient left in no apparent distress sitting up in chair 
[x]            Patient left in no apparent distress in bed 
[x]            Call bell left within reach [x]            Nursing notified []            Family present 
[]            Caregiver present 
[]            Bed alarm activated COMMUNICATION/EDUCATION:  
[x]            Aspiration precautions; swallow safety; compensatory techniques. [x]            Patient/family have participated as able in goal setting and plan of care. Thank you for this referral. 
 
Melissa Broderick M.S. CCC-SLP/L Speech-Language Pathologist

## 2020-02-16 NOTE — PROGRESS NOTES
Problem: Falls - Risk of 
Goal: *Absence of Falls Description Document Edelmira Minicassi Fall Risk and appropriate interventions in the flowsheet. Outcome: Progressing Towards Goal 
Note: Fall Risk Interventions: 
  
 
Mentation Interventions: Door open when patient unattended Medication Interventions: Patient to call before getting OOB Elimination Interventions: Call light in reach History of Falls Interventions: Door open when patient unattended, Room close to nurse's station Problem: Pressure Injury - Risk of 
Goal: *Prevention of pressure injury Description Document Sam Scale and appropriate interventions in the flowsheet. Outcome: Progressing Towards Goal 
Note: Pressure Injury Interventions: 
Sensory Interventions: Turn and reposition approx. every two hours (pillows and wedges if needed), Assess need for specialty bed Moisture Interventions: Absorbent underpads Activity Interventions: Pressure redistribution bed/mattress(bed type) Mobility Interventions: Pressure redistribution bed/mattress (bed type), Turn and reposition approx. every two hours(pillow and wedges) Nutrition Interventions: Document food/fluid/supplement intake Friction and Shear Interventions: Lift sheet Problem: Pain Goal: *Control of Pain Outcome: Progressing Towards Goal 
  
Problem: Pain Goal: *Control of Pain Outcome: Progressing Towards Goal

## 2020-02-17 NOTE — ROUTINE PROCESS
2230 Patient incontinent of urine, eliezer care given, bladder kucuvqm=960 . Dr Kaci Alberts notified no new order. 0 Notified Dr Angus Bey about DG=965/96, new order for hydralazine iv.

## 2020-02-17 NOTE — PROGRESS NOTES
X2 PT tx attempted at 1106 (in direct nursing care) and at 1450 (completing process of floor and room change). Will follow up at later time/date if appropriate. Austin Pro, LPTA

## 2020-02-17 NOTE — ROUTINE PROCESS
Pt pulled IV out. Iv restarted in the right arm #24. Pt moved to [facility]. Family notified. 1600-Benadryl 12.5  Po given for itching To be dictated.

## 2020-02-17 NOTE — PROGRESS NOTES
Progress Note Patient: Alina Shaffer MRN: [id number]  SSN: [id number] YOB: 1927  Age: 80 y.o. Sex: female Admit Date: 2020 
 
3 Days Post-Op Procedure:  Procedure(s): RIGHT HIP SCREW FIXATION Subjective:  
 
Patient has no new complaints. Objective:  
 
Visit Vitals /77 Pulse 72 Temp 97.5 °F (36.4 °C) Resp 16 Ht 5' 1\" (1.549 m) Wt 120 lb (54.4 kg) SpO2 95% BMI 22.67 kg/m² Temp (24hrs), Av.7 °F (36.5 °C), Min:97 °F (36.1 °C), Max:98.5 °F (36.9 °C) Physical Exam:   
GENERAL: alert, cooperative, no distress, appears stated age, EXTREMITIES:  RLE: Dressing clean and dry. Sensation intact distally. Cap refill brisk. Data Review: images and reports reviewed Lab Review: All lab results for the last 24 hours reviewed. Recent Results (from the past 24 hour(s)) CBC WITH AUTOMATED DIFF Collection Time: 20  3:42 AM  
Result Value Ref Range WBC 5.5 4.6 - 13.2 K/uL  
 RBC 3.49 (L) 4.20 - 5.30 M/uL  
 HGB 10.3 (L) 12.0 - 16.0 g/dL HCT 32.1 (L) 35.0 - 45.0 % MCV 92.0 74.0 - 97.0 FL  
 MCH 29.5 24.0 - 34.0 PG  
 MCHC 32.1 31.0 - 37.0 g/dL  
 RDW 16.8 (H) 11.6 - 14.5 % PLATELET 59 (L) 811 - 420 K/uL MPV 11.3 9.2 - 11.8 FL  
 NEUTROPHILS 71 40 - 73 % LYMPHOCYTES 14 (L) 21 - 52 % MONOCYTES 12 (H) 3 - 10 % EOSINOPHILS 2 0 - 5 % BASOPHILS 1 0 - 2 %  
 ABS. NEUTROPHILS 3.9 1.8 - 8.0 K/UL  
 ABS. LYMPHOCYTES 0.8 (L) 0.9 - 3.6 K/UL  
 ABS. MONOCYTES 0.7 0.05 - 1.2 K/UL  
 ABS. EOSINOPHILS 0.1 0.0 - 0.4 K/UL  
 ABS. BASOPHILS 0.0 0.0 - 0.1 K/UL  
 DF AUTOMATED Assessment:  
 
Hospital Problems  Date Reviewed: 2020 Codes Class Noted POA * (Principal) Closed right hip fracture ([email or web address] ICD-10-CM: S72.001A ICD-9-CM: 820.8  2020 Yes History of cardiac pacemaker (Chronic) ICD-10-CM: Z95.0 ICD-9-CM: V12.50, V45.01  2020 Yes Dementia ([email or web address] (Chronic) ICD-10-CM: F03.90 ICD-9-CM: 294.20  2/14/2020 Yes Monoclonal gammopathy of undetermined significance (Chronic) ICD-10-CM: F24.1 ICD-9-CM: 273.1  6/5/2018 Yes Osteoporosis (Chronic) ICD-10-CM: M81.0 ICD-9-CM: 733.00  1/4/2016 Yes Thrombocytopenia (HCC) (Chronic) ICD-10-CM: D69.6 ICD-9-CM: 287.5  4/18/2015 Yes HTN (hypertension) (Chronic) ICD-10-CM: I10 
ICD-9-CM: 401.9  1/11/2010 Yes GERD (gastroesophageal reflux disease) (Chronic) ICD-10-CM: K21.9 ICD-9-CM: 530.81  1/11/2010 Yes Overview Signed 1/11/2010  8:53 AM by MD Dr. Dot Beckford Plan/Recommendations/Medical Decision Making:  
 
Continue present treatment Ortho Stable for DC, Plan per medicine DVT Px for 4 weeks DC Dressing F/U 2 weeks postop

## 2020-02-17 NOTE — PROGRESS NOTES
Discharge planning Writer spoke with Rosaura Andrew, daughter concerning placement. Ms.F. Genny Mann stated \" I would like for my mother to for rehab then long term. I don't know if I can continue to take care of her. \" The Plan for Transition of Care is related to the following treatment goals: SNF to LTC The  patient representative, Rosaura Andrew, daughter was provided with a choice of provider and agrees  
with the discharge plan. [x] Yes [] No 
 
Freedom of choice list was provided with basic dialogue that supports the patient's individualized plan of care/goals, treatment preferences and shares the quality data associated with the providers. [x] Yes [] No 
Freedom of choice obtained via verbal telephone consent for [address] [place], [place] [address]Nd [facility], [facility] [address], and [facility] and referrals sent via [facility] [place]. RAFAELA PazN, RN Pager # [phone] Care Manager

## 2020-02-17 NOTE — PROGRESS NOTES
Problem: Falls - Risk of 
Goal: *Absence of Falls Description Document Virgie Priyanka Fall Risk and appropriate interventions in the flowsheet. Outcome: Progressing Towards Goal 
Note: Fall Risk Interventions: 
  
 
Mentation Interventions: Evaluate medications/consider consulting pharmacy Medication Interventions: Patient to call before getting OOB Elimination Interventions: Call light in reach History of Falls Interventions: Door open when patient unattended, Room close to nurse's station Problem: Patient Education: Go to Patient Education Activity Goal: Patient/Family Education Outcome: Progressing Towards Goal 
  
Problem: Patient Education: Go to Patient Education Activity Goal: Patient/Family Education Outcome: Progressing Towards Goal 
  
Problem: Pain Goal: *Control of Pain Outcome: Progressing Towards Goal 
  
Problem: Patient Education: Go to Patient Education Activity Goal: Patient/Family Education Outcome: Progressing Towards Goal 
  
Problem: Patient Education: Go to Patient Education Activity Goal: Patient/Family Education Outcome: Progressing Towards Goal

## 2020-02-17 NOTE — PROGRESS NOTES
NUTRITION Nursing Referral: [facility] 
  
RECOMMENDATIONS / PLAN:  
 
- Continue current diet and assist with meals. 
- Continue RD inpatient monitoring and evaluation. NUTRITION INTERVENTIONS & DIAGNOSIS:  
 
- Meals/snacks: modified composition Nutrition Diagnosis: No nutrition diagnosis at this time. ASSESSMENT:  
 
Admitted with fracture after fall at home; s/p right hip fixation/closed reduction. On pureed diet after SLP evaluation. Noted discharge plan for SNF pending authorization. Nutritional intake adequate to meet patients estimated nutritional needs:  Yes Diet: DIET DYSPHAGIA PUREED (NDD1) Food Allergies: NKFA Current Appetite: Unknown Appetite/meal intake prior to admission: Unknown Feeding Limitations:  [x] Swallowing difficulty: SLP following    [] Chewing difficulty    [x] Other: requires assistance with meals Current Meal Intake: Patient Vitals for the past 100 hrs: 
 % Diet Eaten 02/17/20 0856 40 % 02/16/20 1741 40 % 02/16/20 0845 75 % 02/15/20 1245 10 % 02/15/20 0833 75 % BM: unknown Skin Integrity: incision to right hip Edema:   [x] No     [] Yes Pertinent Medications: Reviewed: bowel regimen, ondansetron Recent Labs  
  02/15/20 
0554   
K 3.9  CO2 29  
GLU 97 BUN 17 CREA 0.96  
CA 7.9* Intake/Output Summary (Last 24 hours) at 2/17/2020 1054 Last data filed at 2/17/2020 7978 Gross per 24 hour Intake 280 ml Output  Net 280 ml Anthropometrics: 
Ht Readings from Last 1 Encounters:  
02/13/20 5' 1\" (1.549 m) Last 3 Recorded Weights in this Encounter 02/13/20 8795 Weight: 54.4 kg (120 lb) Body mass index is 22.67 kg/m². Weight History: -13% x 1 year per chart review Weight Metrics 2/13/2020 9/10/2019 2/21/2019 2/9/2019 6/5/2018 5/11/2018 4/30/2018 Weight 120 lb 127 lb 3.2 oz 138 lb 140 lb 145 lb 145 lb 147 lb BMI 22.67 kg/m2 24.03 kg/m2 26.07 kg/m2 26.45 kg/m2 28.32 kg/m2 28.32 kg/m2 28.71 kg/m2 Admitting Diagnosis: Closed right hip fracture ([address] Heron Jang Pertinent PMHx: CAD, CVA, diverticulitis, HTN, GERD, osteoporosis, stroke Education Needs:        [x] None identified  [] Identified - Not appropriate at this time  []  Identified and addressed - refer to education log Learning Limitations:   [] None identified  [x] Identified: dementia Cultural, Advent & ethnic food preferences:  [x] None identified    [] Identified and addressed ESTIMATED NUTRITION NEEDS:  
 
Calories: 9300-6303 kcal (MSJx1.2-1.3) based on  [x] Actual BW 54 kg     [] IBW Protein: 43-65 gm (0.8-1.2 gm/kg) based on  [x] Actual BW      [] IBW Fluid: 1 mL/kcal 
  
MONITORING & EVALUATION:  
 
Nutrition Goal(s):  
- PO nutrition intake will continue to meet >75% of patients estimated nutritional needs over the next 7 days. Outcome: New/Initial goal  
 
Monitoring:   [x] Food and nutrient intake   [x] Food and nutrient administration  [x] Comparative standards   [x] Nutrition-focused physical findings   [x] Anthropometric Measurements   [x] Treatment/therapy   [x] Biochemical data, medical tests, and procedures Previous Recommendations (for follow-up assessments only):  Not Applicable Discharge Planning: No nutritional discharge needs at this time. Participated in care planning, discharge planning, & interdisciplinary rounds as appropriate. Brian Irwin RD, [facility] Pager: [phone]

## 2020-02-17 NOTE — PROGRESS NOTES
[facility] Hospitalist Group Progress Note Patient: Nina Song Age: 80 y.o. : 1927 MR#: [id number] SSN: [id number] Date: 2020 Subjective 24-hour events:  
 
Patient resting comfortably currently, no significant complaints of pain. Nothing new/acute overnight. Assessment:  
Closed right hip fracture status post ORIF/screw fixation Hypertension Chronic thrombocytopenia status post 2 units platelets prior to surgery Acute postoperative anemia MGUS Dementia History cardiac pacemaker implantation Plan: 
Patient medically stable for discharge from medical perspective. We will continue management of chronic issues as previously ordered. Monitor H/H and platelets. No acute need for transfusion. PT/OT as able tolerated. Disposition currently pending. Patient will need insurance authorization prior to SNF disposition. Have already discussed with care management. Case discussed with:  [x]Patient  []Family  []Nursing  [x]Case Management DVT Prophylaxis:  []Lovenox  []Hep SQ  [x]SCDs  []Coumadin   []On Heparin gtt Objective:  
VS:  
Visit Vitals /77 Pulse 72 Temp 97.5 °F (36.4 °C) Resp 16 Ht 5' 1\" (1.549 m) Wt 54.4 kg (120 lb) SpO2 95% BMI 22.67 kg/m² Tmax/24hrs: Temp (24hrs), Av.8 °F (36.6 °C), Min:97.3 °F (36.3 °C), Max:98.5 °F (36.9 °C) Intake/Output Summary (Last 24 hours) at 2020 7747 Last data filed at 2020 5331 Gross per 24 hour Intake 280 ml Output  Net 280 ml General: In NAD. Cardiovascular: RRR. Pulmonary:   Lungs clear, no wheezes. GI: Abdomen soft, nontender/nondistended. Extremities: Warm, no ischemia. Neuro:  Awake and alert, moves extremities spontaneously. Motor grossly nonfocal. 
 
Labs:   
Recent Results (from the past 24 hour(s)) CBC WITH AUTOMATED DIFF Collection Time: 20  3:42 AM  
Result Value Ref Range WBC 5.5 4.6 - 13.2 K/uL RBC 3.49 (L) 4.20 - 5.30 M/uL  
 HGB 10.3 (L) 12.0 - 16.0 g/dL HCT 32.1 (L) 35.0 - 45.0 % MCV 92.0 74.0 - 97.0 FL  
 MCH 29.5 24.0 - 34.0 PG  
 MCHC 32.1 31.0 - 37.0 g/dL  
 RDW 16.8 (H) 11.6 - 14.5 % PLATELET 59 (L) 603 - 420 K/uL MPV 11.3 9.2 - 11.8 FL  
 NEUTROPHILS 71 40 - 73 % LYMPHOCYTES 14 (L) 21 - 52 % MONOCYTES 12 (H) 3 - 10 % EOSINOPHILS 2 0 - 5 % BASOPHILS 1 0 - 2 %  
 ABS. NEUTROPHILS 3.9 1.8 - 8.0 K/UL  
 ABS. LYMPHOCYTES 0.8 (L) 0.9 - 3.6 K/UL  
 ABS. MONOCYTES 0.7 0.05 - 1.2 K/UL  
 ABS. EOSINOPHILS 0.1 0.0 - 0.4 K/UL  
 ABS. BASOPHILS 0.0 0.0 - 0.1 K/UL  
 DF AUTOMATED Signed By: Jazmine Montenegro MD   
 February 17, 2020

## 2020-02-17 NOTE — PROGRESS NOTES
Hematology/Medical Oncology Progress Note Name: Pallavi Antonio : 1927 MRN: [id number] Date: 2020 11:42 AM 
  
[x]I have reviewed the flowsheet and previous days notes. Events overnight reviewed and discussed with nursing staff. Vital signs and records reviewed. Ms. 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 She was last seen by Dr. Jasmyn Gama in his office on 2018, at which time we were concerned that she had a smoldering multiple myeloma versus MGUS. The patient was scheduled to begin gammaglobulin therapy as a primary treatment modality while waiting for the results to define will rule out multiple myeloma. In addition, she was scheduled to follow-up with Dr. Jasmyn Gama in 2 to 3 weeks after the initial consult to discuss potential options of management based on the additional lab data and radiographic data. Unfortunately, I did not see in the EMR with the patient had the bone marrow biopsy done. However the SPEP demonstrated no M-spike, with an increase in gammaglobulin at 2.2 and total globulin at 4.2; free kappa light chains 58.6, free lambda light chains 28.6, kappa/lambda ratio 2.0.  immunofixation shows IgG monoclonal protein with kappa light chain specificity. An apparent polyclonal gammopathy: IgG. Kappa and lambda typing appear increased. Beta-2 microglobulin, serum 2.4 
           
ROS: 
Unable to obtain due to patient status. Vital Signs:   
Visit Vitals /89 (BP 1 Location: Right arm, BP Patient Position: At rest) Pulse 70 Temp 97.3 °F (36.3 °C) Resp 16 Ht 5' 1\" (1.549 m) Wt 54.4 kg (120 lb) SpO2 99% BMI 22.67 kg/m² O2 Device: Room air O2 Flow Rate (L/min): 3 l/min Temp (24hrs), Av.7 °F (36.5 °C), Min:97.3 °F (36.3 °C), Max:98.5 °F (36.9 °C) Intake/Output: Last shift:      02/17 0701 - 02/17 1900 In: 120 [P.O.:120] Out: - Last 3 shifts: 02/15 1901 - 02/17 0700 In: 480 [P.O.:480] Out: - Intake/Output Summary (Last 24 hours) at 2/17/2020 1142 Last data filed at 2/17/2020 1239 Gross per 24 hour Intake 280 ml Output  Net 280 ml Physical Exam: 
General: Resting in bed, NAD HEENT:  Anicteric sclerae; pink palpebral conjunctivae; mucosa moist 
Resp:  Symmetrical chest expansion, no accessory muscle use; good airway entry; no rales/ wheezing/ rhonchi noted CV:  S1, S2 present; regular rate and rhythm GI:  Abdomen soft,moans when touched, unable to truly assess for tenderness; (+) active bowel sounds Extremities:  +2 pulses on all extremities; no edema. Skin:  Warm Neurologic:   Awake and alert, motor grossly nonfocal. 
 
DATA:  
Current Facility-Administered Medications Medication Dose Route Frequency  docusate sodium (COLACE) capsule 100 mg  100 mg Oral BID  acetaminophen (TYLENOL) suppository 650 mg  650 mg Rectal Q4H PRN  
 diphenhydrAMINE (BENADRYL) injection 25 mg  25 mg IntraVENous Q4H PRN  
 ondansetron (ZOFRAN) injection 4 mg  4 mg IntraVENous Q4H PRN  
 bisacodyL (DULCOLAX) suppository 10 mg  10 mg Rectal DAILY PRN  
 losartan (COZAAR) tablet 50 mg  50 mg Oral DAILY  metoprolol tartrate (LOPRESSOR) tablet 50 mg  50 mg Oral DAILY  morphine injection 2 mg  2 mg IntraVENous Q4H PRN  
 naloxone (NARCAN) injection 0.4 mg  0.4 mg IntraVENous EVERY 2 MINUTES AS NEEDED Labs: 
Recent Labs  
  02/17/20 
0342 02/16/20 
9724 02/15/20 
5156 WBC 5.5 5.5 5.7 HGB 10.3* 10.2* 10.0* HCT 32.1* 31.8* 30.6* PLT 59* 48* 64* Recent Labs  
  02/15/20 
0554   
K 3.9  CO2 29  
GLU 97 BUN 17 CREA 0.96  
CA 7.9* No results for input(s): PH, PCO2, PO2, HCO3, FIO2 in the last 72 hours. IMPRESSION:  
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 
  
  
  
PLAN:  
· H/H 10.3/32.1, platelet count 29,100 no intervention warranted at this time. We will offer Retacrit if hemoglobin goes below 10g/dl and hematocrit goes below 30% · I have consulted IR for Bone Marrow Bx for tomorrow in efffort r/o multiple myeloma versus MGUS. ·  
  
 
The patient is: [x] acutely ill Risk of deterioration: [] moderate  
 [] critically ill  [] high My assessment/plan was discussed with: 
[x]nursing []PT/OT   
[]respiratory therapy [x]Dr.Lloyd Noel Harris MD  
  
[]family [] Suraj Herrera NP

## 2020-02-17 NOTE — PROGRESS NOTES
Report given to [facility] verbally and bediside. End of Shift Note Bedside and verbal shift change report given to  [facility] (On coming nurse) by  Adrienne Stoner (Off going nurse). Report included the following information:  
   --Procedure Summary 
   --MAR, 
   --Recent Results --Med Rec Status SBAR Recommendations:  
Issues for Provider to address Activity This Shift [x] Bed Rest Order 
 [] Refused 
 [] Dangled  
 [] TDWB Ambulating: 
   [] Bathroom [] BSC [] Room/Hallway Up in Chair for meals []Yes [] No  
Voiding       [] Yes  [] No 
[place]          [] Yes  [] No 
Incontinent [x] Yes  [] No 
 
DUE TO VOID POUR        [] Yes [] No 
Purewick    [] Yes [] No 
New Onset [] Yes [] No Straight Cath   []Yes  [] No 
Condom Cath  [] Yes [] No 
MD Called      [] Yes  [] No  
Blood Sugars Managed []Yes [x] No   
Bowels Moved [] Yes [x] No 
 
Incontinent     [x] Yes [] No Passed Gas []Yes [] No 
 
New Onset  []Yes [] No 
  
 
 MD Called []Yes  [] No 
  
CHG Bath Done Before Surgery After Surgery  
  
[x] Yes  [] No 
[x] Yes  [] No   
  
Drain Removed [] Yes  [] No [x] N/A Dressing Changed [] Yes   [] No [] N/A removed Nausea/Vomiting [] Yes   [x] No    
Ice Packs Changed [] Yes   [] No  [x] N/A Incentive Spirometer  [x] Yes  [] No     
SCD Pumps On Ankle Pumping  [x] Yes   [] No  
  
[] Yes   [x] No    
  
Telemetry Monitoring [x] Yes   [] No   Rhythm paced

## 2020-02-17 NOTE — ROUTINE PROCESS
Pt resting quietly. No acute distress noted. IV restarted in the left arm #24. Pt tolerated the procedure well.

## 2020-02-17 NOTE — PROGRESS NOTES
Discharge planning [facility] accepted patient and Gloria Raza is agreement of this facility. Spoke with Kari with [facility] concerning accepting patient for SNF to LTC. LTSS/screening was completed by [facility] Department 1/20/2019 tracking # [id number]. Writer started [facility] authorization for [facility] and clinicals with PT/OT notes faxed to [phone]. 
 
RAFAELA BryantN, RN Pager # [phone] Care Manager

## 2020-02-18 NOTE — ROUTINE PROCESS
Received report from [facility]. Pt AAOx1-self only, NAD, breathing non labored, on room air, HOB up. IV site clean, dry and intact-wrapped in mara. Bed at the lowest level on lock position, call bell w/i reach. Bed alarm on. Bedside and Verbal shift change report given to [address] Juan (oncoming nurse) by me (offgoing nurse). Report included the following information SBAR, Kardex, Procedure Summary, Intake/Output, MAR, Recent Results and Cardiac Rhythm Paced.

## 2020-02-18 NOTE — PROGRESS NOTES
PHYSICAL THERAPY TREATMENT Patient: Alan Hernandez (59 y.o. female) Date: 2/18/2020 Diagnosis: Closed right hip fracture ([address] [place] Closed right hip fracture ([address] Procedure(s) (LRB): 
RIGHT HIP SCREW FIXATION/ CLOSED REDUCTION (Right) 4 Days Post-Op Precautions: Fall, WBAT, Skin, Other (comment)(cardiac) Chart, physical therapy assessment, plan of care and goals were reviewed. OBJECTIVE/ ASSESSMENT: 
Patient found supine in bed. Pt is confused and only able to perform limited therex. Pt performed ankle pumps x 2 bilaterally and was able to straighten her knees when asked to. Pt removed the padding that was placed around her IV and appears restless. Nurse Michael Valencia notified and pt left with needs in reach. Progression toward goals: 
[]      Improving appropriately and progressing toward goals [x]      Improving slowly and progressing toward goals 
[]      Not making progress toward goals and plan of care will be adjusted PLAN: 
Patient continues to benefit from skilled intervention to address the above impairments. Continue treatment per established plan of care. Discharge Recommendations:  Roman Nguyen / Ronak Mota Further Equipment Recommendations for Discharge:  rolling walker SUBJECTIVE:  
Patient stated Not today.  OBJECTIVE DATA SUMMARY:  
Critical Behavior: 
Neurologic State: Appropriate for age, Alert, Eyes open to stimulus Orientation Level: Oriented to person Cognition: Follows commands Pain: 
Pain level pre-treatment: Not rated Pain level post-treatment: Not rated Activity Tolerance:  
Fair Please refer to the flowsheet for vital signs taken during this treatment. After treatment:  
[] Patient left in no apparent distress sitting up in chair 
[x] Patient left in no apparent distress in bed 
[x] Call bell left within reach [x] Nursing notified 
[] Daughter present 
[] Bed alarm activated 
[] SCDs applied COMMUNICATION/EDUCATION:  
 [x]         Role of Physical Therapy 
[]         Fall prevention 
[]         Bed mobility []         Transfers 
[]         Ambulation / gait []         Assistive device management 
[]         Stairs 
[]         Body mechanics []         Position change  
[x]         Therapeutic exercise 
[]         Activity pacing / energy conservation 
[]         Other: 
   
Jennifer Mcleod PTA Time Calculation: 9 mins

## 2020-02-18 NOTE — PROGRESS NOTES
[facility] Hospitalist Group Progress Note Patient: Maddie Maria Age: 80 y.o. : 1927 MR#: [id number] SSN: [id number] Date: 2020 Subjective 24-hour events:  
 
Blood pressures continue to run high overall but no new issues overnight. No chest pain, shortness of breath/dizziness/lightheadedness. Daughter present at bedside. Assessment:  
Closed right hip fracture status post ORIF/screw fixation Hypertension Chronic thrombocytopenia status post 2 units platelets prior to surgery Acute postoperative anemia MGUS Dementia History cardiac pacemaker implantation Plan: 
Increase losartan dose and monitor blood pressures - further adjustments as necessary. Will also continue PRN hydralazine with parameters as ordered. H&H stable but platelets remain low. No acute bleeding issues. We will continue to monitor. No need for blood or platelet transfusion acutely. PT/OT as tolerated. Insurance authorization for skilled nursing placement has been obtained. We will aim for disposition tomorrow once blood pressures are better controlled. Best supportive care as ordered in interim. Follow. Case discussed with:  [x]Patient  [x]Family  [x]Nursing  [x]Case Management DVT Prophylaxis:  []Lovenox  []Hep SQ  [x]SCDs  []Coumadin   []On Heparin gtt Objective:  
VS:  
Visit Vitals BP (!) 197/98 (BP 1 Location: Right arm, BP Patient Position: At rest) Pulse 76 Temp 97.7 °F (36.5 °C) Resp 18 Ht 5' 1\" (1.549 m) Wt 54.4 kg (120 lb) SpO2 99% BMI 22.67 kg/m² Tmax/24hrs: Temp (24hrs), Av.7 °F (36.5 °C), Min:97.1 °F (36.2 °C), Max:98.3 °F (36.8 °C) No intake or output data in the 24 hours ending 20 0943 General: In NAD. Cardiovascular: RRR. Pulmonary:   Lungs clear, no wheezes. GI: Abdomen soft, nontender/nondistended. Extremities: Warm, no ischemia. Neuro:  Awake and alert, moves extremities spontaneously.   Motor grossly nonfocal. 
 
Labs:   
Recent Results (from the past 24 hour(s)) CBC WITH AUTOMATED DIFF Collection Time: 02/18/20  2:17 AM  
Result Value Ref Range WBC 5.6 4.6 - 13.2 K/uL  
 RBC 3.63 (L) 4.20 - 5.30 M/uL  
 HGB 10.8 (L) 12.0 - 16.0 g/dL HCT 33.6 (L) 35.0 - 45.0 % MCV 92.6 74.0 - 97.0 FL  
 MCH 29.8 24.0 - 34.0 PG  
 MCHC 32.1 31.0 - 37.0 g/dL  
 RDW 16.9 (H) 11.6 - 14.5 % PLATELET 48 (L) 186 - 420 K/uL NEUTROPHILS 77 (H) 40 - 73 % LYMPHOCYTES 14 (L) 21 - 52 % MONOCYTES 7 3 - 10 % EOSINOPHILS 1 0 - 5 % BASOPHILS 1 0 - 2 %  
 ABS. NEUTROPHILS 4.2 1.8 - 8.0 K/UL  
 ABS. LYMPHOCYTES 0.8 (L) 0.9 - 3.6 K/UL  
 ABS. MONOCYTES 0.4 0.05 - 1.2 K/UL  
 ABS. EOSINOPHILS 0.1 0.0 - 0.4 K/UL  
 ABS. BASOPHILS 0.1 0.0 - 0.1 K/UL  
 DF AUTOMATED PLATELET COMMENTS DECREASED PLATELETS    
 RBC COMMENTS ANISOCYTOSIS 2+ 
    
 RBC COMMENTS POLYCHROMASIA 1+ 
    
 RBC COMMENTS HYPOCHROMIA 1+ 
    
 RBC COMMENTS POIKILOCYTOSIS 1+ Signed By: Gentry Jones MD   
 February 18, 2020

## 2020-02-18 NOTE — PROGRESS NOTES
Speech Therapy Note: Follow up attempted. Could not progress with ST intervention because patient:   
 
[x]  Lethargic, unable to be alerted enough for safe PO intake 
[]  Refused participation []  Off the unit []  NPO for procedure 
[]  Other:  
 
Jesusita Borrero M.S., CCC-SLP Speech Language Pathologist

## 2020-02-18 NOTE — PROGRESS NOTES
Hematology/Medical Oncology Progress Note Name: Cassia Enamorado : 1927 MRN: [id number] Date: 2020 11:42 AM 
  
[x]I have reviewed the flowsheet and previous days notes. Events overnight reviewed and discussed with nursing staff. Vital signs and records reviewed. Ms. 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 She was last seen by Dr. Ryan Valente in his office on 2018, at which time we were concerned that she had a smoldering multiple myeloma versus MGUS. The patient was scheduled to begin gammaglobulin therapy as a primary treatment modality while waiting for the results to define will rule out multiple myeloma. In addition, she was scheduled to follow-up with Dr. Ryan Valente in 2 to 3 weeks after the initial consult to discuss potential options of management based on the additional lab data and radiographic data. Unfortunately, I did not see in the EMR with the patient had the bone marrow biopsy done. However the SPEP demonstrated no M-spike, with an increase in gammaglobulin at 2.2 and total globulin at 4.2; free kappa light chains 58.6, free lambda light chains 28.6, kappa/lambda ratio 2.0.  immunofixation shows IgG monoclonal protein with kappa light chain specificity. An apparent polyclonal gammopathy: IgG. Kappa and lambda typing appear increased. Beta-2 microglobulin, serum 2.4 
           
ROS: 
Unable to obtain due to patient status. Vital Signs:   
Visit Vitals BP (!) 198/99 Pulse 74 Temp 97.7 °F (36.5 °C) Resp 18 Ht 5' 1\" (1.549 m) Wt 54.4 kg (120 lb) SpO2 96% BMI 22.67 kg/m² O2 Device: Room air O2 Flow Rate (L/min): 3 l/min Temp (24hrs), Av.6 °F (36.4 °C), Min:97.1 °F (36.2 °C), Max:98.3 °F (36.8 °C) Intake/Output:  
Last shift:      No intake/output data recorded. Last 3 shifts: 02/16 1901 - 02/18 0700 In: 180 [P.O.:180] Out: - Intake/Output Summary (Last 24 hours) at 2/18/2020 5384 Last data filed at 2/17/2020 0102 Gross per 24 hour Intake 120 ml Output  Net 120 ml Physical Exam: 
General: Resting in bed, NAD HEENT:  Anicteric sclerae; pink palpebral conjunctivae; mucosa moist 
Resp:  Symmetrical chest expansion, no accessory muscle use; good airway entry; no rales/ wheezing/ rhonchi noted CV:  S1, S2 present; regular rate and rhythm GI:  Abdomen soft, (+) active bowel sounds Extremities:  +2 pulses on all extremities; no edema. Skin:  Warm Neurologic:   Awake and alert, motor grossly nonfocal. 
 
DATA:  
Current Facility-Administered Medications Medication Dose Route Frequency  influenza vaccine 2019-20 (6 mos+)(PF) (FLUARIX/FLULAVAL/FLUZONE QUAD) injection 0.5 mL  0.5 mL IntraMUSCular PRIOR TO DISCHARGE  diphenhydrAMINE (BENADRYL) 12.5 mg/5 mL oral elixir 12.5-25 mg  12.5-25 mg Oral Q6H PRN  
 hydrALAZINE (APRESOLINE) 20 mg/mL injection 10 mg  10 mg IntraVENous Q6H PRN  
 docusate sodium (COLACE) capsule 100 mg  100 mg Oral BID  acetaminophen (TYLENOL) suppository 650 mg  650 mg Rectal Q4H PRN  
 ondansetron (ZOFRAN) injection 4 mg  4 mg IntraVENous Q4H PRN  
 bisacodyL (DULCOLAX) suppository 10 mg  10 mg Rectal DAILY PRN  
 losartan (COZAAR) tablet 50 mg  50 mg Oral DAILY  metoprolol tartrate (LOPRESSOR) tablet 50 mg  50 mg Oral DAILY  morphine injection 2 mg  2 mg IntraVENous Q4H PRN  
 naloxone (NARCAN) injection 0.4 mg  0.4 mg IntraVENous EVERY 2 MINUTES AS NEEDED Labs: 
Recent Labs  
  02/18/20 
0217 02/17/20 
0342 02/16/20 
0753 WBC 5.6 5.5 5.5 HGB 10.8* 10.3* 10.2* HCT 33.6* 32.1* 31.8* PLT 48* 59* 48* No results for input(s): NA, K, CL, CO2, GLU, BUN, CREA, CA, MG, PHOS, ALB, TBIL, SGOT, ALT, INR, INREXT, INREXT in the last 72 hours. No results for input(s): PH, PCO2, PO2, HCO3, FIO2 in the last 72 hours. IMPRESSION:  
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 
  
  
  
PLAN:  
· H/H 10.8/33.6, platelet count 78,339 no intervention warranted at this time. We will offer Retacrit if hemoglobin goes below 10g/dl and hematocrit goes below 30% while inpatient. · Tentative plan is for Bone Marrow Bx today to r/o multiple myeloma versus MGUS. ·  
  
 
The patient is: [x] acutely ill Risk of deterioration: [] moderate  
 [] critically ill  [] high My assessment/plan was discussed with: 
[x]nursing []PT/OT   
[]respiratory therapy [x]Dr.Lloyd Silvana Nuñez MD  
  
[]family [] Ayush Pittman NP

## 2020-02-18 NOTE — PROGRESS NOTES
Problem: Falls - Risk of 
Goal: *Absence of Falls Description Document Martha Howard Fall Risk and appropriate interventions in the flowsheet. Outcome: Progressing Towards Goal 
Note: Fall Risk Interventions: 
  
 
Mentation Interventions: Adequate sleep, hydration, pain control, Bed/chair exit alarm, Door open when patient unattended, Evaluate medications/consider consulting pharmacy, Familiar objects from home, Increase mobility, More frequent rounding, Reorient patient, Room close to nurse's station, Toileting rounds, Update white board Medication Interventions: Assess postural VS orthostatic hypotension, Bed/chair exit alarm, Evaluate medications/consider consulting pharmacy, Patient to call before getting OOB, Teach patient to arise slowly Elimination Interventions: Bed/chair exit alarm, Call light in reach, Elevated toilet seat, Patient to call for help with toileting needs, Stay With Me (per policy), Toilet paper/wipes in reach, Toileting schedule/hourly rounds, Urinal in reach History of Falls Interventions: Bed/chair exit alarm, Consult care management for discharge planning, Door open when patient unattended, Evaluate medications/consider consulting pharmacy, Investigate reason for fall, Room close to nurse's station, Utilize gait belt for transfer/ambulation, Assess for delayed presentation/identification of injury for 48 hrs (comment for end date), Vital signs minimum Q4HRs X 24 hrs (comment for end date) Problem: Patient Education: Go to Patient Education Activity Goal: Patient/Family Education Outcome: Progressing Towards Goal 
  
Problem: Pressure Injury - Risk of 
Goal: *Prevention of pressure injury Description Document Sam Scale and appropriate interventions in the flowsheet.  
Outcome: Progressing Towards Goal 
Note: Pressure Injury Interventions: 
Sensory Interventions: Assess changes in LOC, Assess need for specialty bed, Avoid rigorous massage over bony prominences, Check visual cues for pain, Discuss PT/OT consult with provider, Float heels, Keep linens dry and wrinkle-free, Maintain/enhance activity level, Minimize linen layers, Monitor skin under medical devices, Pad between skin to skin, Pressure redistribution bed/mattress (bed type), Turn and reposition approx. every two hours (pillows and wedges if needed) Moisture Interventions: Absorbent underpads, Apply protective barrier, creams and emollients, Assess need for specialty bed, Check for incontinence Q2 hours and as needed, Internal/External urinary devices, Limit adult briefs, Maintain skin hydration (lotion/cream), Minimize layers, Moisture barrier, Offer toileting Q_hr Activity Interventions: Assess need for specialty bed, Pressure redistribution bed/mattress(bed type), PT/OT evaluation Mobility Interventions: Assess need for specialty bed, Chair cushion, Float heels, HOB 30 degrees or less, Pressure redistribution bed/mattress (bed type), PT/OT evaluation, Suspension boots, Turn and reposition approx. every two hours(pillow and wedges) Nutrition Interventions: Document food/fluid/supplement intake, Discuss nutritional consult with provider, Offer support with meals,snacks and hydration Friction and Shear Interventions: Apply protective barrier, creams and emollients, Feet elevated on foot rest, Foam dressings/transparent film/skin sealants, HOB 30 degrees or less, Lift sheet, Lift team/patient mobility team, Minimize layers, Sit at 90-degree angle, Transferring/repositioning devices Problem: Patient Education: Go to Patient Education Activity Goal: Patient/Family Education Outcome: Progressing Towards Goal 
  
Problem: Patient Education: Go to Patient Education Activity Goal: Patient/Family Education Outcome: Progressing Towards Goal 
  
Problem: Patient Education: Go to Patient Education Activity Goal: Patient/Family Education Outcome: Progressing Towards Goal 
  
Problem: Pain Goal: *Control of Pain Outcome: Progressing Towards Goal 
  
Problem: Patient Education: Go to Patient Education Activity Goal: Patient/Family Education Outcome: Progressing Towards Goal 
  
Problem: Patient Education: Go to Patient Education Activity Goal: Patient/Family Education Outcome: Progressing Towards Goal 
  
Problem: Patient Education: Go to Patient Education Activity Goal: Patient/Family Education Outcome: Progressing Towards Goal 
  
Problem: Hip Fracture: Post-Op Day 2 Goal: Off Pathway (Use only if patient is Off Pathway) Outcome: Progressing Towards Goal 
Goal: Activity/Safety Outcome: Progressing Towards Goal 
Goal: Diagnostic Test/Procedures Outcome: Progressing Towards Goal 
Goal: Nutrition/Diet Outcome: Progressing Towards Goal 
Goal: Medications Outcome: Progressing Towards Goal 
Goal: Respiratory Outcome: Progressing Towards Goal 
Goal: Treatments/Interventions/Procedures Outcome: Progressing Towards Goal 
Goal: Psychosocial 
Outcome: Progressing Towards Goal 
Goal: Discharge Planning Outcome: Progressing Towards Goal 
Goal: *Optimal pain control at patient's stated goal 
Outcome: Progressing Towards Goal 
Goal: *Hemodynamically stable Outcome: Progressing Towards Goal 
Goal: *Adequate oxygenation Outcome: Progressing Towards Goal 
Goal: *Tolerates increased activity Outcome: Progressing Towards Goal 
Goal: *Tolerates nutrition therapy Outcome: Progressing Towards Goal 
Goal: *Absence of skin breakdown Outcome: Progressing Towards Goal

## 2020-02-18 NOTE — PROGRESS NOTES
conducted an initial consultation and Spiritual Assessment for [facility], who is a 80 y.o.,female. Patient's Primary Language is: [place]. According to the patient's EMR Hinduism Affiliation is: [facility].  
 
The reason the Patient came to the hospital is:  
Patient Active Problem List  
 Diagnosis Date Noted  Closed right hip fracture ([address] 02/14/2020  
 History of cardiac pacemaker 02/14/2020  Dementia ([address] 02/14/2020  Monoclonal gammopathy of undetermined significance 06/05/2018  Advance directive discussed with patient 04/28/2017  Lower GI bleed 02/25/2016  History of blood transfusion 02/16/2016  Acute blood loss anemia 02/03/2016  Diverticulosis 02/03/2016  Acute GI bleeding 02/02/2016  Osteoporosis 01/04/2016  Acute right-sided weakness 04/18/2015  Thrombocytopenia ([address] 04/18/2015  CVA (cerebral infarction) 04/16/2015  S/P ablation operation for arrhythmia 04/10/2015  Diverticulitis 11/19/2014  GI bleed 10/29/2014  Depression 03/18/2014  Atrial flutter ([address]  Coronary atherosclerosis of native coronary artery  Essential hypertension, benign  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  
 HTN (hypertension) 01/11/2010  GERD (gastroesophageal reflux disease) 01/11/2010  Anxiety 01/11/2010  Anemia 01/11/2010  HLD (hyperlipidemia) 01/11/2010  Atrial fibrillation ([address] 01/11/2010  PSVT (paroxysmal supraventricular tachycardia) ([address] 01/11/2010  Cataract 01/11/2010  Right knee pain 01/11/2010  Syncope 05/13/2009  DVT (deep venous thrombosis) ([address] 01/01/2007 The  provided the following Interventions: Initiated a relationship of care and support. Explored issues of nikko, belief, spirituality and Episcopalian/ritual needs while hospitalized. Listened empathically. Provided chaplaincy education. Provided information about Spiritual Care Services. Offered prayer and assurance of continued prayers on patient's behalf. Chart reviewed. The following outcomes where achieved: 
Patient shared limited information about both their medical narrative and spiritual journey/beliefs. Patient processed feeling about current hospitalization. Patient expressed gratitude for 's visit. Assessment: 
Patient does not have any Episcopalian/cultural needs that will affect patient's preferences in health care. There are no spiritual or Episcopalian issues which require intervention at this time. Plan: 
Chaplains will continue to follow and will provide pastoral care on an as needed/requested basis.  recommends bedside caregivers page  on duty if patient shows signs of acute spiritual or emotional distress. [facility] Spiritual Care  
[phone]

## 2020-02-19 NOTE — PROGRESS NOTES
Problem: Mobility Impaired (Adult and Pediatric) Goal: *Acute Goals and Plan of Care (Insert Text) Description Physical Therapy Goals Initiated 2/15/2020 and to be accomplished within 5-7 day(s) 1. Patient will move from supine to sit and sit to supine  in bed with moderate assistance . 2.  Patient will transfer from bed to chair and chair to bed with moderate assistance  using the least restrictive device. 3.  Patient will perform sit to stand with moderate assistance . 4. Patient will ambulate with moderate assistance  for 15 feet with the least restrictive device. PLOF: UA to obtain Outcome: Progressing Towards Goal 
  
PHYSICAL THERAPY TREATMENT Patient: Davion Martin (55 y.o. female) Date: 2/19/2020 Diagnosis: Closed right hip fracture ([address] Heron Jang Closed right hip fracture ([address] Procedure(s) (LRB): 
RIGHT HIP SCREW FIXATION/ CLOSED REDUCTION (Right) 5 Days Post-Op Precautions: Fall, WBAT, Skin, Other (comment)(cardiac) Chart, physical therapy assessment, plan of care and goals were reviewed. ASSESSMENT: 
Pt is found ain a very active, but demented state, pulling at all objects within reach. Pt is very receptive to to commands, but quickly reverts to random reaching. Verbal, tactile and physical cues were use to assist pt to EOB and standing. Gospel singing used to promote continued participation. Pt is not able ot appropriately manipulate RW, but ambulates with Bilateral HHA. Pt will benefit from placement, but dementia may limit outcome. Suggesting an activity vest. RN aware. Progression toward goals: 
[]      Improving appropriately and progressing toward goals [x]      Improving slowly and progressing toward goals 
[]      Not making progress toward goals and plan of care will be adjusted PLAN: 
Patient continues to benefit from skilled intervention to address the above impairments. Continue treatment per established plan of care. Discharge Recommendations:  SNF/ LTCF Further Equipment Recommendations for Discharge:  gait belt SUBJECTIVE:  
Patient stated We're in the [facility] yard.  OBJECTIVE DATA SUMMARY:  
Critical Behavior: 
Neurologic State: Alert, Eyes open spontaneously, Restless Orientation Level: Oriented to person, Disoriented to place, Disoriented to situation, Disoriented to time Cognition: Impaired decision making, Decreased command following, Poor safety awareness Functional Mobility Training: 
Bed Mobility: 
Rolling: Moderate assistance Supine to Sit: Moderate assistance Sit to Supine: Moderate assistance Transfers: 
Sit to Stand: Maximum assistance Stand to Sit: Maximum assistance Balance: 
Sitting: Intact; With support Standing: Impaired; With support Standing - Static: Fair Standing - Dynamic : Poor Ambulation/Gait Training: 
Distance (ft): 16 Feet (ft) Assistive Device: Gait belt(HHA ) Ambulation - Level of Assistance: Maximum assistance; Total assistance Gait Abnormalities: Step to gait Right Side Weight Bearing: As tolerated Base of Support: Narrowed Stance: Time Speed/Julianna: Slow Step Length: Right shortened;Left shortened Therapeutic Exercises: LAQ, Hip adduction and ankle pumps x 10 reps Pain: 
Pain Scale 1: Numeric (0 - 10) Pain Intensity 1: 0 Pain Out: 0 Activity Tolerance:  
Good Please refer to the flowsheet for vital signs taken during this treatment. After treatment:  
[] Patient left in no apparent distress sitting up in chair 
[x] Patient left in no apparent distress in bed 
[x] Call bell left within reach [x] Nursing notified 
[] Caregiver present [x] Bed alarm activated Blossom Morris PTA Time Calculation: 32 mins

## 2020-02-19 NOTE — ROUTINE PROCESS
Received report from [facility]. Pt AAOx1-self only, NAD, breathing non labored, on room air, HOB up. IV site clean, dry and intact. Bed at the lowest level on lock position, call bell w/i reach. Bed alarm on. Bedside and Verbal shift change report given to PACO Correa (oncoming nurse) by me (offgoing nurse). Report included the following information SBAR, Kardex, Procedure Summary, Intake/Output and Recent Results.

## 2020-02-19 NOTE — PROGRESS NOTES
Problem: Falls - Risk of 
Goal: *Absence of Falls Description Document Gianna Starch Fall Risk and appropriate interventions in the flowsheet. Outcome: Progressing Towards Goal 
Note: Fall Risk Interventions: 
Mobility Interventions: Communicate number of staff needed for ambulation/transfer, Patient to call before getting OOB, PT Consult for mobility concerns, Utilize walker, cane, or other assistive device Mentation Interventions: Adequate sleep, hydration, pain control, Door open when patient unattended, Familiar objects from home, More frequent rounding, Room close to nurse's station, Update white board Medication Interventions: Assess postural VS orthostatic hypotension, Patient to call before getting OOB, Teach patient to arise slowly Elimination Interventions: Call light in reach, Toileting schedule/hourly rounds History of Falls Interventions: Door open when patient unattended, Room close to nurse's station, Vital signs minimum Q4HRs X 24 hrs (comment for end date) Problem: Patient Education: Go to Patient Education Activity Goal: Patient/Family Education Outcome: Progressing Towards Goal 
  
Problem: Pressure Injury - Risk of 
Goal: *Prevention of pressure injury Description Document Sam Scale and appropriate interventions in the flowsheet. Outcome: Progressing Towards Goal 
Note: Pressure Injury Interventions: 
Sensory Interventions: Assess need for specialty bed, Keep linens dry and wrinkle-free, Minimize linen layers Moisture Interventions: Absorbent underpads, Minimize layers Activity Interventions: Assess need for specialty bed Mobility Interventions: PT/OT evaluation, Turn and reposition approx. every two hours(pillow and wedges)(turn team q 2 hours) Nutrition Interventions: Document food/fluid/supplement intake, Offer support with meals,snacks and hydration Friction and Shear Interventions: Minimize layers Problem: Patient Education: Go to Patient Education Activity Goal: Patient/Family Education Outcome: Progressing Towards Goal 
  
Problem: Patient Education: Go to Patient Education Activity Goal: Patient/Family Education Outcome: Progressing Towards Goal 
  
Problem: Patient Education: Go to Patient Education Activity Goal: Patient/Family Education Outcome: Progressing Towards Goal 
  
Problem: Pain Goal: *Control of Pain Outcome: Progressing Towards Goal 
  
Problem: Patient Education: Go to Patient Education Activity Goal: Patient/Family Education Outcome: Progressing Towards Goal 
  
Problem: Patient Education: Go to Patient Education Activity Goal: Patient/Family Education Outcome: Progressing Towards Goal 
  
Problem: Patient Education: Go to Patient Education Activity Goal: Patient/Family Education Outcome: Progressing Towards Goal 
  
Problem: Hip Fracture: Post-Op Day 3 Goal: Off Pathway (Use only if patient is Off Pathway) Outcome: Progressing Towards Goal 
Goal: Activity/Safety Outcome: Progressing Towards Goal 
Goal: Diagnostic Test/Procedures Outcome: Progressing Towards Goal 
Goal: Nutrition/Diet Outcome: Progressing Towards Goal 
Goal: Medications Outcome: Progressing Towards Goal 
Goal: Respiratory Outcome: Progressing Towards Goal 
Goal: Treatments/Interventions/Procedures Outcome: Progressing Towards Goal 
Goal: Psychosocial 
Outcome: Progressing Towards Goal 
Goal: Discharge Planning Outcome: Progressing Towards Goal 
Goal: *Met physical therapy criteria for discharge to next level of care Outcome: Progressing Towards Goal 
Goal: *Optimal pain control at patient's stated goal 
Outcome: Progressing Towards Goal 
Goal: *Hemodynamically stable Outcome: Progressing Towards Goal 
Goal: *Tolerating diet Outcome: Progressing Towards Goal 
Goal: *Active bowel function Outcome: Progressing Towards Goal 
Goal: *Adequate urinary output Outcome: Progressing Towards Goal 
 Goal: *Absence of skin breakdown Outcome: Progressing Towards Goal 
Goal: *Patient verbalizes understanding of discharge instructions Outcome: Progressing Towards Goal

## 2020-02-19 NOTE — PROGRESS NOTES
Hematology/Medical Oncology Progress Note Name: Carlos Oliver : 1927 MRN: [id number] Date: 2020 11:42 AM 
  
[x]I have reviewed the flowsheet and previous days notes. Events overnight reviewed and discussed with nursing staff. Vital signs and records reviewed. Ms. 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 She was last seen in the oncology office in his on 2018, at which time we were concerned that she had a smoldering multiple myeloma versus MGUS. The patient was scheduled to begin gammaglobulin therapy as a primary treatment modality while waiting for the results to define will rule out multiple myeloma. In addition, she was scheduled to follow-up in the hematology/oncology clinic in 2 to 3 weeks after the initial consult to discuss potential options of management based on the additional lab data and radiographic data. Unfortunately, I did not see in the EMR with the patient had the bone marrow biopsy done. However the SPEP demonstrated no M-spike, with an increase in gammaglobulin at 2.2 and total globulin at 4.2; free kappa light chains 58.6, free lambda light chains 28.6, kappa/lambda ratio 2.0.  immunofixation shows IgG monoclonal protein with kappa light chain specificity. An apparent polyclonal gammopathy: IgG. Kappa and lambda typing appear increased. Beta-2 microglobulin, serum 2.4 
           
ROS: 
Unable to obtain due to patient status. Vital Signs:   
Visit Vitals /79 (BP 1 Location: Left arm, BP Patient Position: At rest) Pulse (!) 101 Temp 97.2 °F (36.2 °C) Resp 16 Ht 5' 1\" (1.549 m) Wt 54.4 kg (120 lb) SpO2 100% BMI 22.67 kg/m² O2 Device: Room air O2 Flow Rate (L/min): 3 l/min Temp (24hrs), Av.1 °F (36.7 °C), Min:97.2 °F (36.2 °C), Max:98.8 °F (37.1 °C) Intake/Output:  
Last shift:      No intake/output data recorded. Last 3 shifts: No intake/output data recorded. No intake or output data in the 24 hours ending 02/19/20 1354 Physical Exam: 
General: Resting in bed, NAD HEENT:  Anicteric sclerae; pink palpebral conjunctivae; mucosa moist 
Resp:  Symmetrical chest expansion, no accessory muscle use; good airway entry; no rales/ wheezing/ rhonchi noted CV:  S1, S2 present; regular rate and rhythm GI:  Abdomen soft, (+) active bowel sounds Extremities:  +2 pulses on all extremities; no edema. Skin:  Warm Neurologic:   Awake and alert, motor grossly nonfocal. 
 
DATA:  
Current Facility-Administered Medications Medication Dose Route Frequency  influenza vaccine 2019-20 (6 mos+)(PF) (FLUARIX/FLULAVAL/FLUZONE QUAD) injection 0.5 mL  0.5 mL IntraMUSCular PRIOR TO DISCHARGE  losartan (COZAAR) tablet 100 mg  100 mg Oral DAILY  amLODIPine (NORVASC) tablet 5 mg  5 mg Oral DAILY  diphenhydrAMINE (BENADRYL) 12.5 mg/5 mL oral elixir 12.5-25 mg  12.5-25 mg Oral Q6H PRN  
 hydrALAZINE (APRESOLINE) 20 mg/mL injection 10 mg  10 mg IntraVENous Q6H PRN  
 docusate sodium (COLACE) capsule 100 mg  100 mg Oral BID  acetaminophen (TYLENOL) suppository 650 mg  650 mg Rectal Q4H PRN  
 ondansetron (ZOFRAN) injection 4 mg  4 mg IntraVENous Q4H PRN  
 bisacodyL (DULCOLAX) suppository 10 mg  10 mg Rectal DAILY PRN  
 metoprolol tartrate (LOPRESSOR) tablet 50 mg  50 mg Oral DAILY  morphine injection 2 mg  2 mg IntraVENous Q4H PRN  
 naloxone (NARCAN) injection 0.4 mg  0.4 mg IntraVENous EVERY 2 MINUTES AS NEEDED Labs: 
Recent Labs  
  02/19/20 
0147 02/18/20 
0217 02/17/20 
3605 WBC 6.0 5.6 5.5 HGB 10.6* 10.8* 10.3* HCT 32.7* 33.6* 32.1* PLT 63* 48* 59* Recent Labs  
  02/19/20 
0147 02/18/20 
1303 * 144  
K 3.6 4.1 * 112* CO2 24 24 * 106* BUN 18 18 CREA 0.94 0.96  
CA 8.6 8.9 No results for input(s): PH, PCO2, PO2, HCO3, FIO2 in the last 72 hours. IMPRESSION:  
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 
  
  
  
PLAN:  
· H/H 10.6/32.7, platelet count 49,179 no intervention warranted at this time. We will offer Retacrit if hemoglobin goes below 10g/dl and hematocrit goes below 30% while inpatient. · Tentative plan is for Bone Marrow Bx today to r/o multiple myeloma versus MGUS. IR will contact the POA for consent. · The patient is: [x] acutely ill Risk of deterioration: [] moderate  
 [] critically ill  [] high My assessment/plan was discussed with: 
[x]nursing []PT/OT   
[]respiratory therapy []family [] Alma Rosa Gilbert MD

## 2020-02-19 NOTE — PROGRESS NOTES
Hematology/Medical Oncology Progress Note Name: Sedrick Alonso : 1927 MRN: [id number] Date: 2020 11:42 AM 
  
[x]I have reviewed the flowsheet and previous days notes. Events overnight reviewed and discussed with nursing staff. Vital signs and records reviewed. Ms. 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 She was last seen by Dr. Perla Sanders in his office on 2018, at which time we were concerned that she had a smoldering multiple myeloma versus MGUS. The patient was scheduled to begin gammaglobulin therapy as a primary treatment modality while waiting for the results to define will rule out multiple myeloma. In addition, she was scheduled to follow-up with Dr. Perla Sanders in 2 to 3 weeks after the initial consult to discuss potential options of management based on the additional lab data and radiographic data. Unfortunately, I did not see in the EMR with the patient had the bone marrow biopsy done. However the SPEP demonstrated no M-spike, with an increase in gammaglobulin at 2.2 and total globulin at 4.2; free kappa light chains 58.6, free lambda light chains 28.6, kappa/lambda ratio 2.0.  immunofixation shows IgG monoclonal protein with kappa light chain specificity. An apparent polyclonal gammopathy: IgG. Kappa and lambda typing appear increased. Beta-2 microglobulin, serum 2.4 
           
ROS: 
Unable to obtain due to patient status. Vital Signs:   
Visit Vitals BP (!) 184/91 Pulse 72 Temp 98 °F (36.7 °C) Resp 18 Ht 5' 1\" (1.549 m) Wt 54.4 kg (120 lb) SpO2 99% BMI 22.67 kg/m² O2 Device: Room air O2 Flow Rate (L/min): 3 l/min Temp (24hrs), Av.1 °F (36.7 °C), Min:97.7 °F (36.5 °C), Max:98.8 °F (37.1 °C) Intake/Output:  
Last shift:      No intake/output data recorded. Last 3 shifts: No intake/output data recorded. No intake or output data in the 24 hours ending 02/19/20 1533 Physical Exam: 
General: Resting in bed, NAD HEENT:  Anicteric sclerae; pink palpebral conjunctivae; mucosa moist 
Resp:  Symmetrical chest expansion, no accessory muscle use; good airway entry; no rales/ wheezing/ rhonchi noted CV:  S1, S2 present; regular rate and rhythm GI:  Abdomen soft, (+) active bowel sounds Extremities:  +2 pulses on all extremities; no edema. Skin:  Warm Neurologic:   Awake and alert, motor grossly nonfocal. 
 
DATA:  
Current Facility-Administered Medications Medication Dose Route Frequency  influenza vaccine 2019-20 (6 mos+)(PF) (FLUARIX/FLULAVAL/FLUZONE QUAD) injection 0.5 mL  0.5 mL IntraMUSCular PRIOR TO DISCHARGE  losartan (COZAAR) tablet 100 mg  100 mg Oral DAILY  amLODIPine (NORVASC) tablet 5 mg  5 mg Oral DAILY  diphenhydrAMINE (BENADRYL) 12.5 mg/5 mL oral elixir 12.5-25 mg  12.5-25 mg Oral Q6H PRN  
 hydrALAZINE (APRESOLINE) 20 mg/mL injection 10 mg  10 mg IntraVENous Q6H PRN  
 docusate sodium (COLACE) capsule 100 mg  100 mg Oral BID  acetaminophen (TYLENOL) suppository 650 mg  650 mg Rectal Q4H PRN  
 ondansetron (ZOFRAN) injection 4 mg  4 mg IntraVENous Q4H PRN  
 bisacodyL (DULCOLAX) suppository 10 mg  10 mg Rectal DAILY PRN  
 metoprolol tartrate (LOPRESSOR) tablet 50 mg  50 mg Oral DAILY  morphine injection 2 mg  2 mg IntraVENous Q4H PRN  
 naloxone (NARCAN) injection 0.4 mg  0.4 mg IntraVENous EVERY 2 MINUTES AS NEEDED Labs: 
Recent Labs  
  02/19/20 
0147 02/18/20 
0217 02/17/20 
1982 WBC 6.0 5.6 5.5 HGB 10.6* 10.8* 10.3* HCT 32.7* 33.6* 32.1* PLT 63* 48* 59* Recent Labs  
  02/19/20 
0147 02/18/20 
1303 * 144  
K 3.6 4.1 * 112* CO2 24 24 * 106* BUN 18 18 CREA 0.94 0.96  
CA 8.6 8.9 No results for input(s): PH, PCO2, PO2, HCO3, FIO2 in the last 72 hours. IMPRESSION:  
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 
  
  
  
PLAN:  
· H/H 10.6/32.7, platelet count 51,718 no intervention warranted at this time. We will offer Retacrit if hemoglobin goes below 10g/dl and hematocrit goes below 30% while inpatient. · Tentative plan is for Bone Marrow Bx today to r/o multiple myeloma versus MGUS. IR are calling POA for consent. · The patient is: [x] acutely ill Risk of deterioration: [] moderate  
 [] critically ill  [] high My assessment/plan was discussed with: 
[x]nursing []PT/OT   
[]respiratory therapy [x]Dr.Lloyd Arabella Simon MD  
  
[]family [] Jailene Woodard NP

## 2020-02-19 NOTE — ROUTINE PROCESS
End of Shift Note Bedside and verbal shift change report given to [facility],, RN (On coming nurse) by Nataliia Pavon RN (Off going nurse). Report included the following information:  
   --Procedure Summary 
   --MAR, 
   --Recent Results --Med Rec Status SBAR Recommendations: Monitor BP Issues for Provider to address N/A Activity This Shift 
 
 [] Bed Rest Order 
 [] Refused 
 [] Dangled  
 [] TDWB Ambulating: 
   [] Bathroom [] BSC [] Room/Hallway Up in Chair for meals []Yes [x] No  
Voiding       [x] Yes  [] No 
[place]          [] Yes  [x] No 
Incontinent [x] Yes  [] No 
 
DUE TO VOID N/A POUR        [] Yes [x] No 
[place]    [] Yes [x] No 
New Onset [] Yes [x] No Straight Cath   []Yes  [x] No 
Condom Cath  [] Yes [x] No 
MD Called      [] Yes  [x] No  
Blood Sugars Managed []Yes [x] No   
Bowels Moved [] Yes [x] No 
 
Incontinent     [x] Yes [] No Passed Gas [x]Yes [] No 
 
New Onset  []Yes [x] No 
  
 
 MD Called []Yes  [x] No 
  
CHG Bath Done Before Surgery After Surgery  
  
[] Yes  [] No 
[] Yes  [] No   
  
Drain Removed [] Yes  [] No [x] N/A Dressing Changed [] Yes   [] No [x] N/A Nausea/Vomiting [] Yes   [x] No    
Ice Packs Changed [] Yes   [] No  [x] N/A Incentive Spirometer  [x] Yes  [] No     
SCD Pumps On Ankle Pumping  [x] Yes   [] No  
  
[] Yes   [x] No    
  
Telemetry Monitoring [x] Yes   [] No   Rhythm NSR

## 2020-02-19 NOTE — ROUTINE PROCESS
Bedside shift change report given to Alexx Reynoso RN (oncoming nurse) by Faye Woodard RN (offgoing nurse). Report included the following information SBAR, Kardex, Intake/Output, MAR, Recent Results and Med Rec Status.

## 2020-02-20 NOTE — PROCEDURES
RADIOLOGY POST PROCEDURE NOTE February 20, 2020       9:48 AM  
 
Preoperative Diagnosis: Thrombocytopenia. Myeloma? Postoperative Diagnosis:  Same. :  Dr. Laquita Varghese Assistant:  None. Type of Anesthesia: 1% plain lidocaine and IV moderate sedation with versed and fentanyl. Procedure/Description:  Image guided bone marrow Bx. Findings:   No bleeding. Estimated blood Loss:  Minimal 
 
Specimen Removed:   yes Blood transfusions:  None. Implants:  None. Complications: None Condition: Stable Discharge Plan:  continue present therapy Christopher Castellano MD

## 2020-02-20 NOTE — PROGRESS NOTES
Visit Vitals BP (!) 208/99 Pulse 70 Temp 96.8 °F (36 °C) Resp 20 Ht 5' 1\" (1.549 m) Wt 54.4 kg (120 lb) SpO2 100% Breastfeeding No  
BMI 22.67 kg/m² MEWS 3; MD notified; medicated per [facility]

## 2020-02-20 NOTE — PROGRESS NOTES
Problem: Falls - Risk of 
Goal: *Absence of Falls Description Document [place] McDuffie Fall Risk and appropriate interventions in the flowsheet. Outcome: Progressing Towards Goal 
Note: Fall Risk Interventions: 
Mobility Interventions: OT consult for ADLs, PT Consult for mobility concerns, PT Consult for assist device competence, Utilize walker, cane, or other assistive device Mentation Interventions: Bed/chair exit alarm, Door open when patient unattended, Room close to nurse's station Medication Interventions: Patient to call before getting OOB, Bed/chair exit alarm Elimination Interventions: Call light in reach, Bed/chair exit alarm History of Falls Interventions: Bed/chair exit alarm, Door open when patient unattended Problem: Patient Education: Go to Patient Education Activity Goal: Patient/Family Education Outcome: Progressing Towards Goal 
  
Problem: Pressure Injury - Risk of 
Goal: *Prevention of pressure injury Description Document Sam Scale and appropriate interventions in the flowsheet. Outcome: Progressing Towards Goal 
Note: Pressure Injury Interventions: 
Sensory Interventions: Pressure redistribution bed/mattress (bed type), Minimize linen layers Moisture Interventions: Absorbent underpads Activity Interventions: PT/OT evaluation, Pressure redistribution bed/mattress(bed type) Mobility Interventions: PT/OT evaluation, Pressure redistribution bed/mattress (bed type) Nutrition Interventions: Document food/fluid/supplement intake Friction and Shear Interventions: HOB 30 degrees or less Problem: Patient Education: Go to Patient Education Activity Goal: Patient/Family Education Outcome: Progressing Towards Goal 
  
Problem: Patient Education: Go to Patient Education Activity Goal: Patient/Family Education Outcome: Progressing Towards Goal 
  
Problem: Patient Education: Go to Patient Education Activity Goal: Patient/Family Education Outcome: Progressing Towards Goal 
  
Problem: Pain Goal: *Control of Pain Outcome: Progressing Towards Goal 
  
Problem: Patient Education: Go to Patient Education Activity Goal: Patient/Family Education Outcome: Progressing Towards Goal 
  
Problem: Patient Education: Go to Patient Education Activity Goal: Patient/Family Education Outcome: Progressing Towards Goal 
  
Problem: Patient Education: Go to Patient Education Activity Goal: Patient/Family Education Outcome: Progressing Towards Goal 
  
Problem: Hip Fracture: Post-Op Day 3 Goal: Off Pathway (Use only if patient is Off Pathway) Outcome: Progressing Towards Goal 
Goal: Activity/Safety Outcome: Progressing Towards Goal 
Goal: Diagnostic Test/Procedures Outcome: Progressing Towards Goal 
Goal: Nutrition/Diet Outcome: Progressing Towards Goal 
Goal: Medications Outcome: Progressing Towards Goal 
Goal: Respiratory Outcome: Progressing Towards Goal 
Goal: Treatments/Interventions/Procedures Outcome: Progressing Towards Goal 
Goal: Psychosocial 
Outcome: Progressing Towards Goal 
Goal: Discharge Planning Outcome: Progressing Towards Goal 
Goal: *Met physical therapy criteria for discharge to next level of care Outcome: Progressing Towards Goal 
Goal: *Optimal pain control at patient's stated goal 
Outcome: Progressing Towards Goal 
Goal: *Hemodynamically stable Outcome: Progressing Towards Goal 
Goal: *Tolerating diet Outcome: Progressing Towards Goal 
Goal: *Active bowel function Outcome: Progressing Towards Goal 
Goal: *Adequate urinary output Outcome: Progressing Towards Goal 
Goal: *Absence of skin breakdown Outcome: Progressing Towards Goal 
Goal: *Patient verbalizes understanding of discharge instructions Outcome: Progressing Towards Goal

## 2020-02-20 NOTE — CONSULTS
Consult Note Patient: Fariba Viramontes               Sex: female          DOA: 2/13/2020 YOB: 1927      Age:  80 y.o.        LOS:  LOS: 6 days HPI:  
 
Fariba Viramontes is a 80 y.o. female who has been seen in evaluation of thrombocytopenia and monoclonal gammopathy at the request of Lion Avalos NP. Patient has a history of HTN, GERD, afib, osteoporosis, monoclonal gammopathy, thrombocytopenia and dementia. She presented to the ED after a fall and was found to have a right hip fracture s/p fixation on 2/14. Past Medical History:  
Diagnosis Date  Anemia 1/11/2010  
 thrombocytopenia  Anxiety 1/11/2010  Atrial fibrillation ([address] 1/11/2010  Atrial flutter ([address] recurrent  CAD (coronary artery disease) 8/3/2010  Cataract 1/11/2010  Coronary atherosclerosis of native coronary artery  CVA (cerebral infarction)  Dementia ([address] 2/14/2020  Depression 3/18/2014  Diverticulitis 11/19/2014  DVT (deep venous thrombosis) ([address] 1/1/2007  Dysarthria  Essential hypertension, benign  GERD (gastroesophageal reflux disease) 1/11/2010  
 History of blood transfusion 2/16/2016  History of cardiac pacemaker 2/14/2020  HLD (hyperlipidemia) 1/11/2010  
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  
Procedure Laterality Date  CARDIAC SURG PROCEDURE UNLIST    
 ablation  HX CATARACT REMOVAL    
 bilateral  
 HX CHOLECYSTECTOMY  HX OTHER SURGICAL    
 IVC filter  HX PACEMAKER    
  IR IVC FILTER PLACEMENT PERCUTANEOUS  2007 Family History Problem Relation Age of Onset Hernandez Arthritis-osteo Mother  Hypertension Mother  Arthritis-osteo Father  Coronary Artery Disease Neg Hx Social History Socioeconomic History  Marital status:  Spouse name: Not on file  Number of children: Not on file  Years of education: Not on file  Highest education level: Not on file Tobacco Use  Smoking status: Never Smoker  Smokeless tobacco: Never Used Substance and Sexual Activity  Alcohol use: No  
  Alcohol/week: 0.0 standard drinks  Drug use: No  
 Sexual activity: Never Prior to Admission medications Medication Sig Start Date End Date Taking? Authorizing Provider  
metoprolol tartrate (LOPRESSOR) 50 mg tablet TAKE ONE TABLET BY MOUTH ONCE DAILY 2/12/20   Buddy Jon NP  
furosemide (LASIX) 20 mg tablet TAKE 1 TABLET BY MOUTH ONCE DAILY 9/10/19   Buddy Jon NP  
losartan (COZAAR) 50 mg tablet TAKE 1 TABLET BY MOUTH TWICE DAILY 9/10/19   Buddy Jon NP  
clobetasol (TEMOVATE) 0.05 % topical cream Apply  to affected area two (2) times a day. 2/21/19   Buddy Jon NP  
mupirocin (BACTROBAN) 2 % ointment Apply  to affected area daily. 2/21/19   Buddy Jon NP  
IRON, FERROUS SULFATE, 325 mg (65 mg iron) tablet TAKE 1 TABLET BY MOUTH ONCE DAILY BEFORE  BREAKFAST 2/9/19   Buddy Jon NP  
polyethylene glycol (MIRALAX) 17 gram/dose powder Take 17 g by mouth daily. 11/19/14   Lenny Mckay DO  
senna-docusate (PERICOLACE) 8.6-50 mg per tablet Take 1 tablet by mouth daily. 11/6/14   Ciara Fishman MD  
 
 
Allergies Allergen Reactions  Pcn [Penicillins] Hives Review of Systems Pertinent items are noted in the History of Present Illness. Physical Exam:  
  
Visit Vitals BP (!) 178/100 (BP 1 Location: Left arm, BP Patient Position: Prone) Pulse 79 Temp 97.4 °F (36.3 °C) Resp 24 Ht 5' 1\" (1.549 m) Wt 54.4 kg (120 lb) SpO2 99% Breastfeeding No  
BMI 22.67 kg/m² Physical Exam: 
Constitutional: Alert. Cooperative. No distress. Respiratory: Normal respiratory effort. Symmetrical rise and fall of chest.  
Cardiovascular: Regular rate. Gastrointestinal: Soft, NT, ND. Labs Reviewed: 
CMP: No results found for: NA, K, CL, CO2, AGAP, GLU, BUN, CREA, GFRAA, GFRNA, CA, MG, PHOS, ALB, TBIL, TP, ALB, GLOB, AGRAT, SGOT, ALT, GPT 
CBC:  
Lab Results Component Value Date/Time WBC 7.1 02/20/2020 03:48 AM  
 HGB 11.8 (L) 02/20/2020 03:48 AM  
 HCT 36.8 02/20/2020 03:48 AM  
 PLT 67 (L) 02/20/2020 03:48 AM  
 
COAGS: No results found for: APTT, PTP, INR, INREXT, INREXT Assessment/Plan Principal Problem: 
  Closed right hip fracture ([facility] (2/14/2020) Active Problems: 
  HTN (hypertension) (1/11/2010) GERD (gastroesophageal reflux disease) (1/11/2010) Overview: Dr. Oren Le Thrombocytopenia ([facility] (4/18/2015) Osteoporosis (1/4/2016) Monoclonal gammopathy of undetermined significance (6/5/2018) History of cardiac pacemaker (2/14/2020) Dementia ([facility] (2/14/2020) Tobias Bergeron is a 80 y.o. female with a history of monoclonal gammopathy and thrombocytopenia with suspicion for smoldering myeloma. Plan Case and images reviewed by Dr. Su Epstein. Given her persistent thrombocytopenia and inpatient requirements, will plan for image-guided bone marrow biopsy with moderate sedation as IR schedule allows. Patient to remain NPO with blood thinning medications held. Consent to be obtained from family member prior to procedure.

## 2020-02-20 NOTE — ROUTINE PROCESS
Called report on patient to [facility] at [facility]. Report included patient's SBAR, procedure summary, Intake/Output, and MAR. Opportunity for clarification and questions was provided. Patient unable to sign discharge paperwork due to AO x 1-2. Daughter is aware of patient transferring to new facility per , Roger Kasper.

## 2020-02-20 NOTE — PROGRESS NOTES
Baldomero Ireland (COLIN) called [facility] transportation at [phone] to schedule a 4:00 pm stretcher transport to [facility] with Vincent Marroquin and received confirmation number [id number]. Per Vincent Marroquin, could not call [facility] but added to comments [facility] was requested to transport patient at 4:00 pm to [address] [place] [address]. Dispatch will call the nurse's station with name of provider and ETA. Bennett Ireland of transportation conversation and arrangements.

## 2020-02-20 NOTE — PROGRESS NOTES
Hematology/Medical Oncology Progress Note Name: William Mayers : 1927 MRN: [id number] Date: 2020 08:02 AM 
  
[x]I have reviewed the flowsheet and previous days notes. Events overnight reviewed and discussed with nursing staff. Vital signs and records reviewed. Ms. 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 She was last seen in the oncology office in his on 2018, at which time we were concerned that she had a smoldering multiple myeloma versus MGUS. The patient was scheduled to begin gammaglobulin therapy as a primary treatment modality while waiting for the results to define will rule out multiple myeloma. In addition, she was scheduled to follow-up in the hematology/oncology clinic in 2 to 3 weeks after the initial consult to discuss potential options of management based on the additional lab data and radiographic data. Unfortunately, I did not see in the EMR with the patient had the bone marrow biopsy done. However the SPEP demonstrated no M-spike, with an increase in gammaglobulin at 2.2 and total globulin at 4.2; free kappa light chains 58.6, free lambda light chains 28.6, kappa/lambda ratio 2.0.  immunofixation shows IgG monoclonal protein with kappa light chain specificity. An apparent polyclonal gammopathy: IgG. Kappa and lambda typing appear increased. Beta-2 microglobulin, serum 2.4 
           
ROS: 
Unable to obtain due to patient status. Vital Signs:   
Visit Vitals /88 Pulse 79 Temp 97.4 °F (36.3 °C) Resp 14 Ht 5' 1\" (1.549 m) Wt 54.4 kg (120 lb) SpO2 100% Breastfeeding No  
BMI 22.67 kg/m² O2 Device: Room air O2 Flow Rate (L/min): 3 l/min Temp (24hrs), Av.7 °F (36.5 °C), Min:96.8 °F (36 °C), Max:99.1 °F (37.3 °C) Intake/Output: Last shift:      No intake/output data recorded. Last 3 shifts: No intake/output data recorded. No intake or output data in the 24 hours ending 02/20/20 0802 Physical Exam: 
General: Resting in bed, NAD HEENT:  Anicteric sclerae; pink palpebral conjunctivae; mucosa moist 
Resp:  Symmetrical chest expansion, no accessory muscle use; good airway entry; no rales/ wheezing/ rhonchi noted CV:  S1, S2 present; regular rate and rhythm GI:  Abdomen soft, (+) active bowel sounds Extremities:  +2 pulses on all extremities; no edema. Skin:  Warm Neurologic:   Awake and alert, motor grossly nonfocal. 
 
DATA:  
Current Facility-Administered Medications Medication Dose Route Frequency  dextrose 5 % - 0.45% NaCl infusion  75 mL/hr IntraVENous CONTINUOUS  
 influenza vaccine 2019-20 (6 mos+)(PF) (FLUARIX/FLULAVAL/FLUZONE QUAD) injection 0.5 mL  0.5 mL IntraMUSCular PRIOR TO DISCHARGE  losartan (COZAAR) tablet 100 mg  100 mg Oral DAILY  amLODIPine (NORVASC) tablet 5 mg  5 mg Oral DAILY  diphenhydrAMINE (BENADRYL) 12.5 mg/5 mL oral elixir 12.5-25 mg  12.5-25 mg Oral Q6H PRN  
 hydrALAZINE (APRESOLINE) 20 mg/mL injection 10 mg  10 mg IntraVENous Q6H PRN  
 docusate sodium (COLACE) capsule 100 mg  100 mg Oral BID  acetaminophen (TYLENOL) suppository 650 mg  650 mg Rectal Q4H PRN  
 ondansetron (ZOFRAN) injection 4 mg  4 mg IntraVENous Q4H PRN  
 bisacodyL (DULCOLAX) suppository 10 mg  10 mg Rectal DAILY PRN  
 metoprolol tartrate (LOPRESSOR) tablet 50 mg  50 mg Oral DAILY  morphine injection 2 mg  2 mg IntraVENous Q4H PRN  
 naloxone (NARCAN) injection 0.4 mg  0.4 mg IntraVENous EVERY 2 MINUTES AS NEEDED Labs: 
Recent Labs  
  02/20/20 
0348 02/19/20 
0147 02/18/20 
0217 WBC 7.1 6.0 5.6 HGB 11.8* 10.6* 10.8* HCT 36.8 32.7* 33.6* PLT 67* 63* 48* Recent Labs  
  02/19/20 
0147 02/18/20 
1303 * 144  
K 3.6 4.1 * 112* CO2 24 24 * 106* BUN 18 18 CREA 0.94 0.96  
CA 8.6 8.9 No results for input(s): PH, PCO2, PO2, HCO3, FIO2 in the last 72 hours. IMPRESSION:  
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 
  
  
  
PLAN:  
· H/H 11.8 g/dL / 36.6%, platelet count 03,782 no intervention warranted at this time. We will offer Retacrit if hemoglobin goes below 10g/dl and hematocrit goes below 30% while inpatient. · Tentative plan is for Bone Marrow Bx today to r/o multiple myeloma versus MGUS. IR will contact the POA for consent. · The patient is: [x] acutely ill Risk of deterioration: [] moderate  
 [] critically ill  [] high My assessment/plan was discussed with: 
[x]nursing []PT/OT   
[]respiratory therapy []family [] Bard Guillaume MD

## 2020-02-20 NOTE — PROGRESS NOTES
X2 PT tx attempted at  (off floor) and 1007 (just returned from bone marrow biopsy and in direct nursing care). Will follow up at later time/date if appropriate. ISRRAEL Hook

## 2020-02-20 NOTE — PROGRESS NOTES
Problem: Falls - Risk of 
Goal: *Absence of Falls Description Document Marlin Paniagua Fall Risk and appropriate interventions in the flowsheet. Outcome: Progressing Towards Goal 
Note: Fall Risk Interventions: 
Mobility Interventions: Communicate number of staff needed for ambulation/transfer, Patient to call before getting OOB, PT Consult for mobility concerns, Utilize walker, cane, or other assistive device Mentation Interventions: Bed/chair exit alarm, Adequate sleep, hydration, pain control, More frequent rounding, Update white board Medication Interventions: Assess postural VS orthostatic hypotension, Patient to call before getting OOB, Teach patient to arise slowly Elimination Interventions: Call light in reach History of Falls Interventions: Bed/chair exit alarm, Room close to nurse's station, Vital signs minimum Q4HRs X 24 hrs (comment for end date) Problem: Patient Education: Go to Patient Education Activity Goal: Patient/Family Education Outcome: Progressing Towards Goal 
  
Problem: Pressure Injury - Risk of 
Goal: *Prevention of pressure injury Description Document Sam Scale and appropriate interventions in the flowsheet. Outcome: Progressing Towards Goal 
Note: Pressure Injury Interventions: 
Sensory Interventions: Keep linens dry and wrinkle-free, Minimize linen layers Moisture Interventions: Absorbent underpads, Minimize layers Activity Interventions: Pressure redistribution bed/mattress(bed type) Mobility Interventions: Pressure redistribution bed/mattress (bed type) Nutrition Interventions: Document food/fluid/supplement intake, Offer support with meals,snacks and hydration Friction and Shear Interventions: Lift team/patient mobility team 
 
  
 
 
 
  
Problem: Patient Education: Go to Patient Education Activity Goal: Patient/Family Education Outcome: Progressing Towards Goal 
  
Problem: Patient Education: Go to Patient Education Activity Goal: Patient/Family Education Outcome: Progressing Towards Goal 
  
Problem: Patient Education: Go to Patient Education Activity Goal: Patient/Family Education Outcome: Progressing Towards Goal 
  
Problem: Pain Goal: *Control of Pain Outcome: Progressing Towards Goal 
  
Problem: Patient Education: Go to Patient Education Activity Goal: Patient/Family Education Outcome: Progressing Towards Goal 
  
Problem: Patient Education: Go to Patient Education Activity Goal: Patient/Family Education Outcome: Progressing Towards Goal 
  
Problem: Patient Education: Go to Patient Education Activity Goal: Patient/Family Education Outcome: Progressing Towards Goal 
  
Problem: Hip Fracture: Post-Op Day 3 Goal: Off Pathway (Use only if patient is Off Pathway) Outcome: Progressing Towards Goal 
Goal: Activity/Safety Outcome: Progressing Towards Goal 
Goal: Diagnostic Test/Procedures Outcome: Progressing Towards Goal 
Goal: Nutrition/Diet Outcome: Progressing Towards Goal 
Goal: Medications Outcome: Progressing Towards Goal 
Goal: Respiratory Outcome: Progressing Towards Goal 
Goal: Treatments/Interventions/Procedures Outcome: Progressing Towards Goal 
Goal: Psychosocial 
Outcome: Progressing Towards Goal 
Goal: Discharge Planning Outcome: Progressing Towards Goal 
Goal: *Met physical therapy criteria for discharge to next level of care Outcome: Progressing Towards Goal 
Goal: *Optimal pain control at patient's stated goal 
Outcome: Progressing Towards Goal 
Goal: *Hemodynamically stable Outcome: Progressing Towards Goal 
Goal: *Tolerating diet Outcome: Progressing Towards Goal 
Goal: *Active bowel function Outcome: Progressing Towards Goal 
Goal: *Adequate urinary output Outcome: Progressing Towards Goal 
Goal: *Absence of skin breakdown Outcome: Progressing Towards Goal 
Goal: *Patient verbalizes understanding of discharge instructions Outcome: Progressing Towards Goal

## 2020-02-20 NOTE — PROGRESS NOTES
Preprocedure Assessment Today 2/20/2020 Indication/Symptoms:   William Mayers is a 80 y.o. female with a history of thrombocytopenia who presents to IR for an image-guided bone marrow biopsy with moderate sedation. Medications and labs reviewed. Patient has been NPO since midnight. Blood thinners held. The H & P and/or progress notes and any available imaging were reviewed. The risks, indications and possible alternatives to the procedure, including doing nothing, were discussed and informed consent was obtained. Physical Exam: 
  
 Heart:  Regular rate Lungs:  Normal respiratory effort The patient is an appropriate candidate to undergo the planned procedure and sedation.  
 
Gertrude Queen

## 2020-02-20 NOTE — ROUTINE PROCESS
Bedside shift change report given to Marifer Reyes RN (oncoming nurse) by Luis Manuel Verdin RN (offgoing nurse). Report included the following information SBAR, Kardex, Intake/Output, MAR, Recent Results and Med Rec Status.

## 2020-02-20 NOTE — DISCHARGE SUMMARY
Discharge Summary Patient: Noble Byrne MRN: [id number]  CSN: [id number] YOB: 1927  Age: 80 y.o. Sex: female DOA: 2/13/2020 LOS:  LOS: 6 days   Discharge Date:   
 
Admission Diagnoses: Closed right hip fracture ([address] Arelizabethshar Morris Discharge Diagnoses:   
Closed right hip fracture status post ORIF/screw fixation Hypertension Chronic thrombocytopenia status post 2 units platelets prior to surgery Acute postoperative anemia MGUS Dementia History cardiac pacemaker implantation Discharge Condition: Stable PHYSICAL EXAM 
Visit Vitals /53 (BP 1 Location: Left arm, BP Patient Position: At rest) Pulse 70 Temp 97.2 °F (36.2 °C) Resp 15 Ht 5' 1\" (1.549 m) Wt 54.4 kg (120 lb) SpO2 100% Breastfeeding No  
BMI 22.67 kg/m² General: Alert, cooperative, no acute distress   
HEENT: NCAT. Sclerae anicteric, EOMI. Lungs:  Clear, no wheezes. Effort nonlabored. Heart:  RRR. Abdomen: Soft nontender, nondistended. NABS present. Extremities: Warm, no edema or ischemia. Psych:   Mood normal, no anxiety or agitation. Neurologic:  Awake and alert, moves extremities spontaneously. Motor grossly nonfocal. 
 
 
Hospital Course:  
See admission H&P for full details of HPI. Patient was admitted to the hospital with orthopedic surgery in consultation for evaluation and treatment of a right hip fracture. She was taken to the operating room on 2/4/2020 for surgical fixation of her right hip fracture. Patient tolerated the procedure well and has had no issues in recovery with regard to her surgery. PT and OT evaluations have been obtained and recommendation has been for skilled nursing placement as patient is working well with therapy. Hematology oncology evaluation has been obtained this hospitalization as well due to patient's history of MGUS. Bone marrow biopsy was done on 2/20/2020 and patient will need outpatient oncology follow-up as directed. Patient is medically stable for transition to skilled nursing placement today with outpatient oncology and orthopedic follow-up as advised. Patient's blood pressure has been elevated at times this hospitalization and may need further medication adjustment when she arrives to the facility. Consults:  
Hematology oncology Orthopedic surgery Significant Diagnostic Studies:  
Right hip x-ray: 
IMPRESSION: 
  
Status post open reduction and fixation of a basicervical fracture of the right 
femur, as described. CT C-spine IMPRESSION: 
1. No acute cervical spine fracture 
  
2.  Multilevel degenerative disc disease, facet arthropathy and anterolisthesis 
of C7 on T1. 
  
3. Right-sided pleural effusion. 
  
4. Interstitial thickening of the lung apices CT R lower extremity IMPRESSION IMPRESSION:  
1. Impacted right hip fracture. CT head: 
  
IMPRESSION IMPRESSION: 
1. No acute intracranial process identified. 
  
2. Moderate parenchymal volume loss and chronic small vessel ischemic changes. CXR: 
IMPRESSION: 
  
Suboptimal inspiration; no consolidations. Possible midlung interstitial 
opacities/atelectasis. Mild cardiomegaly. Pacemaker. Discharge Medications:    
Current Discharge Medication List  
  
START taking these medications Details  
amLODIPine (NORVASC) 10 mg tablet Take 1 Tab by mouth daily. Qty: 30 Tab, Refills: 0 HYDROcodone-acetaminophen (NORCO) 5-325 mg per tablet Take 1 Tab by mouth every six (6) hours as needed for Pain (moderate-severe pain) for up to 3 days. Max Daily Amount: 4 Tabs. Qty: 6 Tab, Refills: 0 Associated Diagnoses: Closed fracture of right hip, initial encounter ([facility] CONTINUE these medications which have CHANGED Details  
losartan (COZAAR) 100 mg tablet TAKE 1 TABLET BY MOUTH TWICE DAILY Qty: 30 Tab, Refills: 1 CONTINUE these medications which have NOT CHANGED Details metoprolol tartrate (LOPRESSOR) 50 mg tablet TAKE ONE TABLET BY MOUTH ONCE DAILY Qty: 90 Tab, Refills: 1 Associated Diagnoses: Essential hypertension  
  
polyethylene glycol (MIRALAX) 17 gram/dose powder Take 17 g by mouth daily. Qty: 527 g, Refills: 1 Associated Diagnoses: Constipation  
  
senna-docusate (PERICOLACE) 8.6-50 mg per tablet Take 1 tablet by mouth daily. Qty: 30 tablet, Refills: 0 STOP taking these medications  
  
 furosemide (LASIX) 20 mg tablet Comments:  
Reason for Stopping:   
   
 clobetasol (TEMOVATE) 0.05 % topical cream Comments:  
Reason for Stopping:   
   
 mupirocin (BACTROBAN) 2 % ointment Comments:  
Reason for Stopping:   
   
 IRON, FERROUS SULFATE, 325 mg (65 mg iron) tablet Comments:  
Reason for Stopping:   
   
  
 
 
Activity: As tolerated. Diet: Cardiac Disposition: Skilled nursing facility Follow-up: with MICHAEL as directed. Minutes spent on discharge: >30 minutes spent coordinating this discharge Maryla Cogan.  Jorge Rice MD

## 2020-02-20 NOTE — ROUTINE PROCESS
TRANSFER - IN REPORT: 
 
Verbal report received from Vonda Smith RN(name) on [facility]  being received from IR(unit) for routine progression of care Report consisted of patients Situation, Background, Assessment and  
Recommendations(SBAR). Information from the following report(s) SBAR, Kardex, Procedure Summary, Intake/Output, MAR and Recent Results was reviewed with the receiving nurse. Opportunity for questions and clarification was provided. Assessment completed upon patients arrival to unit and care assumed.

## 2020-02-20 NOTE — PROGRESS NOTES
TRANSFER - OUT REPORT: 
 
Verbal report given to Iveth ANTONIO(name) on [facility]  being transferred to (unit) for routine post - op    
 
**NO IV SEDATION GIVEN** 
 
Report consisted of patients Situation, Background, Assessment and  
Recommendations(SBAR). Information from the following report(s) SBAR, Kardex, Procedure Summary and MAR was reviewed with the receiving nurse. Lines:    
 
Opportunity for questions and clarification was provided. Patient transported with: 
 [employer]

## 2020-02-20 NOTE — PROGRESS NOTES
Transition of Care Plan to SNF/Rehab 
 
SNF/Rehab Transition: 
Patient has been accepted to [facility] and rehab and meets criteria for admission. Patient will  be transported by [facility] and expected to leave at . Communication to Patient/Family: Met with patient and daughter facundo (identified care giver) and they are agreeable to the transition plan. Communication to SNF/Rehab: 
Bedside RN, Keyla Morris, has been notified of the transition plan to the facility and to call report (phone number [phone]). Discharge information has been updated on the AVS. And communicated to facility via [facility], or CC link. SNF/Rehab Transition: PCP/Specialist:  
 
Nursing Please include all hard scripts for controlled substances, med rec and dc summary, and AVS in packet. Reviewed and confirmed with facility representative, Kourtney  at [facility] and rehab they can manage the patient care needs for the following:  
 
Deborah Otero with (X) only those applicable: 
 
Medication: 
[x]  Medications will be available at the facility []  IV Antibiotics []  Controlled Substance - hard copy to be sent with patient  
[]  Weekly Labs Documents: 
[x] Hard RX  Number sent  
[] MAR 
[] Kardex [x] AVS 
[] Wound care note [x]Transfer Summary/Discharge Summary Equipment: 
[]  CPAP/BiPAP []  Wound Vacuum 
[]  Richardson or Urinary Device 
[]  PICC/Central Line 
[]  Nebulizer 
[]  Ventilator Treatment: 
[]Isolation (for MRSA, VRE, etc.) []Surgical Drain Management []Tracheostomy Care 
[]Dressing Changes []Dialysis with transportation and chair time  Center Mode of tranpsort []PEG Care []Oxygen []Daily Weights for Heart Failure Dietary: 
[]Any diet limitations []Tube Feedings []Total Parenteral Management (TPN) Eligible for Medicaid Long Term Services and Supports Yes: 
[] Eligible for medical assistance or will become eligible within 180 days and LTSS completed. [] Provider/Patient and/or support system has requested screening. [x] LTSS copy provided to patient or responsible party, . 
[] LTSS unavailable at discharge will send once processed to SNF provider. 
[] LTSS  unavailable at discharged mailed to patient 
[] LTSS performed by outside agency  on  with tracking number No:  
[] Private pay and is not financially eligible for Medicaid within the next 180 days. [] Reside out-of-state. [] A residents of a state owned/operated facility that is licensed 
by [address] [employer] [facility] or [facility] 
[] Enrollment in [facility] hospice services 
[] [address] [place] [address] [address] 
[] Patient /Family declines to have screening completed or provide financial information for screening [id number] Financial Resources: 
Medicaid   
[] Initiated and application pending  
[x] Full coverage Advanced Care Plan: 
[]Surrogate Decision Maker of Care 
[]POA [x]Communicated Code Status/ sent  (DDNR, POST, Advance Directive) Other Mahendra Collins RN BSN Care Manager [phone]

## 2020-02-20 NOTE — PROGRESS NOTES
Bedside and Verbal shift change report given to RN by [facility]. Report included information from the following: SBAR, Kardex, and MAR.

## 2020-02-21 NOTE — Clinical Note
Patient currently at [facility] after right ORIF for hip fracture

## 2020-02-27 NOTE — PROGRESS NOTES
Community Care Team Documentation for Patient in [facility]     Patient discharged from [facility]  to [address] [place] [address], on 2/20/20. Hospital Discharge diagnosis per Dr. Keegan Busby:    Closed right hip fracture status post ORIF/screw fixation  Hypertension  Chronic thrombocytopenia status post 2 units platelets prior to surgery  Acute postoperative anemia  MGUS  Dementia  History cardiac pacemaker implantation    SNF Attending Provider:  Xander Mirza    Anticipated discharge date from SNF:  TBD    PCP : Hailee Ott NP    CTN: Freddy Cota [facility] Team rounds completed, updates provided by facility. Brief Summary of Care:    Patient receiving PT/OT. Making slow progress. Dispo:  Patient was living with daughter PTA. - and needed assistance with all ADL's . Goal is for pt to return to daughter's on discharge. Patient has KOFI so may need PCA on d/c. RRAT:  Medium Risk            15       Total Score        3 Has Seen PCP in Last 6 Months (Yes=3, No=0)    12 Charlson Comorbidity Score (Age + Comorbid Conditions)        Criteria that do not apply:    . Living with Significant Other. Assisted Living. LTAC. SNF. or   Rehab    Patient Length of Stay (>5 days = 3)    IP Visits Last 12 Months (1-3=4, 4=9, >4=11)    Pt.  Coverage (Medicare=5 , Medicaid, or Self-Pay=4)        Active Ambulatory Problems     Diagnosis Date Noted    HTN (hypertension) 01/11/2010    GERD (gastroesophageal reflux disease) 01/11/2010    Anxiety 01/11/2010    DVT (deep venous thrombosis) ([address] 01/01/2007    Anemia 01/11/2010    HLD (hyperlipidemia) 01/11/2010    Atrial fibrillation ([address] 01/11/2010    PSVT (paroxysmal supraventricular tachycardia) ([address] 01/11/2010    Syncope 05/13/2009    Cataract 01/11/2010    Right knee pain 01/11/2010    CAD (coronary artery disease) 08/03/2010    Hypertrophic cardiomyopathy ([address] 08/03/2010    Cardiomegaly 11/03/2010    Palpitations 11/03/2010    Atrial flutter (HCC)     Coronary atherosclerosis of native coronary artery     Essential hypertension, benign     Unspecified combined systolic and diastolic heart failure     Nonspecific abnormal electrocardiogram (ECG) (EKG)     Intermediate coronary syndrome ([address]     Depression 03/18/2014    GI bleed 10/29/2014    Diverticulitis 11/19/2014    S/P ablation operation for arrhythmia 04/10/2015    CVA (cerebral infarction) 04/16/2015    Acute right-sided weakness 04/18/2015    Thrombocytopenia (HCC) 04/18/2015    Osteoporosis 01/04/2016    Acute GI bleeding 02/02/2016    Acute blood loss anemia 02/03/2016    Diverticulosis 02/03/2016    History of blood transfusion 02/16/2016    Lower GI bleed 02/25/2016    Advance directive discussed with patient 04/28/2017    Monoclonal gammopathy of undetermined significance 06/05/2018    Closed right hip fracture ([address] 02/14/2020    History of cardiac pacemaker 02/14/2020    Dementia ([address] 02/14/2020     Resolved Ambulatory Problems     Diagnosis Date Noted    Pulmonary embolism ([address] 01/01/2007    Pre-operative cardiovascular examination      Past Medical History:   Diagnosis Date    Dysarthria     Stroke ([address]     Thrombocytosis ([address]

## 2020-02-28 NOTE — ED TRIAGE NOTES
Pt brought in via EMS from [facility] and rehab for being less active than usual. Per staff PT noticed pt to be more lethargic around 1030 AM. Pt was found to be hypotensive upon EMS arrival. Pt received 400 cc NS in route and upon arrival VSS. Pt AXOX1 at this time and has a hx of dementia.

## 2020-02-28 NOTE — ED TRIAGE NOTES
S 
Ms. Ted Juarez, a 81 yo female, came from [facility] and rehab for \"less active than usual.\" She became lethargic around 1030 am and hypotensive. She received some IVF and kept on saying \"8 am\" upon any question. Her daughter states that she is at her baseline. Past Medical History:  
Diagnosis Date  Anemia 1/11/2010  
 thrombocytopenia  Anxiety 1/11/2010  Atrial fibrillation ([address] 1/11/2010  Atrial flutter ([address] recurrent  CAD (coronary artery disease) 8/3/2010  Cataract 1/11/2010  Coronary atherosclerosis of native coronary artery  CVA (cerebral infarction)  Dementia ([address] 2/14/2020  Depression 3/18/2014  Diverticulitis 11/19/2014  DVT (deep venous thrombosis) ([address] 1/1/2007  Dysarthria  Essential hypertension, benign  GERD (gastroesophageal reflux disease) 1/11/2010  
 History of blood transfusion 2/16/2016  History of cardiac pacemaker 2/14/2020  HLD (hyperlipidemia) 1/11/2010  
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  
Procedure Laterality Date  CARDIAC SURG PROCEDURE UNLIST    
 ablation  HX CATARACT REMOVAL    
 bilateral  
 HX CHOLECYSTECTOMY  HX OTHER SURGICAL    
 IVC filter  HX PACEMAKER    
 IR BX BONE MARROW DIAGNOSTIC  2/20/2020  IR IVC FILTER PLACEMENT PERCUTANEOUS  2007 No current facility-administered medications for this encounter. Current Outpatient Medications:  
  losartan (COZAAR) 100 mg tablet, TAKE 1 TABLET BY MOUTH TWICE DAILY, Disp: 30 Tab, Rfl: 1   amLODIPine (NORVASC) 10 mg tablet, Take 1 Tab by mouth daily. , Disp: 30 Tab, Rfl: 0 
  metoprolol tartrate (LOPRESSOR) 50 mg tablet, TAKE ONE TABLET BY MOUTH ONCE DAILY, Disp: 90 Tab, Rfl: 1 
  polyethylene glycol (MIRALAX) 17 gram/dose powder, Take 17 g by mouth daily. , Disp: 527 g, Rfl: 1 
  senna-docusate (PERICOLACE) 8.6-50 mg per tablet, Take 1 tablet by mouth daily. , Disp: 30 tablet, Rfl: 0 Allergies Allergen Reactions  Pcn [Penicillins] Hives Social History Socioeconomic History  Marital status:  Spouse name: Not on file  Number of children: Not on file  Years of education: Not on file  Highest education level: Not on file Occupational History  Not on file Social Needs  Financial resource strain: Not on file  Food insecurity:  
  Worry: Not on file Inability: Not on file  Transportation needs:  
  Medical: Not on file Non-medical: Not on file Tobacco Use  Smoking status: Never Smoker  Smokeless tobacco: Never Used Substance and Sexual Activity  Alcohol use: No  
  Alcohol/week: 0.0 standard drinks  Drug use: No  
 Sexual activity: Never Lifestyle  Physical activity:  
  Days per week: Not on file Minutes per session: Not on file  Stress: Not on file Relationships  Social connections:  
  Talks on phone: Not on file Gets together: Not on file Attends Sikhism service: Not on file Active member of club or organization: Not on file Attends meetings of clubs or organizations: Not on file Relationship status: Not on file  Intimate partner violence:  
  Fear of current or ex partner: Not on file Emotionally abused: Not on file Physically abused: Not on file Forced sexual activity: Not on file Other Topics Concern  Not on file Social History Narrative  Not on file Family History Problem Relation Age of Onset [facility] Arthritis-osteo Mother  Hypertension Mother  Arthritis-osteo Father  Coronary Artery Disease Neg Hx O Visit Vitals /61 (BP 1 Location: Left arm, BP Patient Position: At rest) Pulse 80 Resp 18 Ht 5' 2\" (1.575 m) Wt 49.9 kg (110 lb) SpO2 99% BMI 20.12 kg/m² General: not oriented x3, no acute distress Heart: RRR with regular S1 and S2 without a murmur Lungs: CTA without wheezing, rhochi, rales Abd: nontender, normoactive bowel sounds Lower extremities: no edema A Alzheimer Hypoglycemia Anemia CVA Seizure P Dementia - CBC, CMP, U/A, UDS, CXR, CT without contrast, IVF prn HTN - continue her home meds

## 2020-02-28 NOTE — ED PROVIDER NOTES
EMERGENCY DEPARTMENT HISTORY AND PHYSICAL EXAM 
 
3:25 PM 
 
 
Date: 2/28/2020 Patient Name: Monika Costa History of Presenting Illness Chief Complaint Patient presents with  Altered mental status  Hypotension History Provided By: Patient's Daughter and EMS Location/Duration/Severity/Modifying factors 81yo F pmhx of CVA, HTN, dementia living at [facility] and rehab s/p R hip screw fixation here on 2/14/2020, s/p [employer]tr[employer] pacemaker, BIBA for report of not at baseline. Per EMS patient was evaluated by PT/OT this morning and found to be less active than normal - EMS called 2 hours later, found to have low BP reportedly 44D systolic stable blood sugar, given fluids enroute with improvement in BP and mentation. On arrival BP 335N systolic, moving all extremities upon command, not oriented, denying any pain. PCP: Olu Mane NP Current Facility-Administered Medications Medication Dose Route Frequency Provider Last Rate Last Dose  sodium chloride 0.9 % bolus infusion 1,000 mL  1,000 mL IntraVENous ONCE Andres Walker MD 1,000 mL/hr at 02/28/20 1658 1,000 mL at 02/28/20 1658  sodium chloride 0.9 % bolus infusion 500 mL  500 mL IntraVENous ONCE Andres Walker MD 1,000 mL/hr at 02/28/20 1721 500 mL at 02/28/20 1721 Current Outpatient Medications Medication Sig Dispense Refill  losartan (COZAAR) 100 mg tablet TAKE 1 TABLET BY MOUTH TWICE DAILY 30 Tab 1  
 amLODIPine (NORVASC) 10 mg tablet Take 1 Tab by mouth daily. 30 Tab 0  
 metoprolol tartrate (LOPRESSOR) 50 mg tablet TAKE ONE TABLET BY MOUTH ONCE DAILY 90 Tab 1  polyethylene glycol (MIRALAX) 17 gram/dose powder Take 17 g by mouth daily. 527 g 1  
 senna-docusate (PERICOLACE) 8.6-50 mg per tablet Take 1 tablet by mouth daily. 30 tablet 0 Past History Past Medical History: 
Past Medical History:  
Diagnosis Date  Anemia 1/11/2010  
 thrombocytopenia  Anxiety 1/11/2010  Atrial fibrillation ([address] 1/11/2010  Atrial flutter ([address] recurrent  CAD (coronary artery disease) 8/3/2010  Cataract 1/11/2010  Coronary atherosclerosis of native coronary artery  CVA (cerebral infarction)  Dementia ([address] 2/14/2020  Depression 3/18/2014  Diverticulitis 11/19/2014  DVT (deep venous thrombosis) ([address] 1/1/2007  Dysarthria  Essential hypertension, benign  GERD (gastroesophageal reflux disease) 1/11/2010  
 History of blood transfusion 2/16/2016  History of cardiac pacemaker 2/14/2020  HLD (hyperlipidemia) 1/11/2010  
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 
Past Surgical History:  
Procedure Laterality Date  CARDIAC SURG PROCEDURE UNLIST    
 ablation  HX CATARACT REMOVAL    
 bilateral  
 HX CHOLECYSTECTOMY  HX OTHER SURGICAL    
 IVC filter  HX PACEMAKER    
 IR BX BONE MARROW DIAGNOSTIC  2/20/2020  IR IVC FILTER PLACEMENT PERCUTANEOUS  2007 Family History: 
Family History Problem Relation Age of Onset [facility] Arthritis-osteo Mother  Hypertension Mother  Arthritis-osteo Father  Coronary Artery Disease Neg Hx Social History: 
Social History Tobacco Use  Smoking status: Never Smoker  Smokeless tobacco: Never Used Substance Use Topics  Alcohol use: No  
  Alcohol/week: 0.0 standard drinks  Drug use: No  
 
 
Allergies: Allergies Allergen Reactions  Pcn [Penicillins] Hives Review of Systems Review of Systems Unable to perform ROS: Dementia Physical Exam  
 
Visit Vitals /61 (BP 1 Location: Left arm, BP Patient Position: At rest) Pulse 80 Resp 18 Ht 5' 2\" (1.575 m) Wt 49.9 kg (110 lb) SpO2 99% BMI 20.12 kg/m² Physical Exam 
Constitutional:   
   General: She is not in acute distress. Appearance: She is not ill-appearing, toxic-appearing or diaphoretic. Comments: thin HENT:  
   Head: Normocephalic and atraumatic. Eyes:  
   Extraocular Movements: Extraocular movements intact. Right eye: No nystagmus. Left eye: No nystagmus. Cardiovascular:  
   Heart sounds: Normal heart sounds. Comments: Paced rate of 70-80 Pulmonary:  
   Effort: Pulmonary effort is normal.  
   Breath sounds: Normal breath sounds. No wheezing, rhonchi or rales. Chest:  
   Chest wall: No tenderness. Abdominal:  
   General: There is no distension. Palpations: Abdomen is soft. Tenderness: There is no abdominal tenderness. Musculoskeletal: Normal range of motion. General: No swelling. Skin: 
   General: Skin is warm and dry. Capillary Refill: Capillary refill takes less than 2 seconds. Coloration: Skin is not cyanotic. Findings: No erythema. Neurological:  
   Mental Status: She is alert. Mental status is at baseline. GCS: GCS eye subscore is 4. GCS verbal subscore is 4. GCS motor subscore is 6. Comments: Appears to be a mental baseline per daughter who is present on re-eval  
 
 
 
 
Diagnostic Study Results Labs - Recent Results (from the past 12 hour(s)) CBC WITH AUTOMATED DIFF Collection Time: 02/28/20  1:25 PM  
Result Value Ref Range WBC 7.8 4.6 - 13.2 K/uL  
 RBC 4.24 4.20 - 5.30 M/uL  
 HGB 13.0 12.0 - 16.0 g/dL HCT 42.1 35.0 - 45.0 % MCV 99.3 (H) 74.0 - 97.0 FL  
 MCH 30.7 24.0 - 34.0 PG  
 MCHC 30.9 (L) 31.0 - 37.0 g/dL  
 RDW 18.9 (H) 11.6 - 14.5 % PLATELET 70 (L) 182 - 420 K/uL MPV 12.5 (H) 9.2 - 11.8 FL  
 NEUTROPHILS 74 (H) 40 - 73 % LYMPHOCYTES 18 (L) 21 - 52 % MONOCYTES 7 3 - 10 % EOSINOPHILS 1 0 - 5 % BASOPHILS 0 0 - 2 %  
 ABS. NEUTROPHILS 5.8 1.8 - 8.0 K/UL  
 ABS. LYMPHOCYTES 1.4 0.9 - 3.6 K/UL  
 ABS. MONOCYTES 0.5 0.05 - 1.2 K/UL  
 ABS. EOSINOPHILS 0.1 0.0 - 0.4 K/UL  
 ABS. BASOPHILS 0.0 0.0 - 0.1 K/UL  
 DF AUTOMATED URINALYSIS W/ RFLX MICROSCOPIC Collection Time: 02/28/20  3:48 PM  
Result Value Ref Range Color YELLOW Appearance CLOUDY Specific gravity 1.014 1.005 - 1.030    
 pH (UA) 5.5 5.0 - 8.0 Protein 30 (A) NEG mg/dL Glucose NEGATIVE  NEG mg/dL Ketone 15 (A) NEG mg/dL Bilirubin NEGATIVE  NEG Blood NEGATIVE  NEG Urobilinogen 1.0 0.2 - 1.0 EU/dL Nitrites NEGATIVE  NEG Leukocyte Esterase NEGATIVE  NEG    
URINE MICROSCOPIC ONLY Collection Time: 02/28/20  3:48 PM  
Result Value Ref Range WBC 0 to 3 0 - 4 /hpf  
 RBC NEGATIVE  0 - 5 /hpf Epithelial cells NEGATIVE  0 - 5 /lpf Bacteria 4+ (A) NEG /hpf Amorphous Crystals 2+ (A) NEG Hyaline cast 11 to 20 0 - 2 /lpf Granular cast 0 to 4 NEG /lpf METABOLIC PANEL, COMPREHENSIVE Collection Time: 02/28/20  3:55 PM  
Result Value Ref Range Sodium 159 (H) 136 - 145 mmol/L Potassium 3.7 3.5 - 5.5 mmol/L Chloride 123 (H) 100 - 111 mmol/L  
 CO2 29 21 - 32 mmol/L Anion gap 7 3.0 - 18 mmol/L Glucose 99 74 - 99 mg/dL BUN 50 (H) 7.0 - 18 MG/DL Creatinine 1.92 (H) 0.6 - 1.3 MG/DL  
 BUN/Creatinine ratio 26 (H) 12 - 20 GFR est AA 30 (L) >60 ml/min/1.73m2 GFR est non-AA 24 (L) >60 ml/min/1.73m2 Calcium 8.7 8.5 - 10.1 MG/DL Bilirubin, total 1.3 (H) 0.2 - 1.0 MG/DL  
 ALT (SGPT) 19 13 - 56 U/L  
 AST (SGOT) 37 10 - 38 U/L Alk. phosphatase 59 45 - 117 U/L Protein, total 7.9 6.4 - 8.2 g/dL Albumin 3.4 3.4 - 5.0 g/dL Globulin 4.5 (H) 2.0 - 4.0 g/dL A-G Ratio 0.8 0.8 - 1.7 Radiologic Studies -  
 CT HEAD WO CONT Final Result IMPRESSION:   
  
Stable appearance of brain. Advanced small vessel disease such as is seen in  
patients with diabetes or hypertension. Old left perisylvian infarct. XR CHEST PORT Final Result IMPRESSION: No evidence of acute cardiopulmonary disease. No evidence of  
interval change. Medical Decision Making I am the first provider for this patient. I reviewed the vital signs, available nursing notes, past medical history, past surgical history, family history and social history. Vital Signs-Reviewed the patient's vital signs. EKG: n/a Records Reviewed: Nursing Notes, Old Medical Records, Ambulance Run Sheet, Previous Radiology Studies and Previous Laboratory Studies (Time of Review: 3:25 PM) ED Course: Progress Notes, Reevaluation, and Consults: 
 
ED Course as of Feb 28 1748 Fri Feb 28, 2020  
1350 [employer]tronic - long standing Afib, no RVR, otherwise no other concerns from interrogation  
 [GW] 1520 H/H at baseline - stable  
 [GW] 1546 Not elevated to suggest infection WBC: 7.8 [GW] 1605 Nitrites: NEGATIVE  [GW] 1605 Does not suggest infection Leukocyte Esterase: NEGATIVE  [GW] 1641 Creatinine(!): 1.92 [GW] 1641 Elevated here - continuing IVF started by EMS- likely stable for dc upon completion of gentle fluid repletion. 500cc was given prior - gave order to continue the 1L bolus that was at 500 on arrival - ordered an additional 500cc bolus here - total 1.5L given today  
BUN(!): 50 [GW]  
1651 Hypernatremic, hypovolemia -likely 3L free water deficit Sodium(!): 159 [GW] ED Course User Index 
[GW] Fuad Monge MD  
 
 
Provider Notes (Medical Decision Making): MDM Number of Diagnoses or Management Options JACOB (acute kidney injury) ([address]): Hypernatremia: Hypovolemia dehydration:  
Diagnosis management comments: Patient is DNR confirmed via paperwork Patient at baseline per daughter after 400cc bolus by EMS DDx includes sepsis, CVA, anemia, hypothermia UA clear, CXR clear, WBC not elevated to suggest sepsis CMP - shows hypovolemic hypernatremia - 1L additional IVF given here Likely not taking po at nursing facility d/t demetia - will likely return here if this process not addressed. Procedures Critical Care Time: none Diagnosis Clinical Impression: 1. Hypovolemia dehydration 2. Hypernatremia 3. JACOB (acute kidney injury) ([address] Disposition: [address] [place] [address] Follow-up Information Follow up With Specialties Details Why Contact Info [facility] EMERGENCY DEPT Emergency Medicine  If symptoms worsen [address] [address] 
[phone] Mercedita JANAE Fisher Nurse Practitioner Call  To discuss options for fluid replacement if patient does not want to drink. [address] [place] [address] [id number] [facility] [address] [phone] DISCHARGE NOTE:  
Pt has been reexamined. Patient has no new complaints, changes, or physical findings. Care plan outlined and precautions discussed. Results were reviewed with the patient. All medications were reviewed with the patient; will d/c home with pain medication. All of pt's questions and concerns were addressed. Alarm symptoms and return precautions associated with chief complaint and evaluation were reviewed with the patient in detail. The patient demonstrated adequate understanding. Patient was instructed and agrees to follow up with PCP, as well as to return to the ED upon further deterioration. Patient is ready to go home. The patient is happy with this plan Patient's Medications Start Taking No medications on file Continue Taking AMLODIPINE (NORVASC) 10 MG TABLET    Take 1 Tab by mouth daily. LOSARTAN (COZAAR) 100 MG TABLET    TAKE 1 TABLET BY MOUTH TWICE DAILY METOPROLOL TARTRATE (LOPRESSOR) 50 MG TABLET    TAKE ONE TABLET BY MOUTH ONCE DAILY POLYETHYLENE GLYCOL (MIRALAX) 17 GRAM/DOSE POWDER    Take 17 g by mouth daily. SENNA-DOCUSATE (PERICOLACE) 8.6-50 MG PER TABLET    Take 1 tablet by mouth daily. These Medications have changed No medications on file Stop Taking No medications on file Disclaimer: Sections of this note are dictated using utilizing voice recognition software. Minor typographical errors may be present. If questions arise, please do not hesitate to contact me or call our department.

## 2020-02-28 NOTE — DISCHARGE INSTRUCTIONS
Nursing Home orders: please repeat BMP tomorrow 2/29 AM;  Previous BMP here prior to fluids:    Federico Bee (MRN [id number]) as of 2/28/2020 17:34   Ref.  Range 2/28/2020 15:55   Sodium Latest Ref Range: 136 - 145 mmol/L 159 (H)   Potassium Latest Ref Range: 3.5 - 5.5 mmol/L 3.7   Chloride Latest Ref Range: 100 - 111 mmol/L 123 (H)   CO2 Latest Ref Range: 21 - 32 mmol/L 29   Anion gap Latest Ref Range: 3.0 - 18 mmol/L 7   Glucose Latest Ref Range: 74 - 99 mg/dL 99   BUN Latest Ref Range: 7.0 - 18 MG/DL 50 (H)   Creatinine Latest Ref Range: 0.6 - 1.3 MG/DL 1.92 (H)   BUN/Creatinine ratio Latest Ref Range: 12 - 20   26 (H)   Calcium Latest Ref Range: 8.5 - 10.1 MG/DL 8.7

## 2025-06-09 NOTE — PROGRESS NOTES
Problem: Falls - Risk of 
Goal: *Absence of Falls Description Document Starleen Freeze Fall Risk and appropriate interventions in the flowsheet. Outcome: Progressing Towards Goal 
Note: Fall Risk Interventions: 
  
 
Mentation Interventions: Adequate sleep, hydration, pain control, Bed/chair exit alarm, Door open when patient unattended, Evaluate medications/consider consulting pharmacy, Eyeglasses and hearing aids, Familiar objects from home, More frequent rounding, Reorient patient, Room close to nurse's station, Toileting rounds, Update white board Medication Interventions: Assess postural VS orthostatic hypotension, Bed/chair exit alarm, Evaluate medications/consider consulting pharmacy Elimination Interventions: Bed/chair exit alarm, Call light in reach, Toileting schedule/hourly rounds History of Falls Interventions: Bed/chair exit alarm, Consult care management for discharge planning, Door open when patient unattended, Evaluate medications/consider consulting pharmacy, Investigate reason for fall, Room close to nurse's station, Vital signs minimum Q4HRs X 24 hrs (comment for end date) Problem: Patient Education: Go to Patient Education Activity Goal: Patient/Family Education Outcome: Progressing Towards Goal 
  
Problem: Pressure Injury - Risk of 
Goal: *Prevention of pressure injury Description Document Sam Scale and appropriate interventions in the flowsheet. Outcome: Progressing Towards Goal 
Note: Pressure Injury Interventions: 
Sensory Interventions: Assess changes in LOC Moisture Interventions: Absorbent underpads Activity Interventions: Assess need for specialty bed Mobility Interventions: Assess need for specialty bed Nutrition Interventions: Document food/fluid/supplement intake Friction and Shear Interventions: Apply protective barrier, creams and emollients Problem: Patient Education: Go to Patient Education Activity Referral placed. Patient informed.    Goal: Patient/Family Education Outcome: Progressing Towards Goal 
  
Problem: Patient Education: Go to Patient Education Activity Goal: Patient/Family Education Outcome: Progressing Towards Goal 
  
Problem: Patient Education: Go to Patient Education Activity Goal: Patient/Family Education Outcome: Progressing Towards Goal 
  
Problem: Pain Goal: *Control of Pain Outcome: Progressing Towards Goal 
  
Problem: Patient Education: Go to Patient Education Activity Goal: Patient/Family Education Outcome: Progressing Towards Goal 
  
Problem: Patient Education: Go to Patient Education Activity Goal: Patient/Family Education Outcome: Progressing Towards Goal 
  
Problem: Patient Education: Go to Patient Education Activity Goal: Patient/Family Education Outcome: Progressing Towards Goal 
  
Problem: Hip Fracture: Post-Op Day 3 Goal: Off Pathway (Use only if patient is Off Pathway) Outcome: Progressing Towards Goal 
Goal: Activity/Safety Outcome: Progressing Towards Goal 
Goal: Diagnostic Test/Procedures Outcome: Progressing Towards Goal 
Goal: Nutrition/Diet Outcome: Progressing Towards Goal 
Goal: Medications Outcome: Progressing Towards Goal 
Goal: Respiratory Outcome: Progressing Towards Goal 
Goal: Treatments/Interventions/Procedures Outcome: Progressing Towards Goal 
Goal: Psychosocial 
Outcome: Progressing Towards Goal 
Goal: Discharge Planning Outcome: Progressing Towards Goal 
Goal: *Met physical therapy criteria for discharge to next level of care Outcome: Progressing Towards Goal 
Goal: *Optimal pain control at patient's stated goal 
Outcome: Progressing Towards Goal 
Goal: *Hemodynamically stable Outcome: Progressing Towards Goal 
Goal: *Tolerating diet Outcome: Progressing Towards Goal 
Goal: *Active bowel function Outcome: Progressing Towards Goal 
Goal: *Adequate urinary output Outcome: Progressing Towards Goal 
Goal: *Absence of skin breakdown Outcome: Progressing Towards Goal 
 Goal: *Patient verbalizes understanding of discharge instructions Outcome: Progressing Towards Goal

## (undated) DEVICE — Device

## (undated) DEVICE — GOWN,SIRUS,FABRNF,2XL,18/CS: Brand: MEDLINE

## (undated) DEVICE — FLEX ADVANTAGE 3000CC: Brand: FLEX ADVANTAGE

## (undated) DEVICE — STERILE POLYISOPRENE POWDER-FREE SURGICAL GLOVES: Brand: PROTEXIS

## (undated) DEVICE — INTENDED FOR TISSUE SEPARATION, AND OTHER PROCEDURES THAT REQUIRE A SHARP SURGICAL BLADE TO PUNCTURE OR CUT.: Brand: BARD-PARKER SAFETY BLADES SIZE 10, STERILE

## (undated) DEVICE — 6617 IOBAN II PATIENT ISOLATION DRAPE 5/BX,4BX/CS: Brand: STERI-DRAPE™ IOBAN™ 2

## (undated) DEVICE — SOLUTION IV 1000ML 0.9% SOD CHL

## (undated) DEVICE — SUTURE VCRL SZ 2-0 L36IN ABSRB VLT CTX L48MM 1/2 CIR SGL J369H

## (undated) DEVICE — KIT CLN UP BON SECOURS MARYV

## (undated) DEVICE — GAUZE,SPONGE,4"X4",16PLY,STRL,LF,10/TRAY: Brand: MEDLINE

## (undated) DEVICE — SUTURE VCRL SZ 2 L27IN ABSRB VLT L65MM TP-1 1/2 CIR J649G

## (undated) DEVICE — REM POLYHESIVE ADULT PATIENT RETURN ELECTRODE: Brand: VALLEYLAB

## (undated) DEVICE — PREP SKN CHLRAPRP 26ML TNT -- CONVERT TO ITEM 373320

## (undated) DEVICE — SPONGE LAP 18X18IN STRL -- 5/PK

## (undated) DEVICE — PACK PROCEDURE SURG MAJ W/ BASIN LF

## (undated) DEVICE — DRESSING,GAUZE,XEROFORM,CURAD,1"X8",ST: Brand: CURAD

## (undated) DEVICE — DRESSING TRNSPAR FLM 4X5IN OPSITE

## (undated) DEVICE — PAD,ABDOMINAL,8"X10",ST,LF: Brand: MEDLINE